# Patient Record
Sex: MALE | Race: WHITE | NOT HISPANIC OR LATINO | Employment: FULL TIME | URBAN - METROPOLITAN AREA
[De-identification: names, ages, dates, MRNs, and addresses within clinical notes are randomized per-mention and may not be internally consistent; named-entity substitution may affect disease eponyms.]

---

## 2017-01-25 RX ORDER — CANAGLIFLOZIN 300 MG/1
TABLET, FILM COATED ORAL
Qty: 30 TABLET | Refills: 0 | Status: SHIPPED | OUTPATIENT
Start: 2017-01-25 | End: 2017-02-21 | Stop reason: SDUPTHER

## 2017-02-21 RX ORDER — CANAGLIFLOZIN 300 MG/1
TABLET, FILM COATED ORAL
Qty: 30 TABLET | Refills: 0 | Status: SHIPPED | OUTPATIENT
Start: 2017-02-21 | End: 2017-03-28 | Stop reason: SDUPTHER

## 2017-02-28 ENCOUNTER — LAB (OUTPATIENT)
Dept: INTERNAL MEDICINE | Facility: CLINIC | Age: 57
End: 2017-02-28

## 2017-02-28 DIAGNOSIS — E78.5 DYSLIPIDEMIA: ICD-10-CM

## 2017-02-28 DIAGNOSIS — E55.9 VITAMIN D DEFICIENCY: Primary | ICD-10-CM

## 2017-02-28 DIAGNOSIS — IMO0002 UNCONTROLLED TYPE 2 DIABETES MELLITUS WITH DIABETIC NEPHROPATHY, WITHOUT LONG-TERM CURRENT USE OF INSULIN: ICD-10-CM

## 2017-02-28 DIAGNOSIS — E83.52 HYPERCALCEMIA: ICD-10-CM

## 2017-02-28 LAB
ANION GAP SERPL CALCULATED.3IONS-SCNC: 14 MMOL/L (ref 3–11)
ARTICHOKE IGE QN: 66 MG/DL (ref 0–130)
BUN BLD-MCNC: 19 MG/DL (ref 9–23)
BUN/CREAT SERPL: 15.8 (ref 7–25)
CA-I SERPL ISE-MCNC: 1.38 MMOL/L (ref 1.12–1.32)
CALCIUM SPEC-SCNC: 10.5 MG/DL (ref 8.7–10.4)
CHLORIDE SERPL-SCNC: 103 MMOL/L (ref 99–109)
CHOLEST SERPL-MCNC: 223 MG/DL (ref 0–200)
CO2 SERPL-SCNC: 21 MMOL/L (ref 20–31)
CREAT BLD-MCNC: 1.2 MG/DL (ref 0.6–1.3)
GFR SERPL CREATININE-BSD FRML MDRD: 63 ML/MIN/1.73
GLUCOSE BLD-MCNC: 124 MG/DL (ref 70–100)
HBA1C MFR BLD: 6.2 % (ref 4.8–5.6)
HDLC SERPL-MCNC: 29 MG/DL (ref 40–60)
POTASSIUM BLD-SCNC: 4.2 MMOL/L (ref 3.5–5.5)
SODIUM BLD-SCNC: 138 MMOL/L (ref 132–146)
TRIGL SERPL-MCNC: >1100 MG/DL (ref 0–150)

## 2017-02-28 PROCEDURE — 83970 ASSAY OF PARATHORMONE: CPT | Performed by: INTERNAL MEDICINE

## 2017-02-28 PROCEDURE — 36415 COLL VENOUS BLD VENIPUNCTURE: CPT | Performed by: INTERNAL MEDICINE

## 2017-02-28 PROCEDURE — 80048 BASIC METABOLIC PNL TOTAL CA: CPT | Performed by: INTERNAL MEDICINE

## 2017-02-28 PROCEDURE — 83036 HEMOGLOBIN GLYCOSYLATED A1C: CPT | Performed by: INTERNAL MEDICINE

## 2017-02-28 PROCEDURE — 82330 ASSAY OF CALCIUM: CPT | Performed by: INTERNAL MEDICINE

## 2017-02-28 PROCEDURE — 80061 LIPID PANEL: CPT | Performed by: INTERNAL MEDICINE

## 2017-02-28 PROCEDURE — 82306 VITAMIN D 25 HYDROXY: CPT | Performed by: INTERNAL MEDICINE

## 2017-03-01 LAB
25(OH)D3 SERPL-MCNC: 36.2 NG/ML
CALCIUM SERPL-MCNC: 10.4 MG/DL (ref 8.7–10.2)
INTACT PTH: ABNORMAL
PTH-INTACT SERPL-MCNC: 17 PG/ML (ref 15–65)

## 2017-03-03 ENCOUNTER — OFFICE VISIT (OUTPATIENT)
Dept: INTERNAL MEDICINE | Facility: CLINIC | Age: 57
End: 2017-03-03

## 2017-03-03 VITALS
DIASTOLIC BLOOD PRESSURE: 76 MMHG | WEIGHT: 199 LBS | BODY MASS INDEX: 29.47 KG/M2 | SYSTOLIC BLOOD PRESSURE: 120 MMHG | HEIGHT: 69 IN

## 2017-03-03 DIAGNOSIS — I10 ESSENTIAL HYPERTENSION: ICD-10-CM

## 2017-03-03 DIAGNOSIS — E83.52 HYPERCALCEMIA: ICD-10-CM

## 2017-03-03 DIAGNOSIS — E11.9 CONTROLLED TYPE 2 DIABETES MELLITUS WITHOUT COMPLICATION, WITHOUT LONG-TERM CURRENT USE OF INSULIN (HCC): ICD-10-CM

## 2017-03-03 DIAGNOSIS — Z00.00 PE (PHYSICAL EXAM), ROUTINE: Primary | ICD-10-CM

## 2017-03-03 DIAGNOSIS — E55.9 VITAMIN D DEFICIENCY: ICD-10-CM

## 2017-03-03 DIAGNOSIS — E78.5 DYSLIPIDEMIA: ICD-10-CM

## 2017-03-03 PROCEDURE — 93000 ELECTROCARDIOGRAM COMPLETE: CPT | Performed by: INTERNAL MEDICINE

## 2017-03-03 PROCEDURE — 99213 OFFICE O/P EST LOW 20 MIN: CPT | Performed by: INTERNAL MEDICINE

## 2017-03-03 PROCEDURE — 99396 PREV VISIT EST AGE 40-64: CPT | Performed by: INTERNAL MEDICINE

## 2017-03-03 NOTE — PROGRESS NOTES
Chief Complaint   Patient presents with   • Annual Exam       History of Present Illness  56 y.o.  ke presents for updated phys examination.  Not recently checking BGs since ran out of strips.  BG previously were fasting 120-180s.    Review of Systems  Denies headaches, visual changes, CP, palpitations, SOB, cough, abd pain, n/v/d, difficulty with urination, numbness/tingling, falls, mood changes, lightheadedness, hearing changes, rashes.    Reports compliance with medications.    ROS (+) for isolated swollen testicle for 1 day with spont resolved.  Sits a lot for his work.  Had to associated sxs, no urinary sxs, groin pain, rash.     All other ROS reviewed and negative.    [facility]  The following portions of the patient's history were reviewed and updated as appropriate: allergies, current medications, past family history, past medical history, past social history, past surgical history and problem list.      Current Outpatient Prescriptions:   •  aspirin 81 MG tablet, Take 1 tablet by mouth daily., Disp: , Rfl:   •  Cholecalciferol (VITAMIN D3) 5000 UNITS capsule capsule, Take 5,000 Units by mouth Daily., Disp: , Rfl:   •  EPINEPHrine (EPIPEN) 0.3 MG/0.3ML solution auto-injector injection, USE AS DIRECTED, Disp: 1 each, Rfl: 1  •  fenofibric acid (TRILIPIX) 135 MG capsule delayed-release delayed release capsule, Take 1 capsule by mouth Daily., Disp: 90 capsule, Rfl: 1  •  icosapent ethyl (VASCEPA) 1 G capsule capsule, Take 2 g by mouth 2 (Two) Times a Day With Meals., Disp: 360 capsule, Rfl: 3  •  INVOKANA 300 MG tablet, TAKE ONE TABLET BY MOUTH ONCE DAILY, Disp: 30 tablet, Rfl: 0  •  meclizine (ANTIVERT) 25 MG tablet, Take 1 tablet by mouth 3 (three) times a day as needed., Disp: , Rfl:   •  metFORMIN XR (GLUCOPHAGE-XR) 500 MG 24 hr tablet, Take 4 tablets by mouth Daily With Breakfast., Disp: 360 tablet, Rfl: 3  •  promethazine (PHENERGAN) 25 MG tablet, Take 1 tablet by mouth every 8 (eight) hours as  "needed. For nausea and vomiting, Disp: , Rfl:   •  triamterene-hydrochlorothiazide (DYAZIDE) 37.5-25 MG per capsule, Take 1 capsule by mouth Every Morning., Disp: 90 capsule, Rfl: 2    Visit Vitals   • /76   • Ht 69\" (175.3 cm)   • Wt 199 lb (90.3 kg)   • BMI 29.39 kg/m2       Physical Exam   Constitutional: He is oriented to person, place, and time. He appears well-developed and well-nourished.   HENT:   Head: Normocephalic.   Right Ear: Tympanic membrane normal.   Left Ear: Tympanic membrane normal.   Nose: Nose normal.   Mouth/Throat: Oropharynx is clear and moist and mucous membranes are normal. No oropharyngeal exudate.   Eyes: Conjunctivae and EOM are normal. Pupils are equal, round, and reactive to light.   Neck: Normal range of motion. Neck supple. Carotid bruit is not present. No thyromegaly present.   Cardiovascular: Normal rate, regular rhythm and normal heart sounds.    Pulmonary/Chest: Effort normal and breath sounds normal. No respiratory distress.   Abdominal: Soft. Bowel sounds are normal. He exhibits no distension and no mass. There is no hepatosplenomegaly. There is no tenderness.   Genitourinary:   Genitourinary Comments: Chaperone declined by patient; normal external genitalia, no hemorrhoids, prostate smooth, minimally enlarged, nontender, no nodules palpated       Musculoskeletal: Normal range of motion. He exhibits no edema.    Bradley had a diabetic foot exam performed today.    Vascular Status -  His exam exhibits no right foot edema. His exam exhibits no left foot edema.   Skin Integrity  -  His right foot skin is intact.     Bradley 's left foot skin is intact. .  Lymphadenopathy:     He has no cervical adenopathy.   Neurological: He is alert and oriented to person, place, and time. He has normal strength and normal reflexes. He displays normal reflexes. No cranial nerve deficit or sensory deficit.   Skin: Skin is warm and dry. No rash noted.   Psychiatric: He has a normal mood and " affect. His behavior is normal.   Nursing note and vitals reviewed.      LABS  Results for orders placed or performed in visit on 02/28/17   Lipid Panel   Result Value Ref Range    Total Cholesterol 223 (H) 0 - 200 mg/dL    Triglycerides >1100 (H) 0 - 150 mg/dL    HDL Cholesterol 29 (L) 40 - 60 mg/dL    LDL Cholesterol  66 0 - 130 mg/dL   Hemoglobin A1c   Result Value Ref Range    Hemoglobin A1C 6.20 (H) 4.80 - 5.60 %   Basic Metabolic Panel   Result Value Ref Range    Glucose 124 (H) 70 - 100 mg/dL    BUN 19 9 - 23 mg/dL    Creatinine 1.20 0.60 - 1.30 mg/dL    Sodium 138 132 - 146 mmol/L    Potassium 4.2 3.5 - 5.5 mmol/L    Chloride 103 99 - 109 mmol/L    CO2 21.0 20.0 - 31.0 mmol/L    Calcium 10.5 (H) 8.7 - 10.4 mg/dL    eGFR Non African Amer 63 >60 mL/min/1.73    BUN/Creatinine Ratio 15.8 7.0 - 25.0    Anion Gap 14.0 (H) 3.0 - 11.0 mmol/L   Calcium, Ionized   Result Value Ref Range    Ionized Calcium 1.38 (H) 1.12 - 1.32 mmol/L   PTH, Intact & Calcium   Result Value Ref Range    Calcium 10.4 (H) 8.7 - 10.2 mg/dL    PTH, Intact 17 15 - 65 pg/mL    PTH, Intact Comment    Vitamin D 25 Hydroxy   Result Value Ref Range    25 Hydroxy, Vitamin D 36.2 ng/ml     ECG 12 Lead  Date/Time: 3/3/2017 11:01 AM  Performed by: SPENCER TIDWELL  Authorized by: SPENCER TIDWELL   Comparison: compared with previous ECG from 3/1/2016  Similar to previous ECG  Rhythm: sinus rhythm  Rate: normal  BPM: 72  Conduction: conduction normal  ST Segments: ST segments normal  QRS axis: normal  Other findings: PRWP  Other findings comments: inverted T in lead III, unchanged  Clinical impression comment: stable EKG              ASSESSMENT/PLAN  Problem List Items Addressed This Visit     Dyslipidemia     TGs remain elevated with vascepa and fenofibrate; LDL at goal (66); rec trying very strict no fat/sugars diet as well as exercise to help with weight loss; f/u lipids in 3 mos         Hypertension     BP stable but with hypercalcemia, will discontinue  triam HCTZ; follow BPs and consider ACEI; f/u in 3 mos at the latest         Relevant Orders    ECG 12 Lead    Controlled type 2 diabetes mellitus      BG control significantly improved with A1C 6.2; remains on inokana 300mg QD and met XR 4 QD         Vitamin D deficiency     Stable, no current supplementation         PE (physical exam), routine - Primary     Health maintenance - flu vacc 10/16; PVX 9/16, plan for Prevnar at age 65; Tdap 7/11, Zostavax 11/13; CASSANDRA performed today with stable PSA 1.1 (12/16); nl colonosc 6/15, repeat 2020 per Dr. Brandon; eye exam 10/16 at [facility], done annually; dental exam UTD, done every 6 mos         Hypercalcemia     Bord Ca levels 10.4-10.7 over the last year; nl PTH; bord ionized Ca; discussed with Dr Linder for recs; d/c HCTZ and f/u level (BMP, ionized Ca, PTH-related peptide, SPEP, UPEP) in 1 month               FOLLOW-UP  1/ F/U labs for hypercalcemia evaluation in 1 month (BMP, ionized Ca, PTH, SPEP, UPEP, PTH-related peptide)  2. RTC 3 mos with A1C, lipids     Electronically signed by:    Violette Hylton MD  03/03/2017

## 2017-03-28 RX ORDER — CANAGLIFLOZIN 300 MG/1
TABLET, FILM COATED ORAL
Qty: 30 TABLET | Refills: 3 | Status: SHIPPED | OUTPATIENT
Start: 2017-03-28 | End: 2017-09-02 | Stop reason: SDUPTHER

## 2017-04-24 RX ORDER — FENOFIBRIC ACID 135 MG/1
CAPSULE, DELAYED RELEASE ORAL
Qty: 90 CAPSULE | Refills: 1 | Status: SHIPPED | OUTPATIENT
Start: 2017-04-24 | End: 2017-10-09 | Stop reason: SDUPTHER

## 2017-04-25 ENCOUNTER — TELEPHONE (OUTPATIENT)
Dept: INTERNAL MEDICINE | Facility: CLINIC | Age: 57
End: 2017-04-25

## 2017-04-25 NOTE — TELEPHONE ENCOUNTER
Patient notified.     He does not need a referral. He has scheduled an appt with Dr Bynum on May 2nd.

## 2017-04-25 NOTE — TELEPHONE ENCOUNTER
----- Message from Violette Hylton MD sent at 4/24/2017 10:32 AM EDT -----  Contact: PATIENT   rec urology consultation - does he want us to send referral    Also reminder that he needs to have labs done to follow-up on borderline calcium levels (nonfasting ok)  ----- Message -----     From: Yohana Lucas MA     Sent: 4/24/2017  10:20 AM       To: Violette Hylton MD        ----- Message -----     From: Kiana Knutson     Sent: 4/24/2017  10:15 AM       To: Yohana Lucas MA    RE: PAIN QUESTIONS    MR BRUNO WOULD LIKE TO KNOW IF HE NEEDS TO GO TO SPECIALIST OR COME IN TO SEE DR HYLTON FOR TESTICLE PAIN. HE SPOKE WITH WITH DR HYLTON ABOUT THIS DURING HIS LAST VISIT BUT THE ISSUE HAS NOT RESOLEVED ITSELF AND HE WOULD LIKE FURTHER EVALUATION.    CALL BACK #[phone]

## 2017-05-02 ENCOUNTER — LAB (OUTPATIENT)
Dept: INTERNAL MEDICINE | Facility: CLINIC | Age: 57
End: 2017-05-02

## 2017-05-02 DIAGNOSIS — E83.52 HYPERCALCEMIA: ICD-10-CM

## 2017-05-02 LAB — CA-I SERPL ISE-MCNC: 1.37 MMOL/L (ref 1.12–1.32)

## 2017-05-02 PROCEDURE — 82397 CHEMILUMINESCENT ASSAY: CPT | Performed by: INTERNAL MEDICINE

## 2017-05-02 PROCEDURE — 84155 ASSAY OF PROTEIN SERUM: CPT | Performed by: INTERNAL MEDICINE

## 2017-05-02 PROCEDURE — 84165 PROTEIN E-PHORESIS SERUM: CPT | Performed by: INTERNAL MEDICINE

## 2017-05-02 PROCEDURE — 82310 ASSAY OF CALCIUM: CPT | Performed by: INTERNAL MEDICINE

## 2017-05-02 PROCEDURE — 83970 ASSAY OF PARATHORMONE: CPT | Performed by: INTERNAL MEDICINE

## 2017-05-02 PROCEDURE — 82330 ASSAY OF CALCIUM: CPT | Performed by: INTERNAL MEDICINE

## 2017-05-03 LAB
ALBUMIN SERPL-MCNC: 3.7 G/DL (ref 2.9–4.4)
ALBUMIN/GLOB SERPL: 1.2 {RATIO} (ref 0.7–1.7)
ALPHA1 GLOB FLD ELPH-MCNC: 0.2 G/DL (ref 0–0.4)
ALPHA2 GLOB SERPL ELPH-MCNC: 0.8 G/DL (ref 0.4–1)
B-GLOBULIN SERPL ELPH-MCNC: 1 G/DL (ref 0.7–1.3)
CALCIUM SERPL-MCNC: 9.8 MG/DL (ref 8.7–10.2)
GAMMA GLOB SERPL ELPH-MCNC: 1.2 G/DL (ref 0.4–1.8)
GLOBULIN SER CALC-MCNC: 3.2 G/DL (ref 2.2–3.9)
INTACT PTH: NORMAL
Lab: NORMAL
M-SPIKE: NORMAL G/DL
PROT PATTERN SERPL ELPH-IMP: NORMAL
PROT SERPL-MCNC: 6.9 G/DL (ref 6–8.5)
PTH-INTACT SERPL-MCNC: 35 PG/ML (ref 15–65)

## 2017-05-06 LAB — PTH RELATED PROT SERPL-SCNC: <1.1 PMOL/L

## 2017-06-02 ENCOUNTER — TELEPHONE (OUTPATIENT)
Dept: INTERNAL MEDICINE | Facility: CLINIC | Age: 57
End: 2017-06-02

## 2017-06-02 ENCOUNTER — LAB (OUTPATIENT)
Dept: INTERNAL MEDICINE | Facility: CLINIC | Age: 57
End: 2017-06-02

## 2017-06-02 DIAGNOSIS — E11.9 CONTROLLED TYPE 2 DIABETES MELLITUS WITHOUT COMPLICATION, WITHOUT LONG-TERM CURRENT USE OF INSULIN (HCC): Primary | ICD-10-CM

## 2017-06-02 PROCEDURE — 83036 HEMOGLOBIN GLYCOSYLATED A1C: CPT | Performed by: INTERNAL MEDICINE

## 2017-06-02 NOTE — TELEPHONE ENCOUNTER
"----- Message from Violette Hylton MD sent at 6/2/2017  3:22 PM EDT -----  Contact: Patient   No - don't need to do urine test.    Please clarify however:  At last visit, was instructed to stop triam HCTZ and then to recheck calcium.  Is he still taking or not taking this?  If no longer on it, please take it off med list andd add to allergy list with \"side effect\" of hypercalcemia.  Would repeat BMP in 1 month to ensure cont'd resolution (nonfasting ok)  ----- Message -----     From: Yohana Lucas MA     Sent: 6/2/2017   9:22 AM       To: Violette Hylton MD    Does he need to do 24 hour urine test?     "

## 2017-06-08 ENCOUNTER — OFFICE VISIT (OUTPATIENT)
Dept: INTERNAL MEDICINE | Facility: CLINIC | Age: 57
End: 2017-06-08

## 2017-06-08 VITALS
BODY MASS INDEX: 29.77 KG/M2 | HEIGHT: 69 IN | DIASTOLIC BLOOD PRESSURE: 78 MMHG | WEIGHT: 201 LBS | SYSTOLIC BLOOD PRESSURE: 124 MMHG

## 2017-06-08 DIAGNOSIS — I10 ESSENTIAL HYPERTENSION: ICD-10-CM

## 2017-06-08 DIAGNOSIS — E83.52 HYPERCALCEMIA: ICD-10-CM

## 2017-06-08 DIAGNOSIS — E78.5 DYSLIPIDEMIA: ICD-10-CM

## 2017-06-08 DIAGNOSIS — E11.9 CONTROLLED TYPE 2 DIABETES MELLITUS WITHOUT COMPLICATION, WITHOUT LONG-TERM CURRENT USE OF INSULIN (HCC): Primary | ICD-10-CM

## 2017-06-08 LAB — HBA1C MFR BLD: 6.4 %

## 2017-06-08 PROCEDURE — 99214 OFFICE O/P EST MOD 30 MIN: CPT | Performed by: INTERNAL MEDICINE

## 2017-06-08 NOTE — PROGRESS NOTES
"Chief Complaint   Patient presents with   • Follow-up     Diabetes        History of Present Illness  56 y.o.  gentleman presents for DM follow-up.  Cholesterol profile was not done.  Not checking BGs.  Notes diet is average.  Reports compliance with fenofibrate and vascepa.    Review of Systems  ROS (+) for (+)FH TGs in father, who  of CA.  Denies CP, palpitations, SOB.  Notes ears have been asx despite discontinuation of HCTZ.  All other ROS reviewed and negative.    [facility]  The following portions of the patient's history were reviewed and updated as appropriate: allergies, current medications, past family history, past medical history, past social history, past surgical history and problem list.      Current Outpatient Prescriptions:   •  aspirin 81 MG tablet, Take 1 tablet by mouth daily., Disp: , Rfl:   •  Cholecalciferol (VITAMIN D3) 5000 UNITS capsule capsule, Take 5,000 Units by mouth Daily., Disp: , Rfl:   •  EPINEPHrine (EPIPEN) 0.3 MG/0.3ML solution auto-injector injection, USE AS DIRECTED, Disp: 1 each, Rfl: 1  •  fenofibric acid (TRILIPIX) 135 MG capsule delayed-release delayed release capsule, TAKE ONE CAPSULE BY MOUTH ONCE DAILY, Disp: 90 capsule, Rfl: 1  •  icosapent ethyl (VASCEPA) 1 G capsule capsule, Take 2 g by mouth 2 (Two) Times a Day With Meals., Disp: 360 capsule, Rfl: 3  •  INVOKANA 300 MG tablet, TAKE ONE TABLET BY MOUTH ONCE DAILY, Disp: 30 tablet, Rfl: 3  •  meclizine (ANTIVERT) 25 MG tablet, Take 1 tablet by mouth 3 (three) times a day as needed., Disp: , Rfl:   •  metFORMIN XR (GLUCOPHAGE-XR) 500 MG 24 hr tablet, Take 4 tablets by mouth Daily With Breakfast., Disp: 360 tablet, Rfl: 3  •  promethazine (PHENERGAN) 25 MG tablet, Take 1 tablet by mouth every 8 (eight) hours as needed. For nausea and vomiting, Disp: , Rfl:     VITALS:  /78  Ht 69\" (175.3 cm)  Wt 201 lb (91.2 kg)  BMI 29.68 kg/m2    Physical Exam   Constitutional: He is oriented to person, place, and " time. He appears well-developed and well-nourished.   Eyes: Conjunctivae and EOM are normal.   Cardiovascular: Normal rate, regular rhythm and normal heart sounds.    Pulmonary/Chest: Effort normal and breath sounds normal.   Abdominal: Soft. Bowel sounds are normal.   Neurological: He is alert and oriented to person, place, and time.   Psychiatric: He has a normal mood and affect. His behavior is normal.   Nursing note and vitals reviewed.      LABS  Results for orders placed or performed in visit on 06/02/17   POC Glycosylated Hemoglobin (Hb A1C)   Result Value Ref Range    Hemoglobin A1C 6.4 %   2/17 A1C 6.2    Results for orders placed or performed in visit on 05/02/17   PTH-related Peptide   Result Value Ref Range    PTH-Related Protein <1.1 pmol/L   Calcium, Ionized   Result Value Ref Range    Ionized Calcium 1.37 (H) 1.12 - 1.32 mmol/L   PTH, Intact & Calcium   Result Value Ref Range    Calcium 9.8 8.7 - 10.2 mg/dL    PTH, Intact 35 15 - 65 pg/mL    PTH, Intact Comment    Protein Elec + Interp, Serum   Result Value Ref Range    Total Protein 6.9 6.0 - 8.5 g/dL    Albumin 3.7 2.9 - 4.4 g/dL    Alpha-1-Globulin 0.2 0.0 - 0.4 g/dL    Alpha-2-Globulin 0.8 0.4 - 1.0 g/dL    Beta Globulin 1.0 0.7 - 1.3 g/dL    Gamma Globulin 1.2 0.4 - 1.8 g/dL    M-Rashad Not Observed Not Observed g/dL    Globulin 3.2 2.2 - 3.9 g/dL    A/G Ratio 1.2 0.7 - 1.7    Please note Comment     SPE Interpretation Comment        ASSESSMENT/PLAN  Problem List Items Addressed This Visit     Dyslipidemia     On fenofibrate and vascepa; (+)FH TGs in father; encouraged further decreasing saturated fats in the diet; he will return tomorrow for fasting lipids         Hypertension     BP remains stable; no current meds (previously on HCTZ for Meniere's)         Controlled type 2 diabetes mellitus  - Primary     BG control stable with A1C 6.4; on met XR 2000mg QD and invokana 300mg QD; question whether invokana is exacerbating TGs; repeat A1C in 3  mos         Hypercalcemia     Seemingly resolved after discontinuation of HCTZ; repeat again with next labs in 3 mos to ensure stability               FOLLOW-UP  1. Return tomorrow for updated lipids (escribed)  2. RTC 3 mos with lipids, BMP, A1C (escribed)    Electronically signed by:    Violette Hylton MD  06/08/2017

## 2017-06-08 NOTE — ASSESSMENT & PLAN NOTE
Seemingly resolved after discontinuation of HCTZ; repeat again with next labs in 3 mos to ensure stability

## 2017-06-08 NOTE — ASSESSMENT & PLAN NOTE
On fenofibrate and vascepa; (+)FH TGs in father; encouraged further decreasing saturated fats in the diet; he will return tomorrow for fasting lipids

## 2017-06-08 NOTE — ASSESSMENT & PLAN NOTE
BG control stable with A1C 6.4; on met XR 2000mg QD and invokana 300mg QD; question whether invokana is exacerbating TGs; repeat A1C in 3 mos

## 2017-07-03 ENCOUNTER — LAB (OUTPATIENT)
Dept: INTERNAL MEDICINE | Facility: CLINIC | Age: 57
End: 2017-07-03

## 2017-07-03 DIAGNOSIS — E78.5 DYSLIPIDEMIA: ICD-10-CM

## 2017-07-03 LAB
ARTICHOKE IGE QN: 115 MG/DL (ref 0–130)
CHOLEST SERPL-MCNC: 187 MG/DL (ref 0–200)
HDLC SERPL-MCNC: 33 MG/DL (ref 40–60)
TRIGL SERPL-MCNC: 326 MG/DL (ref 0–150)

## 2017-07-03 PROCEDURE — 80061 LIPID PANEL: CPT | Performed by: INTERNAL MEDICINE

## 2017-07-03 NOTE — PROGRESS NOTES
Dear  Angel,    Thank you for obtaining the laboratories that we ordered.  I have received those results and would like to review them with you.    I am so excited to share your cholesterol profile results with you, showing a total cholesterol of 187 (goal < 200).  Your triglycerides are much improved at 326 (previously >1100).  Your HDL, the good cholesterol, is unchanged/borderline low at 33.  HDL is heart protective, and the goal is > 40 in men.  The best way to increased HDL is regular aerobic exercise.  Your LDL, the bad cholesterol, is slightly increased at 115 (previously 66).  Goal for LDL is < 100.    Let me know if you have any questions or concerns regarding these results; otherwise I will see you back for follow-up as scheduled on 9/15/17 (and please remember to do fasting labs the week prior to that appointment.    Sincerely,  Violette Hylton MD

## 2017-09-02 RX ORDER — CANAGLIFLOZIN 300 MG/1
TABLET, FILM COATED ORAL
Qty: 30 TABLET | Refills: 3 | Status: SHIPPED | OUTPATIENT
Start: 2017-09-02 | End: 2018-01-08 | Stop reason: SDUPTHER

## 2017-09-08 ENCOUNTER — LAB (OUTPATIENT)
Dept: INTERNAL MEDICINE | Facility: CLINIC | Age: 57
End: 2017-09-08

## 2017-09-08 DIAGNOSIS — E83.52 HYPERCALCEMIA: ICD-10-CM

## 2017-09-08 DIAGNOSIS — E11.9 CONTROLLED TYPE 2 DIABETES MELLITUS WITHOUT COMPLICATION, WITHOUT LONG-TERM CURRENT USE OF INSULIN (HCC): ICD-10-CM

## 2017-09-08 DIAGNOSIS — E78.5 DYSLIPIDEMIA: ICD-10-CM

## 2017-09-08 LAB
ANION GAP SERPL CALCULATED.3IONS-SCNC: 10 MMOL/L (ref 3–11)
ARTICHOKE IGE QN: 116 MG/DL (ref 0–130)
BUN BLD-MCNC: 16 MG/DL (ref 9–23)
BUN/CREAT SERPL: 14.5 (ref 7–25)
CALCIUM SPEC-SCNC: 10.1 MG/DL (ref 8.7–10.4)
CHLORIDE SERPL-SCNC: 104 MMOL/L (ref 99–109)
CHOLEST SERPL-MCNC: 210 MG/DL (ref 0–200)
CO2 SERPL-SCNC: 24 MMOL/L (ref 20–31)
CREAT BLD-MCNC: 1.1 MG/DL (ref 0.6–1.3)
GFR SERPL CREATININE-BSD FRML MDRD: 69 ML/MIN/1.73
GLUCOSE BLD-MCNC: 128 MG/DL (ref 70–100)
HBA1C MFR BLD: 5.9 % (ref 4.8–5.6)
HDLC SERPL-MCNC: 33 MG/DL (ref 40–60)
POTASSIUM BLD-SCNC: 4.4 MMOL/L (ref 3.5–5.5)
SODIUM BLD-SCNC: 138 MMOL/L (ref 132–146)
TRIGL SERPL-MCNC: 306 MG/DL (ref 0–150)

## 2017-09-08 PROCEDURE — 80048 BASIC METABOLIC PNL TOTAL CA: CPT | Performed by: INTERNAL MEDICINE

## 2017-09-08 PROCEDURE — 80061 LIPID PANEL: CPT | Performed by: INTERNAL MEDICINE

## 2017-09-08 PROCEDURE — 83036 HEMOGLOBIN GLYCOSYLATED A1C: CPT | Performed by: INTERNAL MEDICINE

## 2017-09-15 ENCOUNTER — OFFICE VISIT (OUTPATIENT)
Dept: INTERNAL MEDICINE | Facility: CLINIC | Age: 57
End: 2017-09-15

## 2017-09-15 VITALS
DIASTOLIC BLOOD PRESSURE: 80 MMHG | SYSTOLIC BLOOD PRESSURE: 126 MMHG | WEIGHT: 200.62 LBS | BODY MASS INDEX: 29.63 KG/M2

## 2017-09-15 DIAGNOSIS — I10 ESSENTIAL HYPERTENSION: ICD-10-CM

## 2017-09-15 DIAGNOSIS — E11.9 CONTROLLED TYPE 2 DIABETES MELLITUS WITHOUT COMPLICATION, WITHOUT LONG-TERM CURRENT USE OF INSULIN (HCC): Primary | ICD-10-CM

## 2017-09-15 DIAGNOSIS — E78.5 DYSLIPIDEMIA: ICD-10-CM

## 2017-09-15 DIAGNOSIS — G56.03 CARPAL TUNNEL SYNDROME, BILATERAL: ICD-10-CM

## 2017-09-15 PROCEDURE — 99214 OFFICE O/P EST MOD 30 MIN: CPT | Performed by: INTERNAL MEDICINE

## 2017-09-15 NOTE — ASSESSMENT & PLAN NOTE
BG control improved with A1C 5.9 (was 6.4 in 6/17); cont invokana 300mg QD and met XR 2000mg QD; encouraged reg phys activity (rec scheduling exercise plan) to decr insulin resistance, moderation in unhealthy starches/sweets; f/u A1C in 3 mos

## 2017-09-15 NOTE — ASSESSMENT & PLAN NOTE
Upcoming consultation with Dr. Bertrand; discussed importance of looking at ergonomics at work; request ortho note

## 2017-09-15 NOTE — ASSESSMENT & PLAN NOTE
Stable lipids with bord  (goal < 100) and still elevated but stable TGs 306; HDL low at 33; remain on fenofibrate 135mg QD and vascepa 2 BID

## 2017-09-15 NOTE — PROGRESS NOTES
Chief Complaint   Patient presents with   • Follow-up     Hyperlipidemia, diabetes, hypercalcemia        History of Present Illness  56 y.o.  gentleman presents for sugar and cholesterol follow-up (1hr late for appt).  Has not been checking BGs.  Reports compliance with meds.    No current exercise plan since working 4 10-hr shifts.    Reports hand ortho consultation upcoming for CTS R>L; patient is right-handed and had nerve study.  Previously having pins and needles in finger (R. Index and third finger); reports having trouble fully spreading out fingers and even holding cup over the summer. Sxs were awakening him from sleep. Has worked on ergonomics at work and now improved; hoping to just get injection from Dr. Bertrand.    Review of Systems  ROS (+) for pins  All other ROS reviewed and negative.    [facility]  The following portions of the patient's history were reviewed and updated as appropriate: allergies, current medications, past family history, past medical history, past social history, past surgical history and problem list.      Current Outpatient Prescriptions:   •  aspirin 81 MG tablet, Take 1 tablet by mouth daily., Disp: , Rfl:   •  Cholecalciferol (VITAMIN D3) 5000 UNITS capsule capsule, Take 5,000 Units by mouth Daily., Disp: , Rfl:   •  EPINEPHrine (EPIPEN) 0.3 MG/0.3ML solution auto-injector injection, USE AS DIRECTED, Disp: 1 each, Rfl: 1  •  fenofibric acid (TRILIPIX) 135 MG capsule delayed-release delayed release capsule, TAKE ONE CAPSULE BY MOUTH ONCE DAILY, Disp: 90 capsule, Rfl: 1  •  icosapent ethyl (VASCEPA) 1 G capsule capsule, Take 2 g by mouth 2 (Two) Times a Day With Meals., Disp: 360 capsule, Rfl: 3  •  INVOKANA 300 MG tablet, TAKE ONE TABLET BY MOUTH ONCE DAILY, Disp: 30 tablet, Rfl: 3  •  meclizine (ANTIVERT) 25 MG tablet, Take 1 tablet by mouth 3 (three) times a day as needed., Disp: , Rfl:   •  metFORMIN XR (GLUCOPHAGE-XR) 500 MG 24 hr tablet, Take 4 tablets by mouth Daily  With Breakfast., Disp: 360 tablet, Rfl: 3  •  promethazine (PHENERGAN) 25 MG tablet, Take 1 tablet by mouth every 8 (eight) hours as needed. For nausea and vomiting, Disp: , Rfl:     VITALS:  /80  Wt 200 lb 9.9 oz (91 kg)  BMI 29.63 kg/m2    Physical Exam   Constitutional: He is oriented to person, place, and time. He appears well-developed and well-nourished.   Eyes: Conjunctivae and EOM are normal.   Cardiovascular: Normal rate, regular rhythm and normal heart sounds.    Pulmonary/Chest: Effort normal and breath sounds normal.   Musculoskeletal:   bilat wrists FROM; sensation intact BUE   Neurological: He is alert and oriented to person, place, and time.   Psychiatric: He has a normal mood and affect. His behavior is normal.   Nursing note and vitals reviewed.      LABS  Results for orders placed or performed in visit on 09/08/17   Lipid Panel   Result Value Ref Range    Total Cholesterol 210 (H) 0 - 200 mg/dL    Triglycerides 306 (H) 0 - 150 mg/dL    HDL Cholesterol 33 (L) 40 - 60 mg/dL    LDL Cholesterol  116 0 - 130 mg/dL   Hemoglobin A1c   Result Value Ref Range    Hemoglobin A1C 5.90 (H) 4.80 - 5.60 %   Basic Metabolic Panel   Result Value Ref Range    Glucose 128 (H) 70 - 100 mg/dL    BUN 16 9 - 23 mg/dL    Creatinine 1.10 0.60 - 1.30 mg/dL    Sodium 138 132 - 146 mmol/L    Potassium 4.4 3.5 - 5.5 mmol/L    Chloride 104 99 - 109 mmol/L    CO2 24.0 20.0 - 31.0 mmol/L    Calcium 10.1 8.7 - 10.4 mg/dL    eGFR Non African Amer 69 >60 mL/min/1.73    BUN/Creatinine Ratio 14.5 7.0 - 25.0    Anion Gap 10.0 3.0 - 11.0 mmol/L   6/17 A1C 6.4    ASSESSMENT/PLAN  Problem List Items Addressed This Visit     Dyslipidemia     Stable lipids with bord  (goal < 100) and still elevated but stable TGs 306; HDL low at 33; remain on fenofibrate 135mg QD and vascepa 2 BID         Hypertension     BP stable on no meds         Controlled type 2 diabetes mellitus  - Primary     BG control improved with A1C 5.9 (was 6.4  in 6/17); cont invokana 300mg QD and met XR 2000mg QD; encouraged reg phys activity (rec scheduling exercise plan) to decr insulin resistance, moderation in unhealthy starches/sweets; f/u A1C in 3 mos           Carpal tunnel syndrome, bilateral     Upcoming consultation with Dr. Bertrand; discussed importance of looking at ergonomics at work; request ortho note               FOLLOW-UP  1. Health maintenance - will get flu vaccine at work  2. RTC 3 mos with A1C    Electronically signed by:    Violette Hylton MD  09/15/2017

## 2017-10-09 RX ORDER — FENOFIBRIC ACID 135 MG/1
CAPSULE, DELAYED RELEASE ORAL
Qty: 90 CAPSULE | Refills: 1 | Status: SHIPPED | OUTPATIENT
Start: 2017-10-09 | End: 2018-04-17 | Stop reason: SDUPTHER

## 2017-10-18 ENCOUNTER — HOSPITAL ENCOUNTER (OUTPATIENT)
Dept: GENERAL RADIOLOGY | Facility: HOSPITAL | Age: 57
Discharge: HOME | End: 2017-10-18
Admitting: NURSE PRACTITIONER

## 2017-10-18 ENCOUNTER — OFFICE VISIT (OUTPATIENT)
Dept: INTERNAL MEDICINE | Facility: CLINIC | Age: 57
End: 2017-10-18

## 2017-10-18 VITALS
OXYGEN SATURATION: 99 % | HEART RATE: 73 BPM | WEIGHT: 201.2 LBS | DIASTOLIC BLOOD PRESSURE: 86 MMHG | SYSTOLIC BLOOD PRESSURE: 140 MMHG | HEIGHT: 69 IN | BODY MASS INDEX: 29.8 KG/M2

## 2017-10-18 DIAGNOSIS — R04.2 HEMOPTYSIS: ICD-10-CM

## 2017-10-18 DIAGNOSIS — R05.9 COUGH: Primary | ICD-10-CM

## 2017-10-18 PROCEDURE — 99213 OFFICE O/P EST LOW 20 MIN: CPT | Performed by: NURSE PRACTITIONER

## 2017-10-18 PROCEDURE — 71020 HC CHEST PA AND LATERAL: CPT

## 2017-10-18 RX ORDER — INFLUENZA A VIRUS A/MICHIGAN/45/2015 X-275 (H1N1) ANTIGEN (FORMALDEHYDE INACTIVATED), INFLUENZA A VIRUS A/SINGAPORE/INFIMH-16-0019/2016 IVR-186 (H3N2) ANTIGEN (FORMALDEHYDE INACTIVATED), INFLUENZA B VIRUS B/PHUKET/3073/2013 ANTIGEN (FORMALDEHYDE INACTIVATED), AND INFLUENZA B VIRUS B/MARYLAND/15/2016 BX-69A ANTIGEN (FORMALDEHYDE INACTIVATED) 15; 15; 15; 15 UG/.5ML; UG/.5ML; UG/.5ML; UG/.5ML
INJECTION, SUSPENSION INTRAMUSCULAR
COMMUNITY
Start: 2017-10-05 | End: 2018-04-16

## 2017-10-18 NOTE — PROGRESS NOTES
"Subjective  Cough (x 1 month)      Bradley Ortiz is a 57 y.o. male.   Allergies   Allergen Reactions   • Triamterene Other (See Comments)     Hypercalcemia     History of Present Illness      Cough intermittent x 1 month , sometimes productive , yesterday did cough up blood , wife says she can hear his chest \"gurgling\" when he sleeps, denies fever/chills no blood in urine/stool noted   Pt denies heartburn  The following portions of the patient's history were reviewed and updated as appropriate: allergies, past social history and problem list.    Review of Systems   Respiratory: Positive for cough.         Coughed up blood x 1   All other systems reviewed and are negative.      Objective   Physical Exam   Constitutional: He is oriented to person, place, and time. He appears well-developed and well-nourished.   HENT:   Head: Normocephalic and atraumatic.   Mouth/Throat: Oropharynx is clear and moist.   Eyes: Conjunctivae are normal.   Cardiovascular: Normal rate and regular rhythm.    Pulmonary/Chest: Effort normal and breath sounds normal.   Abdominal: Soft. Bowel sounds are normal. He exhibits no distension. There is no tenderness.   Neurological: He is oriented to person, place, and time.   Psychiatric: He has a normal mood and affect. His behavior is normal. Judgment and thought content normal.   Nursing note and vitals reviewed.    /86  Pulse 73  Ht 69\" (175.3 cm)  Wt 201 lb 3.2 oz (91.3 kg)  SpO2 99%  BMI 29.71 kg/m2    Assessment/Plan     Problem List Items Addressed This Visit     None      Visit Diagnoses     Cough    -  Primary    Relevant Orders    XR Chest PA & Lateral    CBC & Differential    Ambulatory Referral to Pulmonology    Hemoptysis        Relevant Orders    CBC & Differential    Ambulatory Referral to Pulmonology        Pt to go get cxr and cbc , I will also refer him to pulmonary if cxr not indicative of cause. Suggested prilosec otc po qd to see if this helps . I will send a copy of " this note to pcp Dr Hylton

## 2017-10-20 ENCOUNTER — TELEPHONE (OUTPATIENT)
Dept: INTERNAL MEDICINE | Facility: CLINIC | Age: 57
End: 2017-10-20

## 2017-10-20 NOTE — TELEPHONE ENCOUNTER
Spoke to patient.     Adina recommended he see pulm. His cough has improved, he wants to know if you think pulm consult is necessary?

## 2017-10-21 NOTE — TELEPHONE ENCOUNTER
CXR 10/18/17 was unremarkable; would keep pulm appt if still coughing up blood; ok to cancel if cough is basically resolved and no further coughing up blood and breathing back to baseline

## 2018-01-05 ENCOUNTER — OFFICE VISIT (OUTPATIENT)
Dept: INTERNAL MEDICINE | Facility: CLINIC | Age: 58
End: 2018-01-05

## 2018-01-05 VITALS
SYSTOLIC BLOOD PRESSURE: 136 MMHG | DIASTOLIC BLOOD PRESSURE: 78 MMHG | HEART RATE: 73 BPM | BODY MASS INDEX: 30.42 KG/M2 | OXYGEN SATURATION: 98 % | WEIGHT: 206 LBS

## 2018-01-05 DIAGNOSIS — I10 ESSENTIAL HYPERTENSION: ICD-10-CM

## 2018-01-05 DIAGNOSIS — M79.671 RIGHT FOOT PAIN: ICD-10-CM

## 2018-01-05 DIAGNOSIS — E11.9 CONTROLLED TYPE 2 DIABETES MELLITUS WITHOUT COMPLICATION, WITHOUT LONG-TERM CURRENT USE OF INSULIN (HCC): Primary | ICD-10-CM

## 2018-01-05 LAB — HBA1C MFR BLD: 6.3 %

## 2018-01-05 PROCEDURE — 83036 HEMOGLOBIN GLYCOSYLATED A1C: CPT | Performed by: INTERNAL MEDICINE

## 2018-01-05 PROCEDURE — 99214 OFFICE O/P EST MOD 30 MIN: CPT | Performed by: INTERNAL MEDICINE

## 2018-01-05 NOTE — ASSESSMENT & PLAN NOTE
BG bord with A1C 6.3; remains on met XR 2000mg QD and invokana 300mg QD: encouraged reg phys activity to decr insulin resistance, moderation in unhealthy starches/sweets; f/u A1C in 3 mos

## 2018-01-05 NOTE — ASSESSMENT & PLAN NOTE
Repeat BP improved; note goal < 130/85; rec low Na diet, increased aerobic exercise; home BP monitoring with goal BP < 130/80; no current meds

## 2018-01-05 NOTE — PROGRESS NOTES
Chief Complaint   Patient presents with   • Follow-up     Type 2 diabetes       History of Present Illness  57 y.o.  gentleman presents for DM follow-up.  Is not checking BGs or BPs at home.  Denies headaches, CP, palpitations, SOB.  Notes furnace went out this morning.  Reports R. CTS went well in 10/17.    Complains of right outer foot pain that started a few weeks ago.  Hurts with walking; no injuries.  Not sure what else makes it better or worse.  Denies skin changes and notes pain not in foot joint.    Review of Systems  ROS (+) for outer right foot pain.  Notes updated eye exam 12/17 at [facility].  Denies numbness/tingling, CP, palpitations, SOB, lightheadedness.  All other ROS reviewed and negative.    [facility]  The following portions of the patient's history were reviewed and updated as appropriate: allergies, current medications, past family history, past medical history, past social history, past surgical history and problem list.      Current Outpatient Prescriptions:   •  aspirin 81 MG tablet, QD  •  Cholecalciferol (VITAMIN D3) 5000 UNITS capsule QD  •  EPINEPHrine (EPIPEN) 0.3 MG/0.3ML solution auto-injector injection, AD  •  fenofibric acid (TRILIPIX) 135 MG capsule QD  •  icosapent ethyl (VASCEPA) 1 G capsule capsule, 2 BID  •  INVOKANA 300 MG tablet, QD  •  meclizine (ANTIVERT) 25 MG tablet, TID prn  •  metFORMIN XR (GLUCOPHAGE-XR) 500 MG 24 hr tablet, 4 QD    VITALS:  /78 (BP Location: Right arm, Patient Position: Sitting)  Pulse 73  Wt 93.4 kg (206 lb)  SpO2 98%  BMI 30.42 kg/m2    Physical Exam   Constitutional: He is oriented to person, place, and time. He appears well-developed and well-nourished.   Eyes: Conjunctivae and EOM are normal.   Cardiovascular: Normal rate, regular rhythm and normal heart sounds.    Pulmonary/Chest: Effort normal and breath sounds normal.   Abdominal: Soft. Bowel sounds are normal.   Musculoskeletal: Normal range of motion. He exhibits no edema,  tenderness (lateral right foot nontender, no swelling) or deformity (right foot).   Neurological: He is alert and oriented to person, place, and time.   Psychiatric: He has a normal mood and affect. His behavior is normal.   Nursing note and vitals reviewed.      LABS  Results for orders placed or performed in visit on 01/05/18   POC Glycosylated Hemoglobin (Hb A1C)   Result Value Ref Range    Hemoglobin A1C 6.3 %   was 5.9 in 9/17    ASSESSMENT/PLAN  Problem List Items Addressed This Visit     Hypertension     Repeat BP improved; note goal < 130/85; rec low Na diet, increased aerobic exercise; home BP monitoring with goal BP < 130/80; no current meds           Controlled type 2 diabetes mellitus  - Primary     BG bord with A1C 6.3; remains on met XR 2000mg QD and invokana 300mg QD: encouraged reg phys activity to decr insulin resistance, moderation in unhealthy starches/sweets; f/u A1C in 3 mos           Relevant Orders    POC Glycosylated Hemoglobin (Hb A1C) (Completed)      Other Visit Diagnoses     Right foot pain        pay attn to triggers or relieving factors; consider insole; f/u in 3/18 at PE if no better          FOLLOW-UP  1. Health maintenance - flu vacc 10/17; eye exam at [facility] 12/17 - patient will request records again  2. RTC 3/9/18 for PE as scheduled; fasting labs wk prior to appt (CBC, CMP, TSH, lipids, UA/micr, microalb, A1C, PSA, CPK, vit D, B12)    Electronically signed by:    Violette Hylton MD  01/05/2018

## 2018-01-08 DIAGNOSIS — E78.5 DYSLIPIDEMIA: ICD-10-CM

## 2018-01-08 RX ORDER — CANAGLIFLOZIN 300 MG/1
TABLET, FILM COATED ORAL
Qty: 30 TABLET | Refills: 3 | Status: SHIPPED | OUTPATIENT
Start: 2018-01-08 | End: 2018-04-16 | Stop reason: SDUPTHER

## 2018-01-08 RX ORDER — ICOSAPENT ETHYL 1000 MG/1
CAPSULE ORAL
Qty: 360 CAPSULE | Refills: 3 | Status: SHIPPED | OUTPATIENT
Start: 2018-01-08 | End: 2019-02-02 | Stop reason: SDUPTHER

## 2018-02-05 DIAGNOSIS — IMO0002 UNCONTROLLED TYPE 2 DIABETES MELLITUS WITH DIABETIC NEPHROPATHY, WITHOUT LONG-TERM CURRENT USE OF INSULIN: ICD-10-CM

## 2018-02-05 RX ORDER — METFORMIN HYDROCHLORIDE 500 MG/1
TABLET, EXTENDED RELEASE ORAL
Qty: 348 TABLET | Refills: 4 | Status: SHIPPED | OUTPATIENT
Start: 2018-02-05 | End: 2019-02-11 | Stop reason: SDUPTHER

## 2018-03-30 ENCOUNTER — LAB (OUTPATIENT)
Dept: INTERNAL MEDICINE | Facility: CLINIC | Age: 58
End: 2018-03-30

## 2018-03-30 DIAGNOSIS — Z00.00 PE (PHYSICAL EXAM), ROUTINE: Primary | ICD-10-CM

## 2018-03-30 LAB
25(OH)D3 SERPL-MCNC: 56.4 NG/ML
ALBUMIN SERPL-MCNC: 4.6 G/DL (ref 3.2–4.8)
ALBUMIN/GLOB SERPL: 1.8 G/DL (ref 1.5–2.5)
ALP SERPL-CCNC: 44 U/L (ref 25–100)
ALT SERPL W P-5'-P-CCNC: 20 U/L (ref 7–40)
ANION GAP SERPL CALCULATED.3IONS-SCNC: 7 MMOL/L (ref 3–11)
ARTICHOKE IGE QN: 88 MG/DL (ref 0–130)
AST SERPL-CCNC: 18 U/L (ref 0–33)
BASOPHILS # BLD AUTO: 0.05 10*3/MM3 (ref 0–0.2)
BASOPHILS NFR BLD AUTO: 0.9 % (ref 0–1)
BILIRUB SERPL-MCNC: 0.4 MG/DL (ref 0.3–1.2)
BILIRUB UR QL STRIP: NEGATIVE
BUN BLD-MCNC: 19 MG/DL (ref 9–23)
BUN/CREAT SERPL: 15.8 (ref 7–25)
CALCIUM SPEC-SCNC: 10 MG/DL (ref 8.7–10.4)
CHLORIDE SERPL-SCNC: 106 MMOL/L (ref 99–109)
CHOLEST SERPL-MCNC: 182 MG/DL (ref 0–200)
CK SERPL-CCNC: 100 U/L (ref 26–174)
CLARITY UR: CLEAR
CO2 SERPL-SCNC: 26 MMOL/L (ref 20–31)
COLOR UR: YELLOW
CREAT BLD-MCNC: 1.2 MG/DL (ref 0.6–1.3)
DEPRECATED RDW RBC AUTO: 42.3 FL (ref 37–54)
EOSINOPHIL # BLD AUTO: 0.12 10*3/MM3 (ref 0–0.3)
EOSINOPHIL NFR BLD AUTO: 2 % (ref 0–3)
ERYTHROCYTE [DISTWIDTH] IN BLOOD BY AUTOMATED COUNT: 13.7 % (ref 11.3–14.5)
GFR SERPL CREATININE-BSD FRML MDRD: 62 ML/MIN/1.73
GLOBULIN UR ELPH-MCNC: 2.5 GM/DL
GLUCOSE BLD-MCNC: 130 MG/DL (ref 70–100)
GLUCOSE UR STRIP-MCNC: ABNORMAL MG/DL
HBA1C MFR BLD: 7.2 % (ref 4.8–5.6)
HCT VFR BLD AUTO: 46.6 % (ref 38.9–50.9)
HDLC SERPL-MCNC: 34 MG/DL (ref 40–60)
HGB BLD-MCNC: 16 G/DL (ref 13.1–17.5)
HGB UR QL STRIP.AUTO: NEGATIVE
IMM GRANULOCYTES # BLD: 0.01 10*3/MM3 (ref 0–0.03)
IMM GRANULOCYTES NFR BLD: 0.2 % (ref 0–0.6)
KETONES UR QL STRIP: NEGATIVE
LEUKOCYTE ESTERASE UR QL STRIP.AUTO: NEGATIVE
LYMPHOCYTES # BLD AUTO: 3.3 10*3/MM3 (ref 0.6–4.8)
LYMPHOCYTES NFR BLD AUTO: 56.1 % (ref 24–44)
MCH RBC QN AUTO: 29 PG (ref 27–31)
MCHC RBC AUTO-ENTMCNC: 34.3 G/DL (ref 32–36)
MCV RBC AUTO: 84.4 FL (ref 80–99)
MONOCYTES # BLD AUTO: 0.44 10*3/MM3 (ref 0–1)
MONOCYTES NFR BLD AUTO: 7.5 % (ref 0–12)
NEUTROPHILS # BLD AUTO: 1.96 10*3/MM3 (ref 1.5–8.3)
NEUTROPHILS NFR BLD AUTO: 33.3 % (ref 41–71)
NITRITE UR QL STRIP: NEGATIVE
PH UR STRIP.AUTO: <=5 [PH] (ref 5–8)
PLATELET # BLD AUTO: 213 10*3/MM3 (ref 150–450)
PMV BLD AUTO: 11.1 FL (ref 6–12)
POTASSIUM BLD-SCNC: 4.4 MMOL/L (ref 3.5–5.5)
PROT SERPL-MCNC: 7.1 G/DL (ref 5.7–8.2)
PROT UR QL STRIP: NEGATIVE
PSA SERPL-MCNC: 0.78 NG/ML (ref 0–4)
RBC # BLD AUTO: 5.52 10*6/MM3 (ref 4.2–5.76)
SODIUM BLD-SCNC: 139 MMOL/L (ref 132–146)
SP GR UR STRIP: 1.03 (ref 1–1.03)
TRIGL SERPL-MCNC: 470 MG/DL (ref 0–150)
TSH SERPL DL<=0.05 MIU/L-ACNC: 3.46 MIU/ML (ref 0.35–5.35)
UROBILINOGEN UR QL STRIP: ABNORMAL
VIT B12 BLD-MCNC: 360 PG/ML (ref 211–911)
WBC NRBC COR # BLD: 5.88 10*3/MM3 (ref 3.5–10.8)

## 2018-03-30 PROCEDURE — 82306 VITAMIN D 25 HYDROXY: CPT | Performed by: INTERNAL MEDICINE

## 2018-03-30 PROCEDURE — 80061 LIPID PANEL: CPT | Performed by: INTERNAL MEDICINE

## 2018-03-30 PROCEDURE — 82550 ASSAY OF CK (CPK): CPT | Performed by: INTERNAL MEDICINE

## 2018-03-30 PROCEDURE — 82570 ASSAY OF URINE CREATININE: CPT | Performed by: INTERNAL MEDICINE

## 2018-03-30 PROCEDURE — 80053 COMPREHEN METABOLIC PANEL: CPT | Performed by: INTERNAL MEDICINE

## 2018-03-30 PROCEDURE — 83036 HEMOGLOBIN GLYCOSYLATED A1C: CPT | Performed by: INTERNAL MEDICINE

## 2018-03-30 PROCEDURE — 81003 URINALYSIS AUTO W/O SCOPE: CPT | Performed by: INTERNAL MEDICINE

## 2018-03-30 PROCEDURE — 82043 UR ALBUMIN QUANTITATIVE: CPT | Performed by: INTERNAL MEDICINE

## 2018-03-30 PROCEDURE — 84153 ASSAY OF PSA TOTAL: CPT | Performed by: INTERNAL MEDICINE

## 2018-03-30 PROCEDURE — 82607 VITAMIN B-12: CPT | Performed by: INTERNAL MEDICINE

## 2018-03-30 PROCEDURE — 85025 COMPLETE CBC W/AUTO DIFF WBC: CPT | Performed by: INTERNAL MEDICINE

## 2018-03-30 PROCEDURE — 84443 ASSAY THYROID STIM HORMONE: CPT | Performed by: INTERNAL MEDICINE

## 2018-03-31 LAB
CREAT 24H UR-MCNC: 70.4 MG/DL
MICROALBUMIN UR-MCNC: 4.8 UG/ML
MICROALBUMIN/CREAT UR: 6.8 MG/G CREAT (ref 0–30)

## 2018-04-16 ENCOUNTER — OFFICE VISIT (OUTPATIENT)
Dept: INTERNAL MEDICINE | Facility: CLINIC | Age: 58
End: 2018-04-16

## 2018-04-16 VITALS
HEART RATE: 96 BPM | DIASTOLIC BLOOD PRESSURE: 88 MMHG | RESPIRATION RATE: 18 BRPM | BODY MASS INDEX: 30.72 KG/M2 | WEIGHT: 207.38 LBS | HEIGHT: 69 IN | SYSTOLIC BLOOD PRESSURE: 142 MMHG

## 2018-04-16 DIAGNOSIS — I10 ESSENTIAL HYPERTENSION: ICD-10-CM

## 2018-04-16 DIAGNOSIS — E11.9 CONTROLLED TYPE 2 DIABETES MELLITUS WITHOUT COMPLICATION, WITHOUT LONG-TERM CURRENT USE OF INSULIN (HCC): ICD-10-CM

## 2018-04-16 DIAGNOSIS — Z00.00 PE (PHYSICAL EXAM), ROUTINE: Primary | ICD-10-CM

## 2018-04-16 DIAGNOSIS — E78.5 DYSLIPIDEMIA: ICD-10-CM

## 2018-04-16 DIAGNOSIS — E55.9 VITAMIN D DEFICIENCY: ICD-10-CM

## 2018-04-16 PROCEDURE — 99396 PREV VISIT EST AGE 40-64: CPT | Performed by: INTERNAL MEDICINE

## 2018-04-16 PROCEDURE — 93000 ELECTROCARDIOGRAM COMPLETE: CPT | Performed by: INTERNAL MEDICINE

## 2018-04-16 NOTE — PROGRESS NOTES
"Chief Complaint   Patient presents with   • Annual Exam       History of Present Illness  57 y.o.  gentleman presents for updated phys examination.  BGs have been 150s on average, both fasting and nonfasting.  Has had random BPs checked, not regularly, and overall stable in the 130s systolic.    Review of Systems  Denies headaches, visual changes, CP, palpitations, SOB, cough, abd pain, n/v/d, difficulty with urination, numbness/tingling, falls, mood changes, lightheadedness, hearing changes, rashes.     All other ROS reviewed and negative.    [facility]  The following portions of the patient's history were reviewed and updated as appropriate: allergies, current medications, past family history, past medical history, past social history, past surgical history and problem list.    Current Outpatient Prescriptions:   •  aspirin 81 MG tablet, QD  •  Cholecalciferol (VITAMIN D3) 5000 UNITS QD  •  EPINEPHrine (EPIPEN) 0.3 MG/0.3ML solution auto-injector AD  •  fenofibric acid (TRILIPIX) 135 MG capsule DQ  •  INVOKANA 300 MG tablet, QD  •  metFORMIN ER (GLUCOPHAGE-XR) 500 MG 4 QD  •  VASCEPA 1 g capsule capsule, 2 BID    VITALS:  /88 (BP Location: Right arm, Patient Position: Sitting)   Pulse 96   Resp 18   Ht 175.3 cm (69\")   Wt 94.1 kg (207 lb 6 oz)   BMI 30.62 kg/m²   Repeat BP L. Arm 152/94, R. Arm 156/100    Physical Exam   Constitutional: He is oriented to person, place, and time. He appears well-developed and well-nourished.   HENT:   Head: Normocephalic.   Right Ear: External ear normal.   Left Ear: External ear normal.   Nose: Nose normal.   Mouth/Throat: Oropharynx is clear and moist and mucous membranes are normal. No oropharyngeal exudate.   Eyes: Conjunctivae and EOM are normal. Pupils are equal, round, and reactive to light.   Neck: Normal range of motion. Neck supple. Carotid bruit is not present (bilaterally). No thyromegaly present.   Cardiovascular: Normal rate, regular rhythm, normal " heart sounds and intact distal pulses.    Pulmonary/Chest: Effort normal and breath sounds normal. No respiratory distress. He has no wheezes. He has no rales.   Abdominal: Soft. Bowel sounds are normal. He exhibits no distension and no mass. There is no hepatosplenomegaly. There is no tenderness.   Genitourinary:   Genitourinary Comments: /CASSANDRA deferred per pt   Musculoskeletal: Normal range of motion. He exhibits no edema.    Bradley had a diabetic foot exam performed today.  Vascular Status -  His right foot exhibits normal foot vasculature  and no edema. His left foot exhibits normal foot vasculature  and no edema.  Skin Integrity  -  His right foot skin is intact.His left foot skin is intact..  Lymphadenopathy:     He has no cervical adenopathy.   Neurological: He is alert and oriented to person, place, and time. He has normal reflexes. He displays normal reflexes. No cranial nerve deficit.   Skin: Skin is warm and dry. No rash noted.   Psychiatric: He has a normal mood and affect. His behavior is normal.   Nursing note and vitals reviewed.      LABS  Results for orders placed or performed in visit on 03/30/18   Comprehensive metabolic panel   Result Value Ref Range    Glucose 130 (H) 70 - 100 mg/dL    BUN 19 9 - 23 mg/dL    Creatinine 1.20 0.60 - 1.30 mg/dL    Sodium 139 132 - 146 mmol/L    Potassium 4.4 3.5 - 5.5 mmol/L    Chloride 106 99 - 109 mmol/L    CO2 26.0 20.0 - 31.0 mmol/L    Calcium 10.0 8.7 - 10.4 mg/dL    Total Protein 7.1 5.7 - 8.2 g/dL    Albumin 4.60 3.20 - 4.80 g/dL    ALT (SGPT) 20 7 - 40 U/L    AST (SGOT) 18 0 - 33 U/L    Alkaline Phosphatase 44 25 - 100 U/L    Total Bilirubin 0.4 0.3 - 1.2 mg/dL    eGFR Non African Amer 62 >60 mL/min/1.73    Globulin 2.5 gm/dL    A/G Ratio 1.8 1.5 - 2.5 g/dL    BUN/Creatinine Ratio 15.8 7.0 - 25.0    Anion Gap 7.0 3.0 - 11.0 mmol/L   TSH   Result Value Ref Range    TSH 3.463 0.350 - 5.350 mIU/mL   Lipid panel   Result Value Ref Range    Total Cholesterol  182 0 - 200 mg/dL    Triglycerides 470 (H) 0 - 150 mg/dL    HDL Cholesterol 34 (L) 40 - 60 mg/dL    LDL Cholesterol  88 0 - 130 mg/dL   Hemoglobin A1c   Result Value Ref Range    Hemoglobin A1C 7.20 (H) 4.80 - 5.60 %   PSA DIAGNOSTIC ONLY   Result Value Ref Range    PSA 0.780 0.000 - 4.000 ng/mL   CK   Result Value Ref Range    Creatine Kinase 100 26 - 174 U/L   Vitamin D 25 hydroxy   Result Value Ref Range    25 Hydroxy, Vitamin D 56.4 ng/ml   Vitamin B12   Result Value Ref Range    Vitamin B-12 360 211 - 911 pg/mL   CBC Auto Differential   Result Value Ref Range    WBC 5.88 3.50 - 10.80 10*3/mm3    RBC 5.52 4.20 - 5.76 10*6/mm3    Hemoglobin 16.0 13.1 - 17.5 g/dL    Hematocrit 46.6 38.9 - 50.9 %    MCV 84.4 80.0 - 99.0 fL    MCH 29.0 27.0 - 31.0 pg    MCHC 34.3 32.0 - 36.0 g/dL    RDW 13.7 11.3 - 14.5 %    RDW-SD 42.3 37.0 - 54.0 fl    MPV 11.1 6.0 - 12.0 fL    Platelets 213 150 - 450 10*3/mm3    Neutrophil % 33.3 (L) 41.0 - 71.0 %    Lymphocyte % 56.1 (H) 24.0 - 44.0 %    Monocyte % 7.5 0.0 - 12.0 %    Eosinophil % 2.0 0.0 - 3.0 %    Basophil % 0.9 0.0 - 1.0 %    Immature Grans % 0.2 0.0 - 0.6 %    Neutrophils, Absolute 1.96 1.50 - 8.30 10*3/mm3    Lymphocytes, Absolute 3.30 0.60 - 4.80 10*3/mm3    Monocytes, Absolute 0.44 0.00 - 1.00 10*3/mm3    Eosinophils, Absolute 0.12 0.00 - 0.30 10*3/mm3    Basophils, Absolute 0.05 0.00 - 0.20 10*3/mm3    Immature Grans, Absolute 0.01 0.00 - 0.03 10*3/mm3   Microalbumin / Creatinine Urine Ratio - Urine, Clean Catch   Result Value Ref Range    Creatinine, Urine 70.4 Not Estab. mg/dL    Microalbumin, Urine 4.8 Not Estab. ug/mL    Microalbumin/Creatinine Ratio 6.8 0.0 - 30.0 mg/g creat   Urinalysis With / Microscopic If Indicated - Urine, Clean Catch   Result Value Ref Range    Color, UA Yellow Yellow, Straw    Appearance, UA Clear Clear    pH, UA <=5.0 5.0 - 8.0    Specific Gravity, UA 1.034 (H) 1.001 - 1.030    Glucose, UA >=1000 mg/dL (3+) (A) Negative    Ketones, UA  Negative Negative    Bilirubin, UA Negative Negative    Blood, UA Negative Negative    Protein, UA Negative Negative    Leuk Esterase, UA Negative Negative    Nitrite, UA Negative Negative    Urobilinogen, UA 0.2 E.U./dL 0.2 - 1.0 E.U./dL     1/18 A1C 6.3  9/17 A1C 5.9, , , HDL 33,     ECG 12 Lead  Date/Time: 4/16/2018 4:37 PM  Performed by: SPENCER TIDWELL  Authorized by: SPENCER TIDWELL   Comparison: compared with previous ECG from 3/3/2017  Rhythm: sinus rhythm  Rate: normal  BPM: 92  Conduction: conduction normal  ST Segments: ST segments normal  QRS axis: normal  Other findings: PRWP  Other findings comments: inverted T wave III, unchanged  Clinical impression comment: stable EKG            ASSESSMENT/PLAN  Problem List Items Addressed This Visit     Dyslipidemia     TGs remain elevated, LDL at goal (88); on vascepa 2 BID - renew as needed         Relevant Orders    Lipid Panel    Hypertension     BP elevated and repeat BPs even higher, goal < 130/85; remains off of triam HCTZ (stopped for hypercalcemia); start checking BPs at home with low Na diet and healthy exercise; f/u at the latest in 3 mos but earlier if remains consistently > 140/90         Controlled type 2 diabetes mellitus      BG control worsened with A1C 7.2; remain on met 2000 units QD and invokana 300mg QD; encouraged reg phys activity to decr insulin resistance, moderation in unhealthy starches/sweets; f/u A1C in 3 mos           Relevant Medications    Canagliflozin (INVOKANA) 300 MG tablet    Other Relevant Orders    Hemoglobin A1c    Vitamin D deficiency     Stable level 56.4 on 5000 units QD supplementation         PE (physical exam), routine - Primary     Health maintenance - flu vacc 10/17; PVX 9/16, plan for Prevnar at age 65; Tdap 7/11, Zostavax 11/13; CASSANDRA deferred by patient today with stable PSA 0.78; nl colonosc 6/15, repeat 2020 per Dr. Brandon; eye exam 1/18 with Dr. Keith (no DM retinopathy); dental exam UTD every 6 mos  (pending 5/10/18); (+) seat belt use           Other Visit Diagnoses    None.         FOLLOW-UP  RTC 3 mos with A1C, lipids (escribed) and for BP check    Electronically signed by:    Violette Hylton MD  04/16/2018

## 2018-04-16 NOTE — ASSESSMENT & PLAN NOTE
BP elevated and repeat BPs even higher, goal < 130/85; remains off of triam HCTZ (stopped for hypercalcemia); start checking BPs at home with low Na diet and healthy exercise; f/u at the latest in 3 mos but earlier if remains consistently > 140/90

## 2018-04-16 NOTE — ASSESSMENT & PLAN NOTE
BG control worsened with A1C 7.2; remain on met 2000 units QD and invokana 300mg QD; encouraged reg phys activity to decr insulin resistance, moderation in unhealthy starches/sweets; f/u A1C in 3 mos

## 2018-04-16 NOTE — ASSESSMENT & PLAN NOTE
Health maintenance - flu vacc 10/17; PVX 9/16, plan for Prevnar at age 65; Tdap 7/11, Zostavax 11/13; CASSANDRA deferred by patient today with stable PSA 0.78; nl colonosc 6/15, repeat 2020 per Dr. Brandon; eye exam 1/18 with Dr. Keith (no DM retinopathy); dental exam UTD every 6 mos (pending 5/10/18); (+) seat belt use

## 2018-04-17 RX ORDER — FENOFIBRIC ACID 135 MG/1
CAPSULE, DELAYED RELEASE ORAL
Qty: 90 CAPSULE | Refills: 1 | Status: SHIPPED | OUTPATIENT
Start: 2018-04-17 | End: 2018-11-12 | Stop reason: SDUPTHER

## 2018-07-20 ENCOUNTER — LAB (OUTPATIENT)
Dept: INTERNAL MEDICINE | Facility: CLINIC | Age: 58
End: 2018-07-20

## 2018-07-20 DIAGNOSIS — E78.5 DYSLIPIDEMIA: ICD-10-CM

## 2018-07-20 DIAGNOSIS — E11.9 CONTROLLED TYPE 2 DIABETES MELLITUS WITHOUT COMPLICATION, WITHOUT LONG-TERM CURRENT USE OF INSULIN (HCC): ICD-10-CM

## 2018-07-20 LAB
ARTICHOKE IGE QN: 109 MG/DL (ref 0–130)
CHOLEST SERPL-MCNC: 183 MG/DL (ref 0–200)
HBA1C MFR BLD: 6.4 % (ref 4.8–5.6)
HDLC SERPL-MCNC: 34 MG/DL (ref 40–60)
TRIGL SERPL-MCNC: 338 MG/DL (ref 0–150)

## 2018-07-20 PROCEDURE — 83036 HEMOGLOBIN GLYCOSYLATED A1C: CPT | Performed by: INTERNAL MEDICINE

## 2018-07-20 PROCEDURE — 80061 LIPID PANEL: CPT | Performed by: INTERNAL MEDICINE

## 2018-07-27 ENCOUNTER — OFFICE VISIT (OUTPATIENT)
Dept: INTERNAL MEDICINE | Facility: CLINIC | Age: 58
End: 2018-07-27

## 2018-07-27 VITALS
DIASTOLIC BLOOD PRESSURE: 82 MMHG | SYSTOLIC BLOOD PRESSURE: 130 MMHG | RESPIRATION RATE: 18 BRPM | BODY MASS INDEX: 28.94 KG/M2 | HEART RATE: 78 BPM | WEIGHT: 196 LBS

## 2018-07-27 DIAGNOSIS — E11.9 CONTROLLED TYPE 2 DIABETES MELLITUS WITHOUT COMPLICATION, WITHOUT LONG-TERM CURRENT USE OF INSULIN (HCC): Primary | ICD-10-CM

## 2018-07-27 DIAGNOSIS — I10 ESSENTIAL HYPERTENSION: ICD-10-CM

## 2018-07-27 DIAGNOSIS — E78.5 DYSLIPIDEMIA: ICD-10-CM

## 2018-07-27 PROCEDURE — 99214 OFFICE O/P EST MOD 30 MIN: CPT | Performed by: INTERNAL MEDICINE

## 2018-07-27 RX ORDER — EPINEPHRINE 0.3 MG/.3ML
INJECTION SUBCUTANEOUS
Qty: 1 EACH | Refills: 1 | Status: SHIPPED | OUTPATIENT
Start: 2018-07-27 | End: 2019-05-17 | Stop reason: SDUPTHER

## 2018-07-27 RX ORDER — EPINEPHRINE 0.3 MG/.3ML
INJECTION SUBCUTANEOUS
Qty: 1 EACH | Refills: 1 | Status: SHIPPED | OUTPATIENT
Start: 2018-07-27 | End: 2018-07-27 | Stop reason: SDUPTHER

## 2018-07-27 NOTE — ASSESSMENT & PLAN NOTE
Lipids bord/stable with   (was 88 in 3/18) with goal LDL < 100 and improved/persistently elevated TGs; remains on vascepa 1gm 2 BID and trilipix 135mg QD; strongly encouraged strict low fat, decreased saturated fats diet

## 2018-07-27 NOTE — ASSESSMENT & PLAN NOTE
Improved A1C from 7.2 (3/18) down to 6.4; remains on met XR 500mg 4 QD and invokana 300mg QD; encouraged reg phys activity to decr insulin resistance, moderation in unhealthy starches/sweets; f/u A1C in 3 mos

## 2018-07-27 NOTE — PROGRESS NOTES
Chief Complaint   Patient presents with   • impaired fasting glucose     3mo fu   • Hyperlipidemia     3mo fu       History of Present Illness  57 y.o.  gentleman presents for DM, chol, and BP follow-up.  Has questions re: dementia and metformin. Requesting epi pen renewal; has not had to use it and it has .    Review of Systems  Denies visual changes, headaches, CP, palpitations, SOB.   All other ROS reviewed and negative.    [facility]  The following portions of the patient's history were reviewed and updated as appropriate: allergies, current medications, past family history, past medical history, past social history, past surgical history and problem list.      Current Outpatient Prescriptions:   •  aspirin 81 MG tablet, QD  •  Canagliflozin (INVOKANA) 300 MG tablet, QD  •  Cholecalciferol (VITAMIN D3) 5000 UNITS capsule QD  •  EPINEPHrine (EPIPEN) 0.3 MG/0.3ML solution auto-injector injection, AD  •  fenofibric acid (TRILIPIX) 135 MG capsule QD  •  metFORMIN ER (GLUCOPHAGE-XR) 500 MG 4 QD  •  VASCEPA 1 g capsule capsule, 2 BID    VITALS:  /82 (BP Location: Right arm, Patient Position: Sitting)   Pulse 78   Resp 18   Wt 88.9 kg (196 lb)   BMI 28.94 kg/m²     Physical Exam   Constitutional: He is oriented to person, place, and time. He appears well-developed and well-nourished.   Eyes: Conjunctivae and EOM are normal.   Cardiovascular: Normal rate, regular rhythm and normal heart sounds.    Pulmonary/Chest: Effort normal and breath sounds normal.   Abdominal: Soft. Bowel sounds are normal.   Musculoskeletal:   Normal gait   Neurological: He is alert and oriented to person, place, and time.   Psychiatric: He has a normal mood and affect. His behavior is normal.   Nursing note and vitals reviewed.      LABS  Results for orders placed or performed in visit on 18   Lipid Panel   Result Value Ref Range    Total Cholesterol 183 0 - 200 mg/dL    Triglycerides 338 (H) 0 - 150 mg/dL    HDL  Cholesterol 34 (L) 40 - 60 mg/dL    LDL Cholesterol  109 0 - 130 mg/dL   Hemoglobin A1c   Result Value Ref Range    Hemoglobin A1C 6.40 (H) 4.80 - 5.60 %     3/18 A1C 7.2, , , HDL 34, LDL 88    ASSESSMENT/PLAN  Problem List Items Addressed This Visit     Dyslipidemia     Lipids bord/stable with   (was 88 in 3/18) with goal LDL < 100 and improved/persistently elevated TGs; remains on vascepa 1gm 2 BID and trilipix 135mg QD; strongly encouraged strict low fat, decreased saturated fats diet         Hypertension     BP improved with goal < 130/80; rec low Na diet, increased aerobic exercise; home BP monitoring with goal BP < 130/80           Relevant Medications    EPINEPHrine (EPIPEN) 0.3 MG/0.3ML solution auto-injector injection    Controlled type 2 diabetes mellitus  - Primary     Improved A1C from 7.2 (3/18) down to 6.4; remains on met XR 500mg 4 QD and invokana 300mg QD; encouraged reg phys activity to decr insulin resistance, moderation in unhealthy starches/sweets; f/u A1C in 3 mos                 FOLLOW-UP  1. Health maintenance - discussed Shingrix again  2. RTC 3 mos with A1C  3. Next PE due after 4/6/19    Electronically signed by:    Violette Hylton MD  07/27/2018

## 2018-07-27 NOTE — ASSESSMENT & PLAN NOTE
BP improved with goal < 130/80; rec low Na diet, increased aerobic exercise; home BP monitoring with goal BP < 130/80

## 2018-11-02 ENCOUNTER — OFFICE VISIT (OUTPATIENT)
Dept: INTERNAL MEDICINE | Facility: CLINIC | Age: 58
End: 2018-11-02

## 2018-11-02 VITALS
OXYGEN SATURATION: 97 % | HEART RATE: 77 BPM | BODY MASS INDEX: 29.77 KG/M2 | WEIGHT: 201 LBS | DIASTOLIC BLOOD PRESSURE: 88 MMHG | SYSTOLIC BLOOD PRESSURE: 127 MMHG | HEIGHT: 69 IN | RESPIRATION RATE: 16 BRPM

## 2018-11-02 DIAGNOSIS — E11.9 CONTROLLED TYPE 2 DIABETES MELLITUS WITHOUT COMPLICATION, WITHOUT LONG-TERM CURRENT USE OF INSULIN (HCC): Primary | ICD-10-CM

## 2018-11-02 DIAGNOSIS — I10 ESSENTIAL HYPERTENSION: ICD-10-CM

## 2018-11-02 LAB — HBA1C MFR BLD: 6.4 %

## 2018-11-02 PROCEDURE — 83036 HEMOGLOBIN GLYCOSYLATED A1C: CPT | Performed by: INTERNAL MEDICINE

## 2018-11-02 PROCEDURE — 99213 OFFICE O/P EST LOW 20 MIN: CPT | Performed by: INTERNAL MEDICINE

## 2018-11-02 NOTE — ASSESSMENT & PLAN NOTE
BG control stable with A1C 6.4; remains on invokana 300mf QD and met XR 500mg 4 QD; encouraged reg phys activity to decr insulin resistance, moderation in unhealthy starches/sweets; f/u A1C in 3 mos

## 2018-11-12 RX ORDER — FENOFIBRIC ACID 135 MG/1
CAPSULE, DELAYED RELEASE ORAL
Qty: 90 CAPSULE | Refills: 1 | Status: SHIPPED | OUTPATIENT
Start: 2018-11-12 | End: 2019-05-06 | Stop reason: SDUPTHER

## 2019-02-01 ENCOUNTER — OFFICE VISIT (OUTPATIENT)
Dept: INTERNAL MEDICINE | Facility: CLINIC | Age: 59
End: 2019-02-01

## 2019-02-01 VITALS
WEIGHT: 202 LBS | BODY MASS INDEX: 29.92 KG/M2 | OXYGEN SATURATION: 99 % | HEIGHT: 69 IN | HEART RATE: 67 BPM | SYSTOLIC BLOOD PRESSURE: 130 MMHG | DIASTOLIC BLOOD PRESSURE: 80 MMHG

## 2019-02-01 DIAGNOSIS — I10 ESSENTIAL HYPERTENSION: ICD-10-CM

## 2019-02-01 DIAGNOSIS — H81.02 MENIERE'S DISEASE OF LEFT EAR: ICD-10-CM

## 2019-02-01 DIAGNOSIS — E11.9 CONTROLLED TYPE 2 DIABETES MELLITUS WITHOUT COMPLICATION, WITHOUT LONG-TERM CURRENT USE OF INSULIN (HCC): Primary | ICD-10-CM

## 2019-02-01 LAB — HBA1C MFR BLD: 6.3 %

## 2019-02-01 PROCEDURE — 83036 HEMOGLOBIN GLYCOSYLATED A1C: CPT | Performed by: INTERNAL MEDICINE

## 2019-02-01 PROCEDURE — 99214 OFFICE O/P EST MOD 30 MIN: CPT | Performed by: INTERNAL MEDICINE

## 2019-02-01 RX ORDER — PROMETHAZINE HYDROCHLORIDE 25 MG/1
25 TABLET ORAL EVERY 8 HOURS PRN
Qty: 5 TABLET | Refills: 0 | Status: SHIPPED | OUTPATIENT
Start: 2019-02-01 | End: 2022-06-07

## 2019-02-01 NOTE — ASSESSMENT & PLAN NOTE
BG control stable with A1C 6.3; remains on met XR 500mg 4 QD and invokana 300mg QD; encouraged reg phys activity to decr insulin resistance, moderation in unhealthy starches/sweets; f/u A1C in 3 mos with next PE

## 2019-02-01 NOTE — PROGRESS NOTES
"Chief Complaint   Patient presents with   • Diabetes       History of Present Illness  58 y.o.  gentleman presents for DM follow-up.  Feels well overall without complaints.  Not regularly checking BGs.    Review of Systems  ROS (+) for chronic tinnitus.  Notes  phenergan, which he carries with him for poss nausea from menier's in the left ear.  Last attack awas about 5 yrs ago. All other ROS reviewed and negative.    [facility]  The following portions of the patient's history were reviewed and updated as appropriate: allergies, current medications, past family history, past medical history, past social history, past surgical history and problem list.    Current Outpatient Medications:   •  aspirin 81 MG tablet, QD  •  Canagliflozin (INVOKANA) 300 MG QD  •  Cholecalciferol (VITAMIN D3) 5000 UNITS capsuleQD  •  EPINEPHrine (EPIPEN) 0.3 MG/0.3ML solution prn  •  fenofibric acid (TRILIPIX) 135 MG capsule QD  •  metFORMIN ER (GLUCOPHAGE-XR) 500 MG 4 QD  •  VASCEPA 1 g capsule 2 BIDS    VITALS:  /80   Pulse 67   Ht 175.3 cm (69\")   Wt 91.6 kg (202 lb)   SpO2 99%   BMI 29.83 kg/m²     Physical Exam   Constitutional: He is oriented to person, place, and time. He appears well-developed and well-nourished.   Eyes: Conjunctivae and EOM are normal.   Cardiovascular: Normal rate, regular rhythm and normal heart sounds.   Pulmonary/Chest: Effort normal and breath sounds normal.   Abdominal: Soft. Bowel sounds are normal.   Neurological: He is alert and oriented to person, place, and time.   Psychiatric: He has a normal mood and affect. His behavior is normal.   Nursing note and vitals reviewed.      LABS  Results for orders placed or performed in visit on 19   POC Glycosylated Hemoglobin (Hb A1C)   Result Value Ref Range    Hemoglobin A1C 6.3 %       ASSESSMENT/PLAN  Problem List Items Addressed This Visit     Hypertension     BP remains stable; no meds          Meniere disease     asx > 5 yrs; renew " phenergan 25mg prn #5, 0RF         Relevant Medications    promethazine (PHENERGAN) 25 MG tablet    Controlled type 2 diabetes mellitus  - Primary     BG control stable with A1C 6.3; remains on met XR 500mg 4 QD and invokana 300mg QD; encouraged reg phys activity to decr insulin resistance, moderation in unhealthy starches/sweets; f/u A1C in 3 mos with next PE         Relevant Orders    POC Glycosylated Hemoglobin (Hb A1C) (Completed)          FOLLOW-UP  1. Health maintenance - need updated eye exam before next week (need note)  2. RTC 5/17/19 for PE as scheduled; fasting labs wk prior to appt (CBC, CMP, TSH, lipids, UA/micr, microalb, A1C, PSA, CPK, vit D, B12)    Electronically signed by:    Violette Hylton MD  02/01/2019

## 2019-02-02 DIAGNOSIS — E78.5 DYSLIPIDEMIA: ICD-10-CM

## 2019-02-02 RX ORDER — ICOSAPENT ETHYL 1000 MG/1
CAPSULE ORAL
Qty: 360 CAPSULE | Refills: 3 | Status: SHIPPED | OUTPATIENT
Start: 2019-02-02 | End: 2020-04-13

## 2019-02-11 DIAGNOSIS — IMO0002 UNCONTROLLED TYPE 2 DIABETES MELLITUS WITH DIABETIC NEPHROPATHY, WITHOUT LONG-TERM CURRENT USE OF INSULIN: ICD-10-CM

## 2019-02-11 RX ORDER — METFORMIN HYDROCHLORIDE 500 MG/1
TABLET, EXTENDED RELEASE ORAL
Qty: 348 TABLET | Refills: 4 | Status: SHIPPED | OUTPATIENT
Start: 2019-02-11 | End: 2019-05-17 | Stop reason: SDUPTHER

## 2019-04-24 ENCOUNTER — TELEPHONE (OUTPATIENT)
Dept: INTERNAL MEDICINE | Facility: CLINIC | Age: 59
End: 2019-04-24

## 2019-04-24 DIAGNOSIS — E11.9 CONTROLLED TYPE 2 DIABETES MELLITUS WITHOUT COMPLICATION, WITHOUT LONG-TERM CURRENT USE OF INSULIN (HCC): ICD-10-CM

## 2019-04-24 DIAGNOSIS — I10 ESSENTIAL HYPERTENSION: ICD-10-CM

## 2019-04-24 DIAGNOSIS — Z00.00 PE (PHYSICAL EXAM), ROUTINE: Primary | ICD-10-CM

## 2019-04-24 DIAGNOSIS — E78.5 DYSLIPIDEMIA: ICD-10-CM

## 2019-04-24 DIAGNOSIS — E55.9 VITAMIN D DEFICIENCY: ICD-10-CM

## 2019-05-06 DIAGNOSIS — E11.9 CONTROLLED TYPE 2 DIABETES MELLITUS WITHOUT COMPLICATION, WITHOUT LONG-TERM CURRENT USE OF INSULIN (HCC): ICD-10-CM

## 2019-05-06 RX ORDER — FENOFIBRIC ACID 135 MG/1
CAPSULE, DELAYED RELEASE ORAL
Qty: 90 CAPSULE | Refills: 1 | Status: SHIPPED | OUTPATIENT
Start: 2019-05-06 | End: 2019-05-17 | Stop reason: SDUPTHER

## 2019-05-06 RX ORDER — CANAGLIFLOZIN 300 MG/1
TABLET, FILM COATED ORAL
Qty: 90 TABLET | Refills: 3 | Status: SHIPPED | OUTPATIENT
Start: 2019-05-06 | End: 2020-04-13

## 2019-05-10 ENCOUNTER — LAB (OUTPATIENT)
Dept: INTERNAL MEDICINE | Facility: CLINIC | Age: 59
End: 2019-05-10

## 2019-05-10 DIAGNOSIS — Z00.00 PE (PHYSICAL EXAM), ROUTINE: ICD-10-CM

## 2019-05-10 DIAGNOSIS — E78.5 DYSLIPIDEMIA: ICD-10-CM

## 2019-05-10 DIAGNOSIS — E11.9 CONTROLLED TYPE 2 DIABETES MELLITUS WITHOUT COMPLICATION, WITHOUT LONG-TERM CURRENT USE OF INSULIN (HCC): ICD-10-CM

## 2019-05-10 DIAGNOSIS — I10 ESSENTIAL HYPERTENSION: ICD-10-CM

## 2019-05-10 DIAGNOSIS — E55.9 VITAMIN D DEFICIENCY: ICD-10-CM

## 2019-05-10 LAB
25(OH)D3 SERPL-MCNC: 56.4 NG/ML (ref 30–100)
ALBUMIN SERPL-MCNC: 4.6 G/DL (ref 3.5–5.2)
ALBUMIN UR-MCNC: <1.2 MG/L
ALBUMIN/GLOB SERPL: 1.9 G/DL
ALP SERPL-CCNC: 33 U/L (ref 39–117)
ALT SERPL W P-5'-P-CCNC: 25 U/L (ref 1–41)
ANION GAP SERPL CALCULATED.3IONS-SCNC: 11.9 MMOL/L
ARTICHOKE IGE QN: 86 MG/DL (ref 0–100)
AST SERPL-CCNC: 24 U/L (ref 1–40)
BACTERIA UR QL AUTO: NORMAL /HPF
BASOPHILS # BLD AUTO: 0.07 10*3/MM3 (ref 0–0.2)
BASOPHILS NFR BLD AUTO: 1.3 % (ref 0–1.5)
BILIRUB SERPL-MCNC: 0.5 MG/DL (ref 0.2–1.2)
BILIRUB UR QL STRIP: NEGATIVE
BUN BLD-MCNC: 13 MG/DL (ref 6–20)
BUN/CREAT SERPL: 12.4 (ref 7–25)
CALCIUM SPEC-SCNC: 10.2 MG/DL (ref 8.6–10.5)
CHLORIDE SERPL-SCNC: 104 MMOL/L (ref 98–107)
CHOLEST SERPL-MCNC: 186 MG/DL (ref 0–200)
CK SERPL-CCNC: 125 U/L (ref 20–200)
CLARITY UR: CLEAR
CO2 SERPL-SCNC: 23.1 MMOL/L (ref 22–29)
COLOR UR: YELLOW
CREAT BLD-MCNC: 1.05 MG/DL (ref 0.76–1.27)
CREAT UR-MCNC: 73.7 MG/DL
DEPRECATED RDW RBC AUTO: 44.4 FL (ref 37–54)
EOSINOPHIL # BLD AUTO: 0.11 10*3/MM3 (ref 0–0.4)
EOSINOPHIL NFR BLD AUTO: 2.1 % (ref 0.3–6.2)
ERYTHROCYTE [DISTWIDTH] IN BLOOD BY AUTOMATED COUNT: 13.8 % (ref 12.3–15.4)
GFR SERPL CREATININE-BSD FRML MDRD: 73 ML/MIN/1.73
GLOBULIN UR ELPH-MCNC: 2.4 GM/DL
GLUCOSE BLD-MCNC: 125 MG/DL (ref 65–99)
GLUCOSE UR STRIP-MCNC: ABNORMAL MG/DL
HBA1C MFR BLD: 6.56 % (ref 4.8–5.6)
HCT VFR BLD AUTO: 49.2 % (ref 37.5–51)
HDLC SERPL-MCNC: 29 MG/DL (ref 40–60)
HGB BLD-MCNC: 15.9 G/DL (ref 13–17.7)
HGB UR QL STRIP.AUTO: NEGATIVE
HYALINE CASTS UR QL AUTO: NORMAL /LPF
IMM GRANULOCYTES # BLD AUTO: 0.02 10*3/MM3 (ref 0–0.05)
IMM GRANULOCYTES NFR BLD AUTO: 0.4 % (ref 0–0.5)
KETONES UR QL STRIP: NEGATIVE
LDLC SERPL CALC-MCNC: ABNORMAL MG/DL (ref 0–100)
LDLC/HDLC SERPL: ABNORMAL {RATIO}
LEUKOCYTE ESTERASE UR QL STRIP.AUTO: NEGATIVE
LYMPHOCYTES # BLD AUTO: 2.69 10*3/MM3 (ref 0.7–3.1)
LYMPHOCYTES NFR BLD AUTO: 50.4 % (ref 19.6–45.3)
MCH RBC QN AUTO: 28.5 PG (ref 26.6–33)
MCHC RBC AUTO-ENTMCNC: 32.3 G/DL (ref 31.5–35.7)
MCV RBC AUTO: 88.3 FL (ref 79–97)
MICROALBUMIN/CREAT UR: NORMAL MG/G
MONOCYTES # BLD AUTO: 0.43 10*3/MM3 (ref 0.1–0.9)
MONOCYTES NFR BLD AUTO: 8.1 % (ref 5–12)
NEUTROPHILS # BLD AUTO: 2.02 10*3/MM3 (ref 1.7–7)
NEUTROPHILS NFR BLD AUTO: 37.7 % (ref 42.7–76)
NITRITE UR QL STRIP: NEGATIVE
NRBC BLD AUTO-RTO: 0.2 /100 WBC (ref 0–0.2)
PH UR STRIP.AUTO: <=5 [PH] (ref 5–8)
PLATELET # BLD AUTO: 210 10*3/MM3 (ref 140–450)
PMV BLD AUTO: 10.8 FL (ref 6–12)
POTASSIUM BLD-SCNC: 4.5 MMOL/L (ref 3.5–5.2)
PROT SERPL-MCNC: 7 G/DL (ref 6–8.5)
PROT UR QL STRIP: NEGATIVE
PSA SERPL-MCNC: 1.43 NG/ML (ref 0–4)
RBC # BLD AUTO: 5.57 10*6/MM3 (ref 4.14–5.8)
RBC # UR: NORMAL /HPF
REF LAB TEST METHOD: NORMAL
SODIUM BLD-SCNC: 139 MMOL/L (ref 136–145)
SP GR UR STRIP: >1.03 (ref 1–1.03)
SQUAMOUS #/AREA URNS HPF: NORMAL /HPF
TRIGL SERPL-MCNC: 488 MG/DL (ref 0–150)
TSH SERPL DL<=0.05 MIU/L-ACNC: 3.52 MIU/ML (ref 0.27–4.2)
UROBILINOGEN UR QL STRIP: ABNORMAL
VIT B12 BLD-MCNC: 259 PG/ML (ref 211–946)
VLDLC SERPL-MCNC: ABNORMAL MG/DL (ref 5–40)
WBC NRBC COR # BLD: 5.34 10*3/MM3 (ref 3.4–10.8)
WBC UR QL AUTO: NORMAL /HPF

## 2019-05-10 PROCEDURE — 82043 UR ALBUMIN QUANTITATIVE: CPT | Performed by: INTERNAL MEDICINE

## 2019-05-10 PROCEDURE — 80061 LIPID PANEL: CPT | Performed by: INTERNAL MEDICINE

## 2019-05-10 PROCEDURE — 81001 URINALYSIS AUTO W/SCOPE: CPT | Performed by: INTERNAL MEDICINE

## 2019-05-10 PROCEDURE — G0103 PSA SCREENING: HCPCS | Performed by: INTERNAL MEDICINE

## 2019-05-10 PROCEDURE — 85025 COMPLETE CBC W/AUTO DIFF WBC: CPT | Performed by: INTERNAL MEDICINE

## 2019-05-10 PROCEDURE — 83721 ASSAY OF BLOOD LIPOPROTEIN: CPT

## 2019-05-10 PROCEDURE — 80053 COMPREHEN METABOLIC PANEL: CPT | Performed by: INTERNAL MEDICINE

## 2019-05-10 PROCEDURE — 82306 VITAMIN D 25 HYDROXY: CPT | Performed by: INTERNAL MEDICINE

## 2019-05-10 PROCEDURE — 83036 HEMOGLOBIN GLYCOSYLATED A1C: CPT | Performed by: INTERNAL MEDICINE

## 2019-05-10 PROCEDURE — 82607 VITAMIN B-12: CPT | Performed by: INTERNAL MEDICINE

## 2019-05-10 PROCEDURE — 82550 ASSAY OF CK (CPK): CPT | Performed by: INTERNAL MEDICINE

## 2019-05-10 PROCEDURE — 82570 ASSAY OF URINE CREATININE: CPT | Performed by: INTERNAL MEDICINE

## 2019-05-10 PROCEDURE — 84443 ASSAY THYROID STIM HORMONE: CPT | Performed by: INTERNAL MEDICINE

## 2019-05-11 PROBLEM — R97.20 INCREASED PROSTATE SPECIFIC ANTIGEN (PSA) VELOCITY: Status: ACTIVE | Noted: 2019-05-11

## 2019-05-17 ENCOUNTER — OFFICE VISIT (OUTPATIENT)
Dept: INTERNAL MEDICINE | Facility: CLINIC | Age: 59
End: 2019-05-17

## 2019-05-17 VITALS
HEART RATE: 68 BPM | HEIGHT: 69 IN | BODY MASS INDEX: 30.07 KG/M2 | SYSTOLIC BLOOD PRESSURE: 120 MMHG | WEIGHT: 203 LBS | OXYGEN SATURATION: 100 % | DIASTOLIC BLOOD PRESSURE: 78 MMHG

## 2019-05-17 DIAGNOSIS — E11.65 CONTROLLED TYPE 2 DIABETES MELLITUS WITH HYPERGLYCEMIA, WITHOUT LONG-TERM CURRENT USE OF INSULIN (HCC): ICD-10-CM

## 2019-05-17 DIAGNOSIS — E55.9 VITAMIN D DEFICIENCY: ICD-10-CM

## 2019-05-17 DIAGNOSIS — E78.5 DYSLIPIDEMIA: ICD-10-CM

## 2019-05-17 DIAGNOSIS — R97.20 INCREASED PROSTATE SPECIFIC ANTIGEN (PSA) VELOCITY: ICD-10-CM

## 2019-05-17 DIAGNOSIS — Z00.00 PE (PHYSICAL EXAM), ROUTINE: Primary | ICD-10-CM

## 2019-05-17 DIAGNOSIS — I10 ESSENTIAL HYPERTENSION: ICD-10-CM

## 2019-05-17 DIAGNOSIS — L57.0 ACTINIC KERATOSES: ICD-10-CM

## 2019-05-17 PROCEDURE — 93000 ELECTROCARDIOGRAM COMPLETE: CPT | Performed by: INTERNAL MEDICINE

## 2019-05-17 PROCEDURE — 99396 PREV VISIT EST AGE 40-64: CPT | Performed by: INTERNAL MEDICINE

## 2019-05-17 RX ORDER — SULFAMETHOXAZOLE AND TRIMETHOPRIM 800; 160 MG/1; MG/1
1 TABLET ORAL 2 TIMES DAILY
Qty: 28 TABLET | Refills: 0 | Status: SHIPPED | OUTPATIENT
Start: 2019-05-17 | End: 2019-05-31

## 2019-05-17 RX ORDER — METFORMIN HYDROCHLORIDE 500 MG/1
2000 TABLET, EXTENDED RELEASE ORAL
Qty: 360 TABLET | Refills: 3 | Status: SHIPPED | OUTPATIENT
Start: 2019-05-17 | End: 2020-05-27 | Stop reason: SDUPTHER

## 2019-05-17 RX ORDER — FENOFIBRIC ACID 135 MG/1
135 CAPSULE, DELAYED RELEASE ORAL DAILY
Qty: 90 CAPSULE | Refills: 3 | Status: SHIPPED | OUTPATIENT
Start: 2019-05-17 | End: 2020-05-27 | Stop reason: SDUPTHER

## 2019-05-17 RX ORDER — EPINEPHRINE 0.3 MG/.3ML
INJECTION SUBCUTANEOUS
Qty: 1 EACH | Refills: 1 | Status: SHIPPED | OUTPATIENT
Start: 2019-05-17 | End: 2022-06-07 | Stop reason: SDUPTHER

## 2019-05-17 NOTE — PROGRESS NOTES
"Chief Complaint   Patient presents with   • Annual Exam       History of Present Illness  58 y.o.  gentleman presents for updated phys examination.  Is not exercising but otherwise feels well.  Has a few spots on his face that he would like examined.    Review of Systems  Denies headaches, visual changes, CP, palpitations, SOB, cough, abd pain, n/v/d, difficulty with urination (denies hesitancy, dribbling, urgency), numbness/tingling, falls, mood changes, lightheadedness, hearing changes, rashes.     All other ROS reviewed and negative.    [facility]  The following portions of the patient's history were reviewed and updated as appropriate: allergies, current medications, past family history, past medical history, past social history, past surgical history and problem list.  FH: father - bladder CA; no known prostate CA hx    Current Outpatient Medications:   •  aspirin 81 MG tablet, QD  •  Cholecalciferol (VITAMIN D3) 5000 UNITS capsule 2 QD  •  EPINEPHrine (EPIPEN) 0.3 MG/0.3ML solution auto-injector injection prn  •  fenofibric acid (TRILIPIX) 135 MG capsule QD  •  INVOKANA 300 MG tablet, QD  •  metFORMIN ER (GLUCOPHAGE-XR) 500 MG 4 QD  •  VASCEPA 1 g capsule capsule, 2 BID  •  promethazine (PHENERGAN) 25 MG tablet, q8 prn    VITALS:  /78   Pulse 68   Ht 175.3 cm (69\")   Wt 92.1 kg (203 lb)   SpO2 100%   BMI 29.98 kg/m²     Physical Exam   Constitutional: He is oriented to person, place, and time. He appears well-developed and well-nourished.   HENT:   Head: Normocephalic.   Right Ear: External ear normal.   Left Ear: External ear normal.   Nose: Nose normal.   Mouth/Throat: Oropharynx is clear and moist and mucous membranes are normal. No oropharyngeal exudate.   Eyes: Conjunctivae and EOM are normal. Pupils are equal, round, and reactive to light.   Neck: Normal range of motion. Neck supple. Carotid bruit is not present (bilaterally). No thyromegaly present.   Cardiovascular: Normal rate, regular " rhythm and normal heart sounds.   Pulmonary/Chest: Effort normal and breath sounds normal. No respiratory distress. He has no wheezes. He has no rales.   Abdominal: Soft. Bowel sounds are normal. He exhibits no distension and no mass. There is no hepatosplenomegaly. There is no tenderness.   Genitourinary:   Genitourinary Comments: Chaperone declined by patient; normal external genitalia, no hemorrhoids, prostate smooth, nonenlarged, nontender, no nodules palpated; hard stool in rectal vault     Musculoskeletal: Normal range of motion. He exhibits no edema.   Lymphadenopathy:     He has no cervical adenopathy.   Neurological: He is alert and oriented to person, place, and time. He displays normal reflexes. No cranial nerve deficit.   Skin: Skin is warm and dry. No rash noted.   4mm tan macule right cheek; 3mm hyperkeratosis left temple   Psychiatric: He has a normal mood and affect. His behavior is normal.   Nursing note and vitals reviewed.      LABS  Results for orders placed or performed in visit on 05/10/19   Comprehensive Metabolic Panel   Result Value Ref Range    Glucose 125 (H) 65 - 99 mg/dL    BUN 13 6 - 20 mg/dL    Creatinine 1.05 0.76 - 1.27 mg/dL    Sodium 139 136 - 145 mmol/L    Potassium 4.5 3.5 - 5.2 mmol/L    Chloride 104 98 - 107 mmol/L    CO2 23.1 22.0 - 29.0 mmol/L    Calcium 10.2 8.6 - 10.5 mg/dL    Total Protein 7.0 6.0 - 8.5 g/dL    Albumin 4.60 3.50 - 5.20 g/dL    ALT (SGPT) 25 1 - 41 U/L    AST (SGOT) 24 1 - 40 U/L    Alkaline Phosphatase 33 (L) 39 - 117 U/L    Total Bilirubin 0.5 0.2 - 1.2 mg/dL    eGFR Non African Amer 73 >60 mL/min/1.73    Globulin 2.4 gm/dL    A/G Ratio 1.9 g/dL    BUN/Creatinine Ratio 12.4 7.0 - 25.0    Anion Gap 11.9 mmol/L   Lipid Panel   Result Value Ref Range    Total Cholesterol 186 0 - 200 mg/dL    Triglycerides 488 (H) 0 - 150 mg/dL    HDL Cholesterol 29 (L) 40 - 60 mg/dL    LDL Cholesterol   0 - 100 mg/dL    VLDL Cholesterol  5 - 40 mg/dL    LDL/HDL Ratio     TSH    Result Value Ref Range    TSH 3.520 0.270 - 4.200 mIU/mL   Microalbumin / Creatinine Urine Ratio - Urine, Clean Catch   Result Value Ref Range    Microalbumin/Creatinine Ratio  mg/g    Creatinine, Urine 73.7 mg/dL    Microalbumin, Urine <1.2 mg/L   Hemoglobin A1c   Result Value Ref Range    Hemoglobin A1C 6.56 (H) 4.80 - 5.60 %   PSA Screen   Result Value Ref Range    PSA 1.430 0.000 - 4.000 ng/mL   CK   Result Value Ref Range    Creatine Kinase 125 20 - 200 U/L   Vitamin D 25 Hydroxy   Result Value Ref Range    25 Hydroxy, Vitamin D 56.4 30.0 - 100.0 ng/ml   Vitamin B12   Result Value Ref Range    Vitamin B-12 259 211 - 946 pg/mL   CBC Auto Differential   Result Value Ref Range    WBC 5.34 3.40 - 10.80 10*3/mm3    RBC 5.57 4.14 - 5.80 10*6/mm3    Hemoglobin 15.9 13.0 - 17.7 g/dL    Hematocrit 49.2 37.5 - 51.0 %    MCV 88.3 79.0 - 97.0 fL    MCH 28.5 26.6 - 33.0 pg    MCHC 32.3 31.5 - 35.7 g/dL    RDW 13.8 12.3 - 15.4 %    RDW-SD 44.4 37.0 - 54.0 fl    MPV 10.8 6.0 - 12.0 fL    Platelets 210 140 - 450 10*3/mm3    Neutrophil % 37.7 (L) 42.7 - 76.0 %    Lymphocyte % 50.4 (H) 19.6 - 45.3 %    Monocyte % 8.1 5.0 - 12.0 %    Eosinophil % 2.1 0.3 - 6.2 %    Basophil % 1.3 0.0 - 1.5 %    Immature Grans % 0.4 0.0 - 0.5 %    Neutrophils, Absolute 2.02 1.70 - 7.00 10*3/mm3    Lymphocytes, Absolute 2.69 0.70 - 3.10 10*3/mm3    Monocytes, Absolute 0.43 0.10 - 0.90 10*3/mm3    Eosinophils, Absolute 0.11 0.00 - 0.40 10*3/mm3    Basophils, Absolute 0.07 0.00 - 0.20 10*3/mm3    Immature Grans, Absolute 0.02 0.00 - 0.05 10*3/mm3    nRBC 0.2 0.0 - 0.2 /100 WBC   Urinalysis without microscopic (no culture) - Urine, Clean Catch   Result Value Ref Range    Color, UA Yellow Yellow, Straw    Appearance, UA Clear Clear    pH, UA <=5.0 5.0 - 8.0    Specific Gravity, UA >1.030 (H) 1.005 - 1.030    Glucose, UA >=1000 mg/dL (3+) (A) Negative    Ketones, UA Negative Negative    Bilirubin, UA Negative Negative    Blood, UA Negative Negative     Protein, UA Negative Negative    Leuk Esterase, UA Negative Negative    Nitrite, UA Negative Negative    Urobilinogen, UA 0.2 E.U./dL 0.2 - 1.0 E.U./dL   Urinalysis, Microscopic Only - Urine, Clean Catch   Result Value Ref Range    RBC, UA 0-2 None Seen, 0-2 /HPF    WBC, UA 0-2 None Seen, 0-2 /HPF    Bacteria, UA None Seen None Seen /HPF    Squamous Epithelial Cells, UA 0-2 None Seen, 0-2 /HPF    Hyaline Casts, UA None Seen None Seen /LPF    Methodology Automated Microscopy    LDL Cholesterol, Direct   Result Value Ref Range    LDL Cholesterol  86 0 - 100 mg/dL     2/19 A1C 6.3    3/18 PSA 0.78      ECG 12 Lead  Date/Time: 5/17/2019 9:14 AM  Performed by: Violette Hylton MD  Authorized by: Violette Hylton MD   Comparison: compared with previous ECG from 4/16/2018  Similar to previous ECG  Rhythm: sinus rhythm  Rate: normal  BPM: 65  Conduction: conduction normal  ST Segments: ST segments normal  T Waves: T waves normal  T inversion: III  QRS axis: normal  Clinical impression comment: stable EKG            ASSESSMENT/PLAN  Problem List Items Addressed This Visit     Dyslipidemia     Bord lipids with persistently elevated  but LDL at goal (86); remains on trilipix 135mg QD#90, 3RF and vascepa 1gm 2 BID (RF'd 1 yr in 2/19); encouraged increased aerobic exercise to improve HDL         Relevant Medications    fenofibric acid (TRILIPIX) 135 MG capsule delayed-release delayed release capsule    Hypertension     BP and electrolytes remains stable; no meds         Relevant Medications    EPINEPHrine (EPIPEN) 0.3 MG/0.3ML solution auto-injector injection    Other Relevant Orders    ECG 12 Lead    Controlled type 2 diabetes mellitus with hyperglycemia, without long-term current use of insulin (CMS/[facility])     BG control bord with A1C 6.56; on met ER 500mg 4 QD #360, 3RF and invokana 300mg QD  (RF'd 1 yr in 5/19); encouraged reg phys activity to decr insulin resistance, moderation in unhealthy starches/sweets; f/u A1C in 6  mos           Relevant Medications    metFORMIN ER (GLUCOPHAGE-XR) 500 MG 24 hr tablet    Vitamin D deficiency     Resolved with level 56 on 10,000 units QD; ok to decr supplementation to 5000 units QD in the summer months         Actinic keratoses     Left temple lesion could be AK; follow clinically and if does not clear up, f/u with derm         PE (physical exam), routine - Primary     Health maintenance - flu vacc 10/18 ( rec annually); PVX 9/16; Tdap 7/11 (Td due 2021), Zostavax 11/13; Shingrix and HAV done; CASSANDRA performed today (nl) with increased PSA velocity; nl colonosc 6/15, repeat 2020 per Dr. Brandon; eye exam 4/19 with Dr. Keith (no DM retinopathy); dental exam pending next wk; (+) seat belt use         Increased prostate specific antigen (PSA) velocity     NEW with PSA 1.43, previously 0.43; reviewed implications, risks, and options; patient will proceed with bactrim DS BID x 14d and repeat PSA (free/total) in 1 month; if persists, then uro consultation         Relevant Medications    sulfamethoxazole-trimethoprim (BACTRIM DS,SEPTRA DS) 800-160 MG per tablet    Other Relevant Orders    PSA, Total & Free          FOLLOW-UP  1. Return in 1 month for f/u PSA (free/total) - escribed  2. RTC 6 mos with A1C    Electronically signed by:    Violette Hylton MD  05/17/2019

## 2019-05-17 NOTE — ASSESSMENT & PLAN NOTE
Bord lipids with persistently elevated  but LDL at goal (86); remains on trilipix 135mg QD#90, 3RF and vascepa 1gm 2 BID (RF'd 1 yr in 2/19); encouraged increased aerobic exercise to improve HDL

## 2019-05-17 NOTE — ASSESSMENT & PLAN NOTE
Health maintenance - flu vacc 10/18 ( rec annually); PVX 9/16; Tdap 7/11 (Td due 2021), Zostavax 11/13; Shingrix and HAV done; CASSANDRA performed today (nl) with increased PSA velocity; nl colonosc 6/15, repeat 2020 per Dr. Brandon; eye exam 4/19 with Dr. Keith (no DM retinopathy); dental exam pending next wk; (+) seat belt use

## 2019-05-17 NOTE — ASSESSMENT & PLAN NOTE
Resolved with level 56 on 10,000 units QD; ok to decr supplementation to 5000 units QD in the summer months

## 2019-05-17 NOTE — ASSESSMENT & PLAN NOTE
NEW with PSA 1.43, previously 0.43; reviewed implications, risks, and options; patient will proceed with bactrim DS BID x 14d and repeat PSA (free/total) in 1 month; if persists, then uro consultation

## 2019-05-17 NOTE — ASSESSMENT & PLAN NOTE
BG control bord with A1C 6.56; on met ER 500mg 4 QD #360, 3RF and invokana 300mg QD  (RF'd 1 yr in 5/19); encouraged reg phys activity to decr insulin resistance, moderation in unhealthy starches/sweets; f/u A1C in 6 mos

## 2019-06-29 ENCOUNTER — LAB (OUTPATIENT)
Dept: INTERNAL MEDICINE | Facility: CLINIC | Age: 59
End: 2019-06-29

## 2019-06-29 DIAGNOSIS — R97.20 INCREASED PROSTATE SPECIFIC ANTIGEN (PSA) VELOCITY: ICD-10-CM

## 2019-06-29 PROCEDURE — 84153 ASSAY OF PSA TOTAL: CPT | Performed by: INTERNAL MEDICINE

## 2019-06-29 PROCEDURE — 84154 ASSAY OF PSA FREE: CPT | Performed by: INTERNAL MEDICINE

## 2019-07-01 LAB
PSA FREE MFR SERPL: 42.6 %
PSA FREE SERPL-MCNC: 0.98 NG/ML
PSA SERPL-MCNC: 2.3 NG/ML (ref 0–4)

## 2019-07-03 ENCOUNTER — TELEPHONE (OUTPATIENT)
Dept: INTERNAL MEDICINE | Facility: CLINIC | Age: 59
End: 2019-07-03

## 2019-07-03 NOTE — TELEPHONE ENCOUNTER
Pt notified of PSA results. He does want to have a referral put in for urology but he wants to let his wife know first and make sure there isn't a specific urologist he needs to go to. He will call back to let us know if he has a preference.

## 2019-07-03 NOTE — TELEPHONE ENCOUNTER
----- Message from Violette Hylton MD sent at 7/2/2019 11:40 PM EDT -----  PSA continues to rise - the percentage of free PSA is low risk for prostate CA but still PSA increasing.  Recommend urology consultation.  OV if further questions

## 2019-07-03 NOTE — PROGRESS NOTES
PSA continues to rise - the percentage of free PSA is low risk for prostate CA but still PSA increasing.  Recommend urology consultation.  OV if further questions

## 2019-07-08 ENCOUNTER — OFFICE VISIT (OUTPATIENT)
Dept: INTERNAL MEDICINE | Facility: CLINIC | Age: 59
End: 2019-07-08

## 2019-07-08 VITALS
DIASTOLIC BLOOD PRESSURE: 68 MMHG | HEIGHT: 69 IN | WEIGHT: 199 LBS | SYSTOLIC BLOOD PRESSURE: 132 MMHG | OXYGEN SATURATION: 99 % | BODY MASS INDEX: 29.47 KG/M2 | TEMPERATURE: 98.5 F | HEART RATE: 87 BPM

## 2019-07-08 DIAGNOSIS — R05.9 COUGH: ICD-10-CM

## 2019-07-08 DIAGNOSIS — E11.65 TYPE 2 DIABETES MELLITUS WITH HYPERGLYCEMIA, WITHOUT LONG-TERM CURRENT USE OF INSULIN (HCC): ICD-10-CM

## 2019-07-08 DIAGNOSIS — R50.9 FEVER, UNSPECIFIED FEVER CAUSE: Primary | ICD-10-CM

## 2019-07-08 LAB
BILIRUB BLD-MCNC: NEGATIVE MG/DL
CLARITY, POC: CLEAR
COLOR UR: YELLOW
GLUCOSE BLDC GLUCOMTR-MCNC: 139 MG/DL (ref 70–130)
GLUCOSE UR STRIP-MCNC: ABNORMAL MG/DL
KETONES UR QL: NEGATIVE
LEUKOCYTE EST, POC: NEGATIVE
NITRITE UR-MCNC: NEGATIVE MG/ML
PH UR: 6 [PH] (ref 5–8)
PROT UR STRIP-MCNC: NEGATIVE MG/DL
RBC # UR STRIP: NEGATIVE /UL
SP GR UR: 1.01 (ref 1–1.03)
UROBILINOGEN UR QL: NORMAL

## 2019-07-08 PROCEDURE — 99213 OFFICE O/P EST LOW 20 MIN: CPT | Performed by: NURSE PRACTITIONER

## 2019-07-08 PROCEDURE — 81003 URINALYSIS AUTO W/O SCOPE: CPT | Performed by: NURSE PRACTITIONER

## 2019-07-08 PROCEDURE — 82962 GLUCOSE BLOOD TEST: CPT | Performed by: NURSE PRACTITIONER

## 2019-07-08 RX ORDER — AZITHROMYCIN 250 MG/1
TABLET, FILM COATED ORAL
Qty: 6 TABLET | Refills: 0 | OUTPATIENT
Start: 2019-07-08 | End: 2019-08-04

## 2019-07-08 RX ORDER — GUAIFENESIN 600 MG/1
1200 TABLET, EXTENDED RELEASE ORAL 2 TIMES DAILY PRN
Qty: 30 TABLET | Refills: 0 | Status: SHIPPED | OUTPATIENT
Start: 2019-07-08 | End: 2019-07-15

## 2019-07-08 NOTE — PROGRESS NOTES
Chief Complaint   Patient presents with   • URI   • Fever       History of Present Illness  58 y.o.male presents for URI fever.  Since thurs 5 days fever 101; better with advil but keeps coming back. Myalgias neck pain posterior and cough recurrent nonproductive. Neck muscles tense no headaches, vision changes or dizziness. No sinus pressure or sore throat.  positive some small amt rhinorrhea. No short of breath just feels like breathes faster with temperature. No abd pain.  Positive decreased appetite mild nausea no vomiting or diarrhea. BM ok. No rashes. No sick contacts. No travel or exposures no tick or insect bites no exposure to creek water or standing water. Cleaned old building Saturday with blower neida may have made worse.  Positive urinary frequency no dysuria or hematuria. Has appt with urology for elevated psa.  Blood sugar higher with illness 180-190. Hx diabetes type 2 taking meds as directed; needs new rx for blood glucose strips.       Review of Systems   Constitutional: Positive for appetite change, chills, fatigue and fever. Negative for diaphoresis, unexpected weight gain and unexpected weight loss.   HENT: Negative for congestion, dental problem, ear pain, mouth sores, postnasal drip, rhinorrhea, sinus pressure, sneezing, sore throat and voice change.    Eyes: Negative for blurred vision, double vision and visual disturbance.   Respiratory: Positive for cough. Negative for shortness of breath and wheezing.    Cardiovascular: Negative for chest pain, palpitations and leg swelling.   Gastrointestinal: Positive for nausea. Negative for abdominal pain, constipation, diarrhea and vomiting.   Genitourinary: Positive for frequency. Negative for difficulty urinating, dysuria, flank pain, hematuria and urgency.   Musculoskeletal: Positive for myalgias.   Skin: Negative for rash.   Neurological: Negative for dizziness, weakness, numbness and headache.   Hematological: Negative for adenopathy. Does not  bruise/bleed easily.         [facility]  The following portions of the patient's history were reviewed and updated as appropriate: allergies, current medications, past family history, past medical history, past social history, past surgical history and problem list.       Past Medical History:   Diagnosis Date   • Acute bronchitis with bronchospasm    • Bee sting 6/3/2016    Impression: 08/30/2014 - precautions discussed; has updated Epi Pen  Impression: 06/30/2014 - borderline allergic reaction but could be as extensive as it is due to the numerous stings he sustained; DXM 1cc IM x1 in the office; RXs for prednisone taper #36, 0RF and epi pen #1, 0RF; also rec OTC antihsitamine QD and zantac/pepcid BID, aveeno oatmeal baths, and avoidance of heat and hot showers;    • Diabetes mellitus (CMS/[facility])    • Hx of colonoscopy 06/15/2015    nl colonosc (6/15/15), repeat 5 yrs; [facility] - Dr. Brandon   • Hypertension    • Onychomycosis of toenail 6/3/2016    Impression: 04/21/2015 - discussed keeping toenail trimmed as he is doing; OK to cont current topical OTC therapy; OK to try teatree oil; no indication with mild nature to pursue oral antifungal;       Past Surgical History:   Procedure Laterality Date   • CARPAL TUNNEL RELEASE Right 10/2017    ortho - Dr. Bertrand   • TONSILLECTOMY  1970    age 10      Allergies   Allergen Reactions   • Triamterene Other (See Comments)     Hypercalcemia      Family History   Problem Relation Age of Onset   • Cancer Father         Bladder cancer   • Diverticulitis Father    • Hypertension Father    • Colon polyps Father    • Coronary artery disease Father         CABG   • Hyperlipidemia Father         hyperTG   • Diabetes Maternal Uncle    • Diabetes Maternal Grandmother             Current Outpatient Medications:   •  aspirin 81 MG tablet, Take 1 tablet by mouth daily., Disp: , Rfl:   •  Cholecalciferol (VITAMIN D3) 5000 UNITS capsule capsule, Take 5,000 Units by mouth Daily., Disp: , Rfl:   •   "EPINEPHrine (EPIPEN) 0.3 MG/0.3ML solution auto-injector injection, USE AS DIRECTED, Disp: 1 each, Rfl: 1  •  fenofibric acid (TRILIPIX) 135 MG capsule delayed-release delayed release capsule, Take 1 capsule by mouth Daily., Disp: 90 capsule, Rfl: 3  •  INVOKANA 300 MG tablet, TAKE 1 TABLET BY MOUTH ONCE DAILY, Disp: 90 tablet, Rfl: 3  •  metFORMIN ER (GLUCOPHAGE-XR) 500 MG 24 hr tablet, Take 4 tablets by mouth Daily With Breakfast., Disp: 360 tablet, Rfl: 3  •  Multiple Vitamins-Minerals (MULTIVITAMIN ADULT PO), Take  by mouth Daily., Disp: , Rfl:   •  promethazine (PHENERGAN) 25 MG tablet, Take 1 tablet by mouth Every 8 (Eight) Hours As Needed for Nausea or Vomiting., Disp: 5 tablet, Rfl: 0  •  VASCEPA 1 g capsule capsule, TAKE TWO CAPSULES BY MOUTH TWICE DAILY WITH MEALS, Disp: 360 capsule, Rfl: 3    VITALS:  /68   Pulse 87   Temp 98.5 °F (36.9 °C)   Ht 175.3 cm (69\")   Wt 90.3 kg (199 lb)   SpO2 99%   BMI 29.39 kg/m²     Physical Exam   Constitutional: He is oriented to person, place, and time. He appears well-developed and well-nourished. No distress.   HENT:   Head: Normocephalic.   Right Ear: Tympanic membrane, external ear and ear canal normal.   Left Ear: Tympanic membrane, external ear and ear canal normal.   Nose: Mucosal edema and rhinorrhea present. No sinus tenderness or congestion. Right sinus exhibits no maxillary sinus tenderness and no frontal sinus tenderness. Left sinus exhibits no maxillary sinus tenderness and no frontal sinus tenderness.   Mouth/Throat: Oropharynx is clear and moist and mucous membranes are normal. No posterior oropharyngeal edema or posterior oropharyngeal erythema.   White nasal secretions lower turbinate left nares   Eyes: Conjunctivae are normal. Pupils are equal, round, and reactive to light. Right eye exhibits no discharge. Left eye exhibits no discharge.   Neck: Trachea normal and normal range of motion. Neck supple. Muscular tenderness present. No spinous " process tenderness present. No neck rigidity. Normal range of motion present. No thyromegaly present.   Cardiovascular: Normal rate, regular rhythm and normal heart sounds.   Pulmonary/Chest: Effort normal and breath sounds normal. No respiratory distress.   Cough with few scattered rhonchi upper airway; clears with cough   Abdominal: Soft. Bowel sounds are normal. He exhibits no distension. There is no hepatosplenomegaly. There is no tenderness. There is no rigidity, no guarding, no CVA tenderness and negative Dunaway's sign.   Musculoskeletal: Normal range of motion.   Normal ROM all major joints   Lymphadenopathy:     He has no cervical adenopathy.   Neurological: He is alert and oriented to person, place, and time.   Skin: Skin is warm and dry. Capillary refill takes less than 2 seconds. No rash noted. He is not diaphoretic.   Psychiatric: He has a normal mood and affect. His behavior is normal.   Vitals reviewed.      LABS  No new labs    Chest xray pending.    ASSESSMENT/PLAN  Bradley was seen today for uri and fever.    Diagnoses and all orders for this visit:    Fever, unspecified fever cause  Comments:  advil prn.  Orders:  -     POC Urinalysis Dipstick, Automated  -     azithromycin (ZITHROMAX) 250 MG tablet; Take 2 tablets the first day, then 1 tablet daily for 4 days.  -     XR Chest 2 View; Future    Cough  -     azithromycin (ZITHROMAX) 250 MG tablet; Take 2 tablets the first day, then 1 tablet daily for 4 days.  -     guaiFENesin (MUCINEX) 600 MG 12 hr tablet; Take 2 tablets by mouth 2 (Two) Times a Day As Needed for Cough.  -     XR Chest 2 View; Future    Type 2 diabetes mellitus with hyperglycemia, without long-term current use of insulin (CMS/[facility])  Comments:  hx diabetes type 2 elevated more than usual with fever; last A1c controlled  Orders:  -     POC Glucose  -     glucose blood test strip; One touch ultra 2; one time per day as needed.    Will FU on xray.  Encouraged cough deep breathing; drink  plenty fluids.  If worsening of symptoms or no improvement in symptoms or fever > 101.5 patient should contact our office for further evaluation treatment or seek emergency care.    I discussed the patients findings and my recommendations with patient.  Patient was encouraged to keep me informed of any acute changes, lack of improvement, or any new concerning symptoms.    Patient voiced understanding of all instructions and denied further questions.      FOLLOW-UP  Return if symptoms worsen or fail to improve.    Electronically signed by:    AQUILES Pena  07/08/2019

## 2019-07-10 ENCOUNTER — HOSPITAL ENCOUNTER (OUTPATIENT)
Dept: GENERAL RADIOLOGY | Facility: HOSPITAL | Age: 59
Discharge: HOME | End: 2019-07-10
Admitting: NURSE PRACTITIONER

## 2019-07-10 DIAGNOSIS — R50.9 FEVER, UNSPECIFIED FEVER CAUSE: ICD-10-CM

## 2019-07-10 DIAGNOSIS — R05.9 COUGH: ICD-10-CM

## 2019-07-10 PROCEDURE — 71046 X-RAY EXAM CHEST 2 VIEWS: CPT

## 2019-07-13 ENCOUNTER — TELEPHONE (OUTPATIENT)
Dept: INTERNAL MEDICINE | Facility: CLINIC | Age: 59
End: 2019-07-13

## 2019-07-13 NOTE — TELEPHONE ENCOUNTER
Called pt and he voiced understanding, no fever finishes medicine and states he feels better will come in to see dr boucher

## 2019-07-13 NOTE — TELEPHONE ENCOUNTER
----- Message from AQUILES Hutchinson sent at 7/12/2019  2:39 PM EDT -----  Let pt know his xray did show some possible early pneumonia on Left upper lobe posterior. Making sure he took his abx ok. Is he feeling better?L any fever?  Need to schedule a FU appt with pcp next week to make sure cleared ok.

## 2019-08-04 ENCOUNTER — HOSPITAL ENCOUNTER (EMERGENCY)
Facility: HOSPITAL | Age: 59
Discharge: HOME | End: 2019-08-04
Attending: EMERGENCY MEDICINE | Admitting: EMERGENCY MEDICINE

## 2019-08-04 VITALS
WEIGHT: 200 LBS | OXYGEN SATURATION: 96 % | HEIGHT: 69 IN | BODY MASS INDEX: 29.62 KG/M2 | HEART RATE: 96 BPM | TEMPERATURE: 98.3 F | DIASTOLIC BLOOD PRESSURE: 77 MMHG | RESPIRATION RATE: 18 BRPM | SYSTOLIC BLOOD PRESSURE: 120 MMHG

## 2019-08-04 DIAGNOSIS — T63.481A ANAPHYLAXIS DUE TO HYMENOPTERA VENOM, ACCIDENTAL OR UNINTENTIONAL, INITIAL ENCOUNTER: Primary | ICD-10-CM

## 2019-08-04 DIAGNOSIS — T78.2XXA ANAPHYLAXIS DUE TO HYMENOPTERA VENOM, ACCIDENTAL OR UNINTENTIONAL, INITIAL ENCOUNTER: Primary | ICD-10-CM

## 2019-08-04 PROCEDURE — 25010000002 METHYLPREDNISOLONE PER 40 MG: Performed by: EMERGENCY MEDICINE

## 2019-08-04 PROCEDURE — 99284 EMERGENCY DEPT VISIT MOD MDM: CPT

## 2019-08-04 PROCEDURE — 96374 THER/PROPH/DIAG INJ IV PUSH: CPT

## 2019-08-04 PROCEDURE — 96375 TX/PRO/DX INJ NEW DRUG ADDON: CPT

## 2019-08-04 PROCEDURE — 25010000002 DIPHENHYDRAMINE PER 50 MG: Performed by: EMERGENCY MEDICINE

## 2019-08-04 RX ORDER — DIPHENHYDRAMINE HYDROCHLORIDE 50 MG/ML
25 INJECTION INTRAMUSCULAR; INTRAVENOUS ONCE
Status: COMPLETED | OUTPATIENT
Start: 2019-08-04 | End: 2019-08-04

## 2019-08-04 RX ORDER — PREDNISONE 20 MG/1
20 TABLET ORAL 2 TIMES DAILY
Qty: 10 TABLET | Refills: 0 | Status: SHIPPED | OUTPATIENT
Start: 2019-08-04 | End: 2019-11-22

## 2019-08-04 RX ORDER — FAMOTIDINE 10 MG/ML
20 INJECTION, SOLUTION INTRAVENOUS ONCE
Status: COMPLETED | OUTPATIENT
Start: 2019-08-04 | End: 2019-08-04

## 2019-08-04 RX ORDER — METHYLPREDNISOLONE SODIUM SUCCINATE 40 MG/ML
80 INJECTION, POWDER, LYOPHILIZED, FOR SOLUTION INTRAMUSCULAR; INTRAVENOUS ONCE
Status: COMPLETED | OUTPATIENT
Start: 2019-08-04 | End: 2019-08-04

## 2019-08-04 RX ADMIN — METHYLPREDNISOLONE SODIUM SUCCINATE 80 MG: 40 INJECTION, POWDER, FOR SOLUTION INTRAMUSCULAR; INTRAVENOUS at 18:44

## 2019-08-04 RX ADMIN — FAMOTIDINE 20 MG: 10 INJECTION, SOLUTION INTRAVENOUS at 18:43

## 2019-08-04 RX ADMIN — DIPHENHYDRAMINE HYDROCHLORIDE 25 MG: 50 INJECTION INTRAMUSCULAR; INTRAVENOUS at 18:42

## 2019-08-04 NOTE — ED PROVIDER NOTES
Subjective   Mr. Ortiz is a 59 y/o male, , who got stung on his right hand through some new gloves a little more than one hour before arrival here.  Soon afterwards he started having some sensation of tingling in his body that went into his throat.  He went home and took and EpiPen shot.  About five minutes later he passed out.  Witnesses say he was pale and sweaty, but was out only about one minute.  After coming around he vomited.  Here he is no longer having throat tingling but does feel a bit dizzy.  No chest tightness.  No diarrhea.  Mild upper abdomen pain.  No rash.  No prior adverse reaction to bee stings but this time he had several stings and previously only a single sting.  PMH hypertension, DM.        History provided by:  Patient  Allergic Reaction   Presenting symptoms: no difficulty breathing, no difficulty swallowing, no rash and no wheezing    Relieved by:  Epinephrine      Review of Systems   Constitutional: Negative.    HENT: Negative.  Negative for ear pain, facial swelling and trouble swallowing.    Respiratory: Negative.  Negative for chest tightness, shortness of breath and wheezing.    Cardiovascular: Negative.  Negative for chest pain.   Gastrointestinal: Positive for abdominal pain, nausea and vomiting. Negative for diarrhea.   Skin: Negative for rash.   Neurological: Negative.    Psychiatric/Behavioral: Negative.    All other systems reviewed and are negative.      Past Medical History:   Diagnosis Date   • Acute bronchitis with bronchospasm    • Bee sting 6/3/2016    Impression: 08/30/2014 - precautions discussed; has updated Epi Pen  Impression: 06/30/2014 - borderline allergic reaction but could be as extensive as it is due to the numerous stings he sustained; DXM 1cc IM x1 in the office; RXs for prednisone taper #36, 0RF and epi pen #1, 0RF; also rec OTC antihsitamine QD and zantac/pepcid BID, aveeno oatmeal baths, and avoidance of heat and hot showers;    • Diabetes mellitus  (CMS/[facility])    • Hx of colonoscopy 06/15/2015    nl colonosc (6/15/15), repeat 5 yrs; [facility] - Dr. Brandon   • Hypertension    • Onychomycosis of toenail 6/3/2016    Impression: 04/21/2015 - discussed keeping toenail trimmed as he is doing; OK to cont current topical OTC therapy; OK to try teatree oil; no indication with mild nature to pursue oral antifungal;        Allergies   Allergen Reactions   • Triamterene Other (See Comments)     Hypercalcemia       Past Surgical History:   Procedure Laterality Date   • CARPAL TUNNEL RELEASE Right 10/2017    ortho - Dr. Bertrand   • TONSILLECTOMY  1970    age 10       Family History   Problem Relation Age of Onset   • Cancer Father         Bladder cancer   • Diverticulitis Father    • Hypertension Father    • Colon polyps Father    • Coronary artery disease Father         CABG   • Hyperlipidemia Father         hyperTG   • Diabetes Maternal Uncle    • Diabetes Maternal Grandmother        Social History     Socioeconomic History   • Marital status:      Spouse name: Not on file   • Number of children: 2   • Years of education: Not on file   • Highest education level: Not on file   Occupational History   • Occupation:    Tobacco Use   • Smoking status: Never Smoker   • Smokeless tobacco: Never Used   Substance and Sexual Activity   • Alcohol use: No     Frequency: Never   • Drug use: No   • Sexual activity: Yes     Partners: Female   Social History Narrative    Younger brother           Objective   Physical Exam   Constitutional: He is oriented to person, place, and time. He appears well-developed and well-nourished. No distress.   Intelligent, somewhat shaken male, looks to be hyperventilating a bit.   HENT:   Head: Atraumatic.   Mouth/Throat: Oropharynx is clear and moist.   Airway patent  Normal phonation   Eyes: Conjunctivae are normal.   Neck: Phonation normal. Neck supple.   Cardiovascular: Normal rate, regular rhythm and normal heart sounds.    Pulmonary/Chest: Effort normal and breath sounds normal. No respiratory distress.   Abdominal: Soft. There is tenderness (epigastrium).   Musculoskeletal: He exhibits no edema.   Neurological: He is alert and oriented to person, place, and time.   Skin: Skin is warm and dry. No rash noted.   A little swollen and reddened dorsum of right hand.   Psychiatric: He has a normal mood and affect. His behavior is normal.   Nursing note and vitals reviewed.      Procedures           ED Course  ED Course as of Aug 04 2319   Sun Aug 04, 2019   2241 He is feeling fine, any allergy type symptoms are now gone.  He still has mild swelling and some soreness in his stung right hand.  [LI]   2244 He still has an EpiPen at home.  [LI]      ED Course User Index  [LI] Samson Hunter MD                  [facility]      Final diagnoses:   Anaphylaxis due to hymenoptera venom, accidental or unintentional, initial encounter            Samson Hunter MD  08/04/19 2550

## 2019-08-05 NOTE — DISCHARGE INSTRUCTIONS
If you are having any allergic symptoms tomorrow, fill the prednisone and take it for five days.  You can use Benadryl, 50 mg every four hours and Pepcid, 20 mg twice daily along with the prednisone.

## 2019-09-08 PROBLEM — N40.0 BENIGN PROSTATIC HYPERPLASIA WITHOUT LOWER URINARY TRACT SYMPTOMS: Status: ACTIVE | Noted: 2019-09-08

## 2019-11-22 ENCOUNTER — OFFICE VISIT (OUTPATIENT)
Dept: INTERNAL MEDICINE | Facility: CLINIC | Age: 59
End: 2019-11-22

## 2019-11-22 VITALS
HEIGHT: 69 IN | WEIGHT: 202 LBS | SYSTOLIC BLOOD PRESSURE: 140 MMHG | HEART RATE: 73 BPM | OXYGEN SATURATION: 98 % | BODY MASS INDEX: 29.92 KG/M2 | DIASTOLIC BLOOD PRESSURE: 80 MMHG

## 2019-11-22 DIAGNOSIS — E11.65 TYPE 2 DIABETES MELLITUS WITH HYPERGLYCEMIA, WITHOUT LONG-TERM CURRENT USE OF INSULIN (HCC): Primary | ICD-10-CM

## 2019-11-22 DIAGNOSIS — E66.3 OVERWEIGHT (BMI 25.0-29.9): ICD-10-CM

## 2019-11-22 DIAGNOSIS — I10 ESSENTIAL HYPERTENSION: ICD-10-CM

## 2019-11-22 DIAGNOSIS — J06.9 VIRAL UPPER RESPIRATORY TRACT INFECTION: ICD-10-CM

## 2019-11-22 LAB — HBA1C MFR BLD: 6.8 %

## 2019-11-22 PROCEDURE — 83036 HEMOGLOBIN GLYCOSYLATED A1C: CPT | Performed by: INTERNAL MEDICINE

## 2019-11-22 PROCEDURE — 99214 OFFICE O/P EST MOD 30 MIN: CPT | Performed by: INTERNAL MEDICINE

## 2019-11-22 NOTE — PROGRESS NOTES
"Chief Complaint   Patient presents with   • Diabetes   • sinus congestion       History of Present Illness  59 y.o.  gentleman presents for diabetes follow-up.  Has not been checking blood sugars.  Also has not been taking any blood pressures.  Reports of head congestion starting Sunday, 5 days ago.  Went to the work clinic on Wednesday because of fever on Tuesday.  Also had some sick contacts with family member staying in the basement who had flu.  His flu test was negative.  Has been utilizing Xyzal, generic Flonase, and generic mucus relief.  Still has some nasal congestion and runny nose.  Soreness in the neck lymph nodes has improved.  Does not have any fevers.    Has not been exercising.  Planning on retiring sometime next year, time unknown at this time.    Review of Systems  ROS (+) for sinus congestion with runny nose, decreased sore throat, no fevers or chills.  Denies headaches, ear symptoms, chest pain, palpitations, shortness of breath.  Has decreased his exercise.  All other ROS reviewed and negative.    [facility]  The following portions of the patient's history were reviewed and updated as appropriate: allergies, current medications, past family history, past medical history, past social history, past surgical history and problem list.    Current Outpatient Medications:   •  aspirin 81 MG QD  •  Cholecalciferol (VITAMIN D3) 5000 UNITS capsule QD  •  EPINEPHrine (EPIPEN) 0.3 MG/0.3ML solution auto-injector injection, USE AS DIRECTED  •  fenofibric acid (TRILIPIX) 135 MG capsule QD  •  guaiFENesin (MUCINEX) 600 MG 12 hr prn  •  INVOKANA 300 MG tablet, QD  •  metFORMIN ER (GLUCOPHAGE-XR) 500 MG 4 QD  •  Multiple Vitamins-Minerals (MULTIVITAMIN ADULT PO), QD  •  promethazine (PHENERGAN) 25 MG tablet, prn  •  VASCEPA 1 g capsule 2 BID      VITALS:  /80   Pulse 73   Ht 175.3 cm (69\")   Wt 91.6 kg (202 lb)   SpO2 98%   BMI 29.83 kg/m²   Repeat BP left arm 152/80    Physical Exam "   Constitutional: He is oriented to person, place, and time. He appears well-developed and well-nourished.   Stable weight   HENT:   Nasal mucosa mod swollen bilaterally without erythema     Eyes: Conjunctivae and EOM are normal.   Cardiovascular: Normal rate, regular rhythm and normal heart sounds.   Pulmonary/Chest: Effort normal and breath sounds normal. No respiratory distress. He has no wheezes. He has no rales.   Abdominal: Soft. Bowel sounds are normal.   Musculoskeletal:   Normal gait    Bradley had a diabetic foot exam performed today.  Vascular Status -  His right foot exhibits no edema. His left foot exhibits no edema.  Skin Integrity  -  His right foot skin is intact.His left foot skin is intact..  Neurological: He is alert and oriented to person, place, and time.   Psychiatric: He has a normal mood and affect. His behavior is normal.   Nursing note and vitals reviewed.      LABS  Results for orders placed or performed in visit on 11/22/19   POC Glycosylated Hemoglobin (Hb A1C)   Result Value Ref Range    Hemoglobin A1C 6.8 %     5/19 A1c 6.56    ASSESSMENT/PLAN  Problem List Items Addressed This Visit     Type 2 diabetes mellitus with hyperglycemia, without long-term current use of insulin (CMS/[facility]) - Primary     BG control worsened with A1C 6.8; on met ER 500mg 4 QD and invokana 300mg QD; strongly encouraged resuming reg phys activity to decr insulin resistance, cont moderation in unhealthy starches/sweets; f/u A1C in 3 mos - plan to add DM med at that time if not improved           Relevant Orders    POC Glycosylated Hemoglobin (Hb A1C) (Completed)    Overweight (BMI 25.0-29.9)     Strongly encouraged aerobic exercise program; short-term goal to maintain < 200 lbs         Hypertension     Repeat BP worsened; on no meds currently; rec low Na diet, increased aerobic exercise; home BP monitoring with goal BP < 130/80; f/u in 3 mos at the latest - plan to start antihypertensive at that time if consistently  > 140/90             Other Visit Diagnoses     Viral upper respiratory tract infection        reviewed how to use flonase correctly; rec add netipot; cont xyzal, can use afrin up to 3d for nasal congestion; f/u prn          FOLLOW-UP  1. Health maintenance - flu vacc 9/19  2. RTC 3 mos with A1C and BP check    Electronically signed by:    Violette Hylton MD, FACP  11/22/2019    EMR Dragon/Transcription disclaimer:  Parts of this encounter note is an electronic transcription/translation of spoken language to printed text. Electronic translation of spoken language may permit erroneous, or at times, nonsensical words or phrases, to be inadvertently transcribed. Although I have reviewed the note for such errors, some may still exist.

## 2019-11-22 NOTE — ASSESSMENT & PLAN NOTE
BG control worsened with A1C 6.8; on met ER 500mg 4 QD and invokana 300mg QD; strongly encouraged resuming reg phys activity to decr insulin resistance, cont moderation in unhealthy starches/sweets; f/u A1C in 3 mos - plan to add DM med at that time if not improved

## 2019-11-22 NOTE — ASSESSMENT & PLAN NOTE
Repeat BP worsened; on no meds currently; rec low Na diet, increased aerobic exercise; home BP monitoring with goal BP < 130/80; f/u in 3 mos at the latest - plan to start antihypertensive at that time if consistently > 140/90

## 2020-02-19 ENCOUNTER — TELEPHONE (OUTPATIENT)
Dept: INTERNAL MEDICINE | Facility: CLINIC | Age: 60
End: 2020-02-19

## 2020-02-21 ENCOUNTER — OFFICE VISIT (OUTPATIENT)
Dept: INTERNAL MEDICINE | Facility: CLINIC | Age: 60
End: 2020-02-21

## 2020-02-21 VITALS
HEART RATE: 67 BPM | OXYGEN SATURATION: 98 % | HEIGHT: 69 IN | WEIGHT: 195 LBS | SYSTOLIC BLOOD PRESSURE: 124 MMHG | BODY MASS INDEX: 28.88 KG/M2 | DIASTOLIC BLOOD PRESSURE: 76 MMHG

## 2020-02-21 DIAGNOSIS — R10.13 EPIGASTRIC ABDOMINAL PAIN: ICD-10-CM

## 2020-02-21 DIAGNOSIS — E11.65 TYPE 2 DIABETES MELLITUS WITH HYPERGLYCEMIA, WITHOUT LONG-TERM CURRENT USE OF INSULIN (HCC): Primary | ICD-10-CM

## 2020-02-21 DIAGNOSIS — I10 ESSENTIAL HYPERTENSION: ICD-10-CM

## 2020-02-21 LAB — HBA1C MFR BLD: 6.6 %

## 2020-02-21 PROCEDURE — 99214 OFFICE O/P EST MOD 30 MIN: CPT | Performed by: INTERNAL MEDICINE

## 2020-02-21 PROCEDURE — 90471 IMMUNIZATION ADMIN: CPT | Performed by: INTERNAL MEDICINE

## 2020-02-21 PROCEDURE — 83036 HEMOGLOBIN GLYCOSYLATED A1C: CPT | Performed by: INTERNAL MEDICINE

## 2020-02-21 PROCEDURE — 90714 TD VACC NO PRESV 7 YRS+ IM: CPT | Performed by: INTERNAL MEDICINE

## 2020-02-21 NOTE — ASSESSMENT & PLAN NOTE
BG control improved with A1C 6.6 (was 6.8 in 11/19); on invokana 300mg QD and met ER 500mg 4 QD; encouraged reg phys activity to decr insulin resistance, moderation in unhealthy starches/sweets; f/u A1C in 3 mos

## 2020-02-21 NOTE — PROGRESS NOTES
"Chief Complaint   Patient presents with   • Diabetes       History of Present Illness  59 y.o.  gentleman presents for DM follow-up. Has not been checking BGs.  Thinks he has lost weight but just watching what he eats; not consistently exercising.     Reports upper abd pain, maybe hernia a few months ago.  Unsure but thinks it was worsened with lying down, better with getting up. Can't really remember details since it did go away and has not been recurrent.  No assoc'd n/v, just tend to touch.     Review of Systems  Denies CP, palpitations, SOB, vision changes, falls.  ROS (+) for self-limited episode of epigastric abd pain, maybe with protuberance with lying down, now resolved and has not recurred. All other ROS reviewed and negative.    [facility]  The following portions of the patient's history were reviewed and updated as appropriate: allergies, current medications, past family history, past medical history, past social history, past surgical history and problem list.      Current Outpatient Medications:   •  aspirin 81 MG QD  •  Cholecalciferol (VITAMIN D3) 5000 UNITS capsule QD  •  EPINEPHrine (EPIPEN) 0.3 MG/0.3ML solution auto-injector injection prn  •  fenofibric acid (TRILIPIX) 135 MG QD  •  INVOKANA 300 MG QD  •  metFORMIN ER (GLUCOPHAGE-XR) 500 MG 4 QD  •  Multiple Vitamins-Minerals (MULTIVITAMIN ADULT PO), QD  •  promethazine (PHENERGAN) 25 MG q8 prn  •  VASCEPA 1 g capsule 2 bid prn  •  guaiFENesin (MUCINEX) 600 MG 2 bid prn      VITALS:  /76   Pulse 67   Ht 175.3 cm (69\")   Wt 88.5 kg (195 lb)   SpO2 98%   BMI 28.80 kg/m²     Physical Exam   Constitutional: He is oriented to person, place, and time. He appears well-developed and well-nourished. No distress.   Down 7 lbs over last 3 mos   Eyes: Conjunctivae and EOM are normal.   Cardiovascular: Normal rate, regular rhythm and normal heart sounds.   Pulmonary/Chest: Effort normal and breath sounds normal. No respiratory distress. "   Abdominal: Soft. Bowel sounds are normal. He exhibits no distension. There is no tenderness. No hernia.   Neurological: He is alert and oriented to person, place, and time.   Skin: Skin is warm and dry.   Psychiatric: He has a normal mood and affect. His behavior is normal.   Nursing note and vitals reviewed.      LABS  Results for orders placed or performed in visit on 02/21/20   POC Glycosylated Hemoglobin (Hb A1C)   Result Value Ref Range    Hemoglobin A1C 6.6 %     11/19 A1C 6.8    ASSESSMENT/PLAN  Problem List Items Addressed This Visit     Type 2 diabetes mellitus with hyperglycemia, without long-term current use of insulin (CMS/[facility]) - Primary     BG control improved with A1C 6.6 (was 6.8 in 11/19); on invokana 300mg QD and met ER 500mg 4 QD; encouraged reg phys activity to decr insulin resistance, moderation in unhealthy starches/sweets; f/u A1C in 3 mos           Relevant Orders    POC Glycosylated Hemoglobin (Hb A1C) (Completed)    Hypertension     BP bord/stable; no meds; goal < 130/80           Other Visit Diagnoses     Epigastric abdominal pain        resolved; discussed physiology of hernia, usually decr'd with lying down, worsened with straining; f/u prn if recurs          FOLLOW-UP  1. Health maintenance - flu vacc 10/19; Td given today (Tdap 7/11)  2. RTC for PE 5/29/20; fasting labs prior to appt (CBC, CMP, TSH, lipids, UA/micr, microalb, A1C, PSA, CPK, vit D, B12)    Electronically signed by:    Violette Hylton MD, FACP  02/21/2020

## 2020-04-11 DIAGNOSIS — E11.9 CONTROLLED TYPE 2 DIABETES MELLITUS WITHOUT COMPLICATION, WITHOUT LONG-TERM CURRENT USE OF INSULIN (HCC): ICD-10-CM

## 2020-04-11 DIAGNOSIS — E78.5 DYSLIPIDEMIA: ICD-10-CM

## 2020-04-13 RX ORDER — CANAGLIFLOZIN 300 MG/1
TABLET, FILM COATED ORAL
Qty: 90 TABLET | Refills: 0 | Status: SHIPPED | OUTPATIENT
Start: 2020-04-13 | End: 2020-05-27 | Stop reason: SDUPTHER

## 2020-04-13 RX ORDER — ICOSAPENT ETHYL 1000 MG/1
CAPSULE ORAL
Qty: 360 CAPSULE | Refills: 0 | Status: SHIPPED | OUTPATIENT
Start: 2020-04-13 | End: 2020-05-27 | Stop reason: SDUPTHER

## 2020-05-21 ENCOUNTER — TELEPHONE (OUTPATIENT)
Dept: INTERNAL MEDICINE | Facility: CLINIC | Age: 60
End: 2020-05-21

## 2020-05-21 DIAGNOSIS — E78.5 DYSLIPIDEMIA: ICD-10-CM

## 2020-05-21 DIAGNOSIS — Z00.00 PE (PHYSICAL EXAM), ROUTINE: ICD-10-CM

## 2020-05-21 DIAGNOSIS — E11.65 TYPE 2 DIABETES MELLITUS WITH HYPERGLYCEMIA, WITHOUT LONG-TERM CURRENT USE OF INSULIN (HCC): Primary | ICD-10-CM

## 2020-05-21 DIAGNOSIS — R97.20 INCREASED PROSTATE SPECIFIC ANTIGEN (PSA) VELOCITY: ICD-10-CM

## 2020-05-21 DIAGNOSIS — E55.9 VITAMIN D DEFICIENCY: ICD-10-CM

## 2020-05-26 ENCOUNTER — LAB (OUTPATIENT)
Dept: LAB | Facility: HOSPITAL | Age: 60
End: 2020-05-26

## 2020-05-26 DIAGNOSIS — E78.5 DYSLIPIDEMIA: ICD-10-CM

## 2020-05-26 DIAGNOSIS — Z00.00 PE (PHYSICAL EXAM), ROUTINE: ICD-10-CM

## 2020-05-26 DIAGNOSIS — E11.65 TYPE 2 DIABETES MELLITUS WITH HYPERGLYCEMIA, WITHOUT LONG-TERM CURRENT USE OF INSULIN (HCC): ICD-10-CM

## 2020-05-26 DIAGNOSIS — R97.20 INCREASED PROSTATE SPECIFIC ANTIGEN (PSA) VELOCITY: ICD-10-CM

## 2020-05-26 DIAGNOSIS — E55.9 VITAMIN D DEFICIENCY: ICD-10-CM

## 2020-05-26 LAB
25(OH)D3 SERPL-MCNC: 51.4 NG/ML (ref 30–100)
ALBUMIN SERPL-MCNC: 4.7 G/DL (ref 3.5–5.2)
ALBUMIN UR-MCNC: <1.2 MG/DL
ALBUMIN/GLOB SERPL: 1.8 G/DL
ALP SERPL-CCNC: 35 U/L (ref 39–117)
ALT SERPL W P-5'-P-CCNC: 21 U/L (ref 1–41)
ANION GAP SERPL CALCULATED.3IONS-SCNC: 12.3 MMOL/L (ref 5–15)
AST SERPL-CCNC: 24 U/L (ref 1–40)
BACTERIA UR QL AUTO: NORMAL /HPF
BASOPHILS # BLD AUTO: 0.07 10*3/MM3 (ref 0–0.2)
BASOPHILS NFR BLD AUTO: 0.9 % (ref 0–1.5)
BILIRUB SERPL-MCNC: 0.3 MG/DL (ref 0.2–1.2)
BILIRUB UR QL STRIP: NEGATIVE
BUN BLD-MCNC: 21 MG/DL (ref 6–20)
BUN/CREAT SERPL: 17.5 (ref 7–25)
CALCIUM SPEC-SCNC: 10.5 MG/DL (ref 8.6–10.5)
CHLORIDE SERPL-SCNC: 104 MMOL/L (ref 98–107)
CHOLEST SERPL-MCNC: 163 MG/DL (ref 0–200)
CK SERPL-CCNC: 209 U/L (ref 20–200)
CLARITY UR: CLEAR
CO2 SERPL-SCNC: 21.7 MMOL/L (ref 22–29)
COLOR UR: YELLOW
CREAT BLD-MCNC: 1.2 MG/DL (ref 0.76–1.27)
CREAT UR-MCNC: 72 MG/DL
DEPRECATED RDW RBC AUTO: 43.3 FL (ref 37–54)
EOSINOPHIL # BLD AUTO: 0.12 10*3/MM3 (ref 0–0.4)
EOSINOPHIL NFR BLD AUTO: 1.5 % (ref 0.3–6.2)
ERYTHROCYTE [DISTWIDTH] IN BLOOD BY AUTOMATED COUNT: 14.3 % (ref 12.3–15.4)
GFR SERPL CREATININE-BSD FRML MDRD: 62 ML/MIN/1.73
GLOBULIN UR ELPH-MCNC: 2.6 GM/DL
GLUCOSE BLD-MCNC: 162 MG/DL (ref 65–99)
GLUCOSE UR STRIP-MCNC: ABNORMAL MG/DL
HBA1C MFR BLD: 6.93 % (ref 4.8–5.6)
HCT VFR BLD AUTO: 47.3 % (ref 37.5–51)
HDLC SERPL-MCNC: 29 MG/DL (ref 40–60)
HGB BLD-MCNC: 16.2 G/DL (ref 13–17.7)
HGB UR QL STRIP.AUTO: NEGATIVE
HYALINE CASTS UR QL AUTO: NORMAL /LPF
IMM GRANULOCYTES # BLD AUTO: 0.02 10*3/MM3 (ref 0–0.05)
IMM GRANULOCYTES NFR BLD AUTO: 0.3 % (ref 0–0.5)
KETONES UR QL STRIP: NEGATIVE
LDLC SERPL CALC-MCNC: 71 MG/DL (ref 0–100)
LDLC/HDLC SERPL: 2.44 {RATIO}
LEUKOCYTE ESTERASE UR QL STRIP.AUTO: NEGATIVE
LYMPHOCYTES # BLD AUTO: 4.03 10*3/MM3 (ref 0.7–3.1)
LYMPHOCYTES NFR BLD AUTO: 51.9 % (ref 19.6–45.3)
MCH RBC QN AUTO: 28.7 PG (ref 26.6–33)
MCHC RBC AUTO-ENTMCNC: 34.2 G/DL (ref 31.5–35.7)
MCV RBC AUTO: 83.7 FL (ref 79–97)
MICROALBUMIN/CREAT UR: NORMAL MG/G{CREAT}
MONOCYTES # BLD AUTO: 0.5 10*3/MM3 (ref 0.1–0.9)
MONOCYTES NFR BLD AUTO: 6.4 % (ref 5–12)
NEUTROPHILS # BLD AUTO: 3.02 10*3/MM3 (ref 1.7–7)
NEUTROPHILS NFR BLD AUTO: 39 % (ref 42.7–76)
NITRITE UR QL STRIP: NEGATIVE
NRBC BLD AUTO-RTO: 0 /100 WBC (ref 0–0.2)
PH UR STRIP.AUTO: 5.5 [PH] (ref 5–8)
PLATELET # BLD AUTO: 230 10*3/MM3 (ref 140–450)
PMV BLD AUTO: 10.8 FL (ref 6–12)
POTASSIUM BLD-SCNC: 4.3 MMOL/L (ref 3.5–5.2)
PROT SERPL-MCNC: 7.3 G/DL (ref 6–8.5)
PROT UR QL STRIP: NEGATIVE
RBC # BLD AUTO: 5.65 10*6/MM3 (ref 4.14–5.8)
RBC # UR: NORMAL /HPF
REF LAB TEST METHOD: NORMAL
SODIUM BLD-SCNC: 138 MMOL/L (ref 136–145)
SP GR UR STRIP: >=1.03 (ref 1–1.03)
SQUAMOUS #/AREA URNS HPF: NORMAL /HPF
TRIGL SERPL-MCNC: 316 MG/DL (ref 0–150)
TSH SERPL DL<=0.05 MIU/L-ACNC: 5.04 UIU/ML (ref 0.27–4.2)
UROBILINOGEN UR QL STRIP: ABNORMAL
VIT B12 BLD-MCNC: 432 PG/ML (ref 211–946)
VLDLC SERPL-MCNC: 63.2 MG/DL (ref 5–40)
WBC NRBC COR # BLD: 7.76 10*3/MM3 (ref 3.4–10.8)
WBC UR QL AUTO: NORMAL /HPF

## 2020-05-26 PROCEDURE — 83036 HEMOGLOBIN GLYCOSYLATED A1C: CPT | Performed by: INTERNAL MEDICINE

## 2020-05-26 PROCEDURE — 82043 UR ALBUMIN QUANTITATIVE: CPT | Performed by: INTERNAL MEDICINE

## 2020-05-26 PROCEDURE — 84153 ASSAY OF PSA TOTAL: CPT | Performed by: INTERNAL MEDICINE

## 2020-05-26 PROCEDURE — 85025 COMPLETE CBC W/AUTO DIFF WBC: CPT | Performed by: INTERNAL MEDICINE

## 2020-05-26 PROCEDURE — 82607 VITAMIN B-12: CPT | Performed by: INTERNAL MEDICINE

## 2020-05-26 PROCEDURE — 80053 COMPREHEN METABOLIC PANEL: CPT | Performed by: INTERNAL MEDICINE

## 2020-05-26 PROCEDURE — 84443 ASSAY THYROID STIM HORMONE: CPT | Performed by: INTERNAL MEDICINE

## 2020-05-26 PROCEDURE — 82570 ASSAY OF URINE CREATININE: CPT | Performed by: INTERNAL MEDICINE

## 2020-05-26 PROCEDURE — 80061 LIPID PANEL: CPT | Performed by: INTERNAL MEDICINE

## 2020-05-26 PROCEDURE — 81001 URINALYSIS AUTO W/SCOPE: CPT | Performed by: INTERNAL MEDICINE

## 2020-05-26 PROCEDURE — 84154 ASSAY OF PSA FREE: CPT | Performed by: INTERNAL MEDICINE

## 2020-05-26 PROCEDURE — 82306 VITAMIN D 25 HYDROXY: CPT | Performed by: INTERNAL MEDICINE

## 2020-05-26 PROCEDURE — 82550 ASSAY OF CK (CPK): CPT | Performed by: INTERNAL MEDICINE

## 2020-05-27 LAB
PSA FREE MFR SERPL: 39.2 %
PSA FREE SERPL-MCNC: 0.51 NG/ML
PSA SERPL-MCNC: 1.3 NG/ML (ref 0–4)

## 2020-05-28 NOTE — ASSESSMENT & PLAN NOTE
Stable bord lipids with LDL 71 at goal < 70; TGs better, elevated 316 with goal < 150 but this is good level for patient in consideration of past TGs; HDL remains low at 29 with goal > 40; cont fenofibrate 135mg QD #90, 3RF and vascepa 1gm 2 BID #360, 3RF; decrease saturated fats and cholesterol in the diet

## 2020-05-28 NOTE — ASSESSMENT & PLAN NOTE
Health maintenance - flu vacc 10/19 (rec annually in the fall); PVX 9/16 (repeat at age 65); Td 2/20 (Tdap 7/11, next Td due 2030), Zostavax 11/13; Shingrix and HAV done; CASSANDRA 7/19 w/ Dr. Bynum (PSA back down 1.3) - no f/u scheduled at this time; due for colonosc (last 6/15 with Dr. Brandon) - he has received notice and will call to schedule; eye exam w/ Dr. Inna BAI - delayed due to COVID19 (needs annually); dental exam q6 mos (pending in 2 wks); (+) seat belt use

## 2020-05-28 NOTE — ASSESSMENT & PLAN NOTE
BP elevated, which is an anomaly for him with review of flowsheet; rec low Na diet, increased aerobic exercise; start home BP monitoring with goal BP < 130/80; f/u in 3 month at the latest, earlier if persistently > 140/90

## 2020-05-28 NOTE — ASSESSMENT & PLAN NOTE
BG control worsened with a1C 6.93; on met ER 4 qd #360, 3RF and invokana 300mg QD #90, 3RF; encouraged reg phys activity to decr insulin resistance, moderation in unhealthy starches/sweets; f/u A1C in 3 mos - plan to add DM med at that time if no better

## 2020-05-29 ENCOUNTER — OFFICE VISIT (OUTPATIENT)
Dept: INTERNAL MEDICINE | Facility: CLINIC | Age: 60
End: 2020-05-29

## 2020-05-29 VITALS
BODY MASS INDEX: 29.38 KG/M2 | HEART RATE: 76 BPM | WEIGHT: 198.4 LBS | TEMPERATURE: 97.7 F | SYSTOLIC BLOOD PRESSURE: 162 MMHG | DIASTOLIC BLOOD PRESSURE: 90 MMHG | OXYGEN SATURATION: 97 % | HEIGHT: 69 IN

## 2020-05-29 DIAGNOSIS — R97.20 INCREASED PROSTATE SPECIFIC ANTIGEN (PSA) VELOCITY: ICD-10-CM

## 2020-05-29 DIAGNOSIS — I10 ESSENTIAL HYPERTENSION: ICD-10-CM

## 2020-05-29 DIAGNOSIS — E55.9 VITAMIN D DEFICIENCY: ICD-10-CM

## 2020-05-29 DIAGNOSIS — E11.65 TYPE 2 DIABETES MELLITUS WITH HYPERGLYCEMIA, WITHOUT LONG-TERM CURRENT USE OF INSULIN (HCC): ICD-10-CM

## 2020-05-29 DIAGNOSIS — Z00.00 PE (PHYSICAL EXAM), ROUTINE: Primary | ICD-10-CM

## 2020-05-29 DIAGNOSIS — E78.5 DYSLIPIDEMIA: ICD-10-CM

## 2020-05-29 PROCEDURE — 99396 PREV VISIT EST AGE 40-64: CPT | Performed by: INTERNAL MEDICINE

## 2020-05-29 PROCEDURE — 93000 ELECTROCARDIOGRAM COMPLETE: CPT | Performed by: INTERNAL MEDICINE

## 2020-05-29 RX ORDER — METFORMIN HYDROCHLORIDE 500 MG/1
2000 TABLET, EXTENDED RELEASE ORAL
Qty: 360 TABLET | Refills: 3 | Status: SHIPPED | OUTPATIENT
Start: 2020-05-29 | End: 2021-06-02 | Stop reason: SDUPTHER

## 2020-05-29 RX ORDER — FLUTICASONE PROPIONATE 50 MCG
SPRAY, SUSPENSION (ML) NASAL
COMMUNITY
Start: 2020-03-09 | End: 2021-04-09

## 2020-05-29 RX ORDER — LEVOCETIRIZINE DIHYDROCHLORIDE 5 MG/1
5 TABLET, FILM COATED ORAL EVERY EVENING
COMMUNITY
Start: 2020-03-09 | End: 2021-04-09

## 2020-05-29 RX ORDER — ICOSAPENT ETHYL 500 MG/1
CAPSULE, LIQUID FILLED ORAL
COMMUNITY
End: 2020-09-04

## 2020-05-29 RX ORDER — FENOFIBRIC ACID 135 MG/1
135 CAPSULE, DELAYED RELEASE ORAL DAILY
Qty: 90 CAPSULE | Refills: 3 | Status: SHIPPED | OUTPATIENT
Start: 2020-05-29 | End: 2021-06-02 | Stop reason: SDUPTHER

## 2020-05-29 RX ORDER — ICOSAPENT ETHYL 1000 MG/1
2 CAPSULE ORAL 2 TIMES DAILY WITH MEALS
Qty: 360 CAPSULE | Refills: 0 | Status: SHIPPED | OUTPATIENT
Start: 2020-05-29 | End: 2020-10-15 | Stop reason: SDUPTHER

## 2020-05-29 NOTE — PROGRESS NOTES
Chief Complaint   Patient presents with   • Annual Exam   • Diabetes   • Hyperlipidemia       History of Present Illness  59 y.o.  gentleman presents for updated phys examination.  Has not been checking any BPs or blood sugars.  Notes a lot of stress with COVID-19 as well as daughter getting  this weekend.  It will not happen on his porch.    Complains of indigestion for the last several weeks to months.  Basically at this time takes Tums on a daily basis.  Tums does relieve the sensation.  Denies any bloating and cramping.  Denies any vomiting.  No current known food triggers.    No regular exercise currently.    Reports recent dental visit for left upper tooth pain.  States that maybe he is grinding his teeth.  No decay noted.  Has regular cleaning in 2 weeks.    Review of Systems  Denies headaches, visual changes, CP, palpitations, SOB, cough,  n/v/d, difficulty with urination, numbness/tingling, falls, mood changes, lightheadedness, hearing changes, rashes.    ROS (+) for indigestion as above, relieved with Tums, no associated symptoms.  No bowel changes.     All other ROS reviewed and negative.    [facility]  The following portions of the patient's history were reviewed and updated as appropriate: allergies, current medications, past family history, past medical history, past social history, past surgical history and problem list.  SH: daughter getting  this weekend    Current Outpatient Medications:   •  aspirin 81 MG QD  •  Cholecalciferol (VITAMIN D3) 5000 UNITS capsule QD  •  EPINEPHrine (EPIPEN) 0.3 MG/0.3ML solution auto-injector injection prn  •  fenofibric acid (TRILIPIX) 135 MG QD  •  fluticasone (FLONASE) 50 MCG/ACT nasal spray AD  •  Icosapent Ethyl 0.5 g capsule QD  •  INVOKANA 300 MG QD  •  levocetirizine (XYZAL) 5 MG QD  •  metFORMIN ER (GLUCOPHAGE-XR) 500 MG 4 QD  •  Multiple Vitamins-Minerals (MULTIVITAMIN ADULT PO) QD  •  promethazine (PHENERGAN) 25 MG prn  •  VASCEPA 1 g  "capsule 2 BID    VITALS:  /90   Pulse 76   Temp 97.7 °F (36.5 °C)   Ht 175.3 cm (69\")   Wt 90 kg (198 lb 6.4 oz)   SpO2 97%   BMI 29.30 kg/m²   Repeat BP left arm 172/62, right arm 166/70  Physical Exam   Constitutional: He is oriented to person, place, and time. He appears well-developed and well-nourished.   HENT:   Head: Normocephalic.   Right Ear: External ear normal.   Left Ear: External ear normal.   Nose: Nose normal.   Mouth/Throat: Oropharynx is clear and moist and mucous membranes are normal. No oropharyngeal exudate.   Eyes: Pupils are equal, round, and reactive to light. Conjunctivae and EOM are normal.   Neck: Normal range of motion. Neck supple. Carotid bruit is not present (bilaterally). No thyromegaly present.   Cardiovascular: Normal rate, regular rhythm and normal heart sounds.   Pulmonary/Chest: Effort normal and breath sounds normal. No respiratory distress. He has no wheezes. He has no rales.   Abdominal: Soft. Bowel sounds are normal. He exhibits no distension and no mass. There is no hepatosplenomegaly. There is no tenderness.   Genitourinary:   Genitourinary Comments: /CASSANDRA deferred to urology   Musculoskeletal: He exhibits no edema.   Lymphadenopathy:     He has no cervical adenopathy.   Neurological: He is alert and oriented to person, place, and time. He displays normal reflexes. No cranial nerve deficit.   Skin: Skin is warm and dry. No rash noted.   Psychiatric: He has a normal mood and affect. His behavior is normal.   Nursing note and vitals reviewed.        LABS  Results for orders placed or performed in visit on 05/26/20   Comprehensive Metabolic Panel   Result Value Ref Range    Glucose 162 (H) 65 - 99 mg/dL    BUN 21 (H) 6 - 20 mg/dL    Creatinine 1.20 0.76 - 1.27 mg/dL    Sodium 138 136 - 145 mmol/L    Potassium 4.3 3.5 - 5.2 mmol/L    Chloride 104 98 - 107 mmol/L    CO2 21.7 (L) 22.0 - 29.0 mmol/L    Calcium 10.5 8.6 - 10.5 mg/dL    Total Protein 7.3 6.0 - 8.5 g/dL "    Albumin 4.70 3.50 - 5.20 g/dL    ALT (SGPT) 21 1 - 41 U/L    AST (SGOT) 24 1 - 40 U/L    Alkaline Phosphatase 35 (L) 39 - 117 U/L    Total Bilirubin 0.3 0.2 - 1.2 mg/dL    eGFR Non African Amer 62 >60 mL/min/1.73    Globulin 2.6 gm/dL    A/G Ratio 1.8 g/dL    BUN/Creatinine Ratio 17.5 7.0 - 25.0    Anion Gap 12.3 5.0 - 15.0 mmol/L   TSH   Result Value Ref Range    TSH 5.040 (H) 0.270 - 4.200 uIU/mL   Lipid Panel   Result Value Ref Range    Total Cholesterol 163 0 - 200 mg/dL    Triglycerides 316 (H) 0 - 150 mg/dL    HDL Cholesterol 29 (L) 40 - 60 mg/dL    LDL Cholesterol  71 0 - 100 mg/dL    VLDL Cholesterol 63.2 (H) 5 - 40 mg/dL    LDL/HDL Ratio 2.44    Microalbumin / Creatinine Urine Ratio - Urine, Clean Catch   Result Value Ref Range    Microalbumin/Creatinine Ratio      Creatinine, Urine 72.0 mg/dL    Microalbumin, Urine <1.2 mg/dL   Hemoglobin A1c   Result Value Ref Range    Hemoglobin A1C 6.93 (H) 4.80 - 5.60 %   CK   Result Value Ref Range    Creatine Kinase 209 (H) 20 - 200 U/L   Vitamin D 25 hydroxy   Result Value Ref Range    25 Hydroxy, Vitamin D 51.4 30.0 - 100.0 ng/ml   Vitamin B12   Result Value Ref Range    Vitamin B-12 432 211 - 946 pg/mL   PSA, Total & Free   Result Value Ref Range    PSA 1.3 0.0 - 4.0 ng/mL    PSA, Free 0.51 N/A ng/mL    PSA, Free % 39.2 %   CBC Auto Differential   Result Value Ref Range    WBC 7.76 3.40 - 10.80 10*3/mm3    RBC 5.65 4.14 - 5.80 10*6/mm3    Hemoglobin 16.2 13.0 - 17.7 g/dL    Hematocrit 47.3 37.5 - 51.0 %    MCV 83.7 79.0 - 97.0 fL    MCH 28.7 26.6 - 33.0 pg    MCHC 34.2 31.5 - 35.7 g/dL    RDW 14.3 12.3 - 15.4 %    RDW-SD 43.3 37.0 - 54.0 fl    MPV 10.8 6.0 - 12.0 fL    Platelets 230 140 - 450 10*3/mm3    Neutrophil % 39.0 (L) 42.7 - 76.0 %    Lymphocyte % 51.9 (H) 19.6 - 45.3 %    Monocyte % 6.4 5.0 - 12.0 %    Eosinophil % 1.5 0.3 - 6.2 %    Basophil % 0.9 0.0 - 1.5 %    Immature Grans % 0.3 0.0 - 0.5 %    Neutrophils, Absolute 3.02 1.70 - 7.00 10*3/mm3     Lymphocytes, Absolute 4.03 (H) 0.70 - 3.10 10*3/mm3    Monocytes, Absolute 0.50 0.10 - 0.90 10*3/mm3    Eosinophils, Absolute 0.12 0.00 - 0.40 10*3/mm3    Basophils, Absolute 0.07 0.00 - 0.20 10*3/mm3    Immature Grans, Absolute 0.02 0.00 - 0.05 10*3/mm3    nRBC 0.0 0.0 - 0.2 /100 WBC   Urinalysis without microscopic (no culture) - Urine, Clean Catch   Result Value Ref Range    Color, UA Yellow Yellow, Straw    Appearance, UA Clear Clear    pH, UA 5.5 5.0 - 8.0    Specific Gravity, UA >=1.030 1.005 - 1.030    Glucose, UA >=1000 mg/dL (3+) (A) Negative    Ketones, UA Negative Negative    Bilirubin, UA Negative Negative    Blood, UA Negative Negative    Protein, UA Negative Negative    Leuk Esterase, UA Negative Negative    Nitrite, UA Negative Negative    Urobilinogen, UA 0.2 E.U./dL 0.2 - 1.0 E.U./dL   Urinalysis, Microscopic Only - Urine, Clean Catch   Result Value Ref Range    RBC, UA 0-2 None Seen, 0-2 /HPF    WBC, UA 0-2 None Seen, 0-2 /HPF    Bacteria, UA None Seen None Seen /HPF    Squamous Epithelial Cells, UA 0-2 None Seen, 0-2 /HPF    Hyaline Casts, UA None Seen None Seen /LPF    Methodology Automated Microscopy      2/20 A1c 6.6  12/19 PSA 2.3      ECG 12 Lead  Date/Time: 5/29/2020 9:10 AM  Performed by: Violette Hylton MD  Authorized by: Violette Hylton MD   Comparison: compared with previous ECG from 5/17/2019  Similar to previous ECG  Rhythm: sinus rhythm  Rate: normal  BPM: 72  Conduction: conduction normal  ST Segments: ST segments normal  T Waves: T waves normal  T inversion: III  QRS axis: normal  Other findings: poor R wave progression  Clinical impression comment: stable EKG            ASSESSMENT/PLAN  Problem List Items Addressed This Visit        Cardiovascular and Mediastinum    Hypertension     BP elevated, which is an anomaly for him with review of flowsheet; rec low Na diet, increased aerobic exercise; start home BP monitoring with goal BP < 130/80; f/u in 3 month at the latest, earlier if  persistently > 140/90           Relevant Orders    ECG 12 Lead       Digestive    Vitamin D deficiency     Stable level 51.4 on 5000 units QD - he will cut back in the summer months            Endocrine    Type 2 diabetes mellitus with hyperglycemia, without long-term current use of insulin (CMS/[facility])     BG control worsened with a1C 6.93; on met ER 4 qd #360, 3RF and invokana 300mg QD #90, 3RF; encouraged reg phys activity to decr insulin resistance, moderation in unhealthy starches/sweets; f/u A1C in 3 mos - plan to add DM med at that time if no better           Relevant Medications    Canagliflozin (Invokana) 300 MG tablet    metFORMIN ER (GLUCOPHAGE-XR) 500 MG 24 hr tablet       Other    PE (physical exam), routine - Primary     Health maintenance - flu vacc 10/19 (rec annually in the fall); PVX 9/16 (repeat at age 65); Td 2/20 (Tdap 7/11, next Td due 2030), Zostavax 11/13; Shingrix and HAV done; CASSANDRA 7/19 w/ Dr. Bynum (PSA back down 1.3) - no f/u scheduled at this time; due for colonosc (last 6/15 with Dr. Brandon) - he has received notice and will call to schedule; eye exam w/ Dr. Inna BAI - delayed due to COVID19 (needs annually); dental exam q6 mos (pending in 2 wks); (+) seat belt use         Increased prostate specific antigen (PSA) velocity     PSA back down to 1.3 (was 2.3 in 12/19), 39.2% free PSA (5% risk); forward results to Dr. Bynum         Dyslipidemia     Stable bord lipids with LDL 71 at goal < 70; TGs better, elevated 316 with goal < 150 but this is good level for patient in consideration of past TGs; HDL remains low at 29 with goal > 40; cont fenofibrate 135mg QD #90, 3RF and vascepa 1gm 2 BID #360, 3RF; decrease saturated fats and cholesterol in the diet         Relevant Medications    icosapent ethyl (Vascepa) 1 g capsule capsule    fenofibric acid (TRILIPIX) 135 MG capsule delayed-release delayed release capsule          FOLLOW-UP  RTC 3 mos with A1C and for BP check    Electronically signed by:     Violette Hylton MD, FACP  05/29/2020

## 2020-06-11 PROBLEM — E13.3299 NONPROLIFERATIVE RETINOPATHY DUE TO SECONDARY DIABETES (HCC): Status: ACTIVE | Noted: 2020-06-11

## 2020-09-03 NOTE — ASSESSMENT & PLAN NOTE
BP bord/stable 132/78; rec low Na diet, increased aerobic exercise; home BP monitoring with goal BP < 130/80; no meds but consider ACEI/ARB if needed

## 2020-09-03 NOTE — ASSESSMENT & PLAN NOTE
BG control worsened with A1C 7.2; on invokana 300mg QD, met ER 500mg 4 QD; encouraged reg phys activity to decr insulin resistance, moderation in unhealthy starches/sweets; f/u A1C in 3 mos

## 2020-09-04 ENCOUNTER — OFFICE VISIT (OUTPATIENT)
Dept: INTERNAL MEDICINE | Facility: CLINIC | Age: 60
End: 2020-09-04

## 2020-09-04 VITALS
BODY MASS INDEX: 30.36 KG/M2 | WEIGHT: 205 LBS | HEIGHT: 69 IN | SYSTOLIC BLOOD PRESSURE: 132 MMHG | DIASTOLIC BLOOD PRESSURE: 78 MMHG | HEART RATE: 66 BPM | OXYGEN SATURATION: 99 %

## 2020-09-04 DIAGNOSIS — E11.3293 TYPE 2 DIABETES MELLITUS WITH BOTH EYES AFFECTED BY MILD NONPROLIFERATIVE RETINOPATHY WITHOUT MACULAR EDEMA, WITHOUT LONG-TERM CURRENT USE OF INSULIN (HCC): Primary | ICD-10-CM

## 2020-09-04 DIAGNOSIS — E66.09 CLASS 1 OBESITY DUE TO EXCESS CALORIES WITH SERIOUS COMORBIDITY AND BODY MASS INDEX (BMI) OF 30.0 TO 30.9 IN ADULT: ICD-10-CM

## 2020-09-04 DIAGNOSIS — I10 ESSENTIAL HYPERTENSION: ICD-10-CM

## 2020-09-04 LAB — HBA1C MFR BLD: 7.2 %

## 2020-09-04 PROCEDURE — 99214 OFFICE O/P EST MOD 30 MIN: CPT | Performed by: INTERNAL MEDICINE

## 2020-09-04 PROCEDURE — 83036 HEMOGLOBIN GLYCOSYLATED A1C: CPT | Performed by: INTERNAL MEDICINE

## 2020-09-04 NOTE — ASSESSMENT & PLAN NOTE
Caution regarding mild weight gain; note he is also wearing boots today; goal < 200lbs; challenged patient to have adequate intensity of exercise

## 2020-09-04 NOTE — PROGRESS NOTES
"Chief Complaint   Patient presents with   • Diabetes       History of Present Illness  59 y.o.   gentleman presents for DM follow-up.  Has not been checking blood sugars.  Thinks home blood pressures are stable but cannot give me any values.  Has been working from home; therefore, more sedentary.  Does take walks and includes walking during his meetings.  Notes some medication noncompliance, about once per week.    Review of Systems  Denies chest pain, palpitations, shortness of breath, headaches, visual changes, falls, fevers, chills, cold symptoms.  Reports missing his medications about once per week.  All other ROS reviewed and negative.    [facility]  The following portions of the patient's history were reviewed and updated as appropriate: allergies, current medications, past family history, past medical history, past social history, past surgical history and problem list.    Current Outpatient Medications:   •  aspirin 81 MG QD  •  Canagliflozin (Invokana) 300 MG QD  •  Cholecalciferol (VITAMIN D3) 5000 UNITS capsule QD  •  EPINEPHrine (EPIPEN) 0.3 MG/0.3ML prn  •  fenofibric acid (TRILIPIX) 135 MG capsule QD  •  fluticasone (FLONASE) 50 MCG/ACT nasal spray AD  •  icosapent ethyl (Vascepa) 1 g capsule 2 BID  •  levocetirizine (XYZAL) 5 MG QD  •  metFORMIN ER (GLUCOPHAGE-XR) 500 MG 4 QD  •  (MULTIVITAMIN ADULT PO QD  •  promethazine (PHENERGAN) 25 MG q8 prn      VITALS:  /78   Pulse 66   Ht 175.3 cm (69\")   Wt 93 kg (205 lb)   SpO2 99%   BMI 30.27 kg/m²     Physical Exam   Constitutional: He is oriented to person, place, and time. He appears well-developed and well-nourished.   Up 7 lbs over last 3 mos   Eyes: Conjunctivae and EOM are normal.   Cardiovascular: Normal rate, regular rhythm and normal heart sounds.   Pulmonary/Chest: Effort normal and breath sounds normal. No respiratory distress.   Neurological: He is alert and oriented to person, place, and time.   Psychiatric: He has a normal mood " and affect. His behavior is normal.   Nursing note and vitals reviewed.      LABS  Results for orders placed or performed in visit on 09/04/20   POC Glycosylated Hemoglobin (Hb A1C)   Result Value Ref Range    Hemoglobin A1C 7.2 %     5/20 A1C 6.93    ASSESSMENT/PLAN  Problem List Items Addressed This Visit        Cardiovascular and Mediastinum    Hypertension     BP bord/stable 132/78; rec low Na diet, increased aerobic exercise; home BP monitoring with goal BP < 130/80; no meds but consider ACEI/ARB if needed              Digestive    Obesity     Caution regarding mild weight gain; note he is also wearing boots today; goal < 200lbs; challenged patient to have adequate intensity of exercise            Endocrine    Type 2 diabetes mellitus with both eyes affected by mild nonproliferative retinopathy without macular edema, without long-term current use of insulin (CMS/[facility]) - Primary     BG control worsened with A1C 7.2; on invokana 300mg QD, met ER 500mg 4 QD; encouraged reg phys activity to decr insulin resistance, moderation in unhealthy starches/sweets; f/u A1C in 3 mos           Relevant Orders    POC Glycosylated Hemoglobin (Hb A1C) (Completed)          FOLLOW-UP  1. Health maintenance  - flu vacc recommended in the upcoming weeks (sept/Oct)  2. RTC 3 mos with A1C    Electronically signed by:    Violette Hylton MD, FACP  09/04/2020    EMR Dragon/Transcription disclaimer:  Parts of this encounter note is an electronic transcription/translation of spoken language to printed text. Electronic translation of spoken language may permit erroneous, or at times, nonsensical words or phrases, to be inadvertently transcribed. Although I have reviewed the note for such errors, some may still exist.

## 2020-09-24 PROBLEM — K57.30 DIVERTICULOSIS OF COLON: Status: ACTIVE | Noted: 2020-09-24

## 2020-10-15 ENCOUNTER — TELEPHONE (OUTPATIENT)
Dept: INTERNAL MEDICINE | Facility: CLINIC | Age: 60
End: 2020-10-15

## 2020-10-15 DIAGNOSIS — E78.5 DYSLIPIDEMIA: ICD-10-CM

## 2020-10-15 RX ORDER — ICOSAPENT ETHYL 1000 MG/1
2 CAPSULE ORAL 2 TIMES DAILY WITH MEALS
Qty: 360 CAPSULE | Refills: 2 | Status: SHIPPED | OUTPATIENT
Start: 2020-10-15 | End: 2021-06-02 | Stop reason: SDUPTHER

## 2020-10-15 NOTE — TELEPHONE ENCOUNTER
Patient stated he is using a new pharmacy and needs a new prescription sent bc the old one was .     Requesting:  icosapent ethyl (Vascepa) 1 g capsule    Send to:  [address] [place] [address] [phone] [facility] [phone]

## 2021-01-08 ENCOUNTER — OFFICE VISIT (OUTPATIENT)
Dept: INTERNAL MEDICINE | Facility: CLINIC | Age: 61
End: 2021-01-08

## 2021-01-08 VITALS
HEIGHT: 69 IN | WEIGHT: 205 LBS | BODY MASS INDEX: 30.36 KG/M2 | DIASTOLIC BLOOD PRESSURE: 82 MMHG | HEART RATE: 75 BPM | SYSTOLIC BLOOD PRESSURE: 124 MMHG | OXYGEN SATURATION: 98 %

## 2021-01-08 DIAGNOSIS — E11.3293 TYPE 2 DIABETES MELLITUS WITH BOTH EYES AFFECTED BY MILD NONPROLIFERATIVE RETINOPATHY WITHOUT MACULAR EDEMA, WITHOUT LONG-TERM CURRENT USE OF INSULIN (HCC): Primary | ICD-10-CM

## 2021-01-08 DIAGNOSIS — I10 ESSENTIAL HYPERTENSION: ICD-10-CM

## 2021-01-08 DIAGNOSIS — E13.3299 NONPROLIFERATIVE RETINOPATHY DUE TO SECONDARY DIABETES (HCC): ICD-10-CM

## 2021-01-08 LAB — HBA1C MFR BLD: 8.5 %

## 2021-01-08 PROCEDURE — 99214 OFFICE O/P EST MOD 30 MIN: CPT | Performed by: INTERNAL MEDICINE

## 2021-01-08 PROCEDURE — 83036 HEMOGLOBIN GLYCOSYLATED A1C: CPT | Performed by: INTERNAL MEDICINE

## 2021-01-08 RX ORDER — EMPAGLIFLOZIN, METFORMIN HYDROCHLORIDE 12.5; 1 MG/1; MG/1
2 TABLET, EXTENDED RELEASE ORAL DAILY
Qty: 30 TABLET | Refills: 3 | Status: CANCELLED | OUTPATIENT
Start: 2021-01-08

## 2021-01-08 NOTE — PROGRESS NOTES
"Chief Complaint   Patient presents with   • Diabetes   • Hypertension       History of Present Illness  60 y.o.  gentleman presents for DM and BP follow-up. Has not been checking BGs. Has not been exercising. Reports compliance with DM meds.    Review of Systems  Denies CP, palpitations, SOB, visual changes, headaches.  All other ROS reviewed and negative.    Current Outpatient Medications:   •  aspirin 81 MG QD  •  Canagliflozin (Invokana) 300 MG QD  •  Cholecalciferol (VITAMIN D3) 5000 UNITS capsule capsule, Take 5,000 Units by mouth Daily., Disp: , Rfl:   •  EPINEPHrine (EPIPEN) 0.3 MG/0.3ML AD  •  fenofibric acid (TRILIPIX) 135 MG QD  •  fluticasone (FLONASE) 50 MCG/ACT nasal spray AD  •  icosapent ethyl (Vascepa) 1 g 2 BID  •  levocetirizine (XYZAL) 5 MG QD  •  metFORMIN ER (GLUCOPHAGE-XR) 500 MG 4 QD  •  Multiple Vitamins-Minerals QD:   •  promethazine (PHENERGAN) 25 MG prn      VITALS:  /82   Pulse 75   Ht 175.3 cm (69\")   Wt 93 kg (205 lb)   SpO2 98%   BMI 30.27 kg/m²     Physical Exam  Vitals signs and nursing note reviewed.   Constitutional:       General: He is not in acute distress.     Appearance: Normal appearance.      Comments: Stable weight   Eyes:      Extraocular Movements: Extraocular movements intact.      Conjunctiva/sclera: Conjunctivae normal.   Cardiovascular:      Rate and Rhythm: Normal rate and regular rhythm.      Heart sounds: Normal heart sounds.   Pulmonary:      Effort: Pulmonary effort is normal. No respiratory distress.      Breath sounds: Normal breath sounds. No wheezing or rales.   Neurological:      Mental Status: He is alert and oriented to person, place, and time. Mental status is at baseline.      Gait: Gait normal.   Psychiatric:         Mood and Affect: Mood normal.         Behavior: Behavior normal.         LABS  Results for orders placed or performed in visit on 01/08/21   POC Glycosylated Hemoglobin (Hb A1C)    Specimen: Blood   Result Value Ref " Range    Hemoglobin A1C 8.5 %     Results for orders placed or performed in visit on 09/04/20   POC Glycosylated Hemoglobin (Hb A1C)    Specimen: Blood   Result Value Ref Range    Hemoglobin A1C 7.2 %       ASSESSMENT/PLAN    Diagnoses and all orders for this visit:    1. Type 2 diabetes mellitus with both eyes affected by mild nonproliferative retinopathy without macular edema, without long-term current use of insulin (CMS/[facility]) (Primary)  Assessment & Plan:  BG control worsened with A1C 8.5; on invokana 300mg QD and met ER 500mg 4 QD; reviewed med options, goals, side effects, and risks; patient will proceed with adding trulicity 0.75mg weekly - instructions given, patient took 1st dose in office today; discussed common s/e and risks, incl GII upset; also rec checking insurance coverage for Trulicity (vs victoza or ozempic); encouraged reg phys activity to decr insulin resistance, moderation in unhealthy starches/sweets; rec check insurance re: glucometer coverage and we will call in test strips/lancets; referral tro DM education/nutrition counseling for review; f/u in 1 month for BG review with addition og Trulicity and f/u A1C in 3 mos      Orders:  -     POC Glycosylated Hemoglobin (Hb A1C)  -     Ambulatory Referral to Diabetes Counseling  -     Dulaglutide (Trulicity) 0.75 MG/0.5ML solution pen-injector; Inject 0.75 mg under the skin into the appropriate area as directed 1 (One) Time Per Week.  Dispense: 12 pen    2. Hypertension  Assessment & Plan:  BP stable 124.82 with goal < 130/80; currently no meds      3. Nonproliferative retinopathy due to secondary diabetes (CMS/[facility])  Assessment & Plan:  A1C worsened 8.5; discussed importance of improving BG control to decrease likelihood of worsening retinopathy      Other orders  -     Cancel: Empagliflozin-metFORMIN HCl ER (Synjardy XR) 12.5-1000 MG tablet sustained-release 24 hour; Take 2 tablets by mouth Daily.  Dispense: 30 tablet; Refill: 3      FOLLOW-UP  1.  Health maintenance - flu vacc 9/20; rec COVID19 vacc when available  2. RTC 1 month for BG review after addition of trulicity (and f/u A1C in 3 mos)    Electronically signed by:    Violette Hylton MD, FACP  01/08/2021

## 2021-01-09 RX ORDER — DULAGLUTIDE 0.75 MG/.5ML
0.75 INJECTION, SOLUTION SUBCUTANEOUS WEEKLY
Qty: 12 PEN
Start: 2021-01-09 | End: 2021-02-19 | Stop reason: DRUGHIGH

## 2021-01-09 NOTE — ASSESSMENT & PLAN NOTE
A1C worsened 8.5; discussed importance of improving BG control to decrease likelihood of worsening retinopathy

## 2021-01-22 ENCOUNTER — APPOINTMENT (OUTPATIENT)
Dept: DIABETES SERVICES | Facility: HOSPITAL | Age: 61
End: 2021-01-22

## 2021-02-05 ENCOUNTER — APPOINTMENT (OUTPATIENT)
Dept: DIABETES SERVICES | Facility: HOSPITAL | Age: 61
End: 2021-02-05

## 2021-02-05 ENCOUNTER — TELEPHONE (OUTPATIENT)
Dept: INTERNAL MEDICINE | Facility: CLINIC | Age: 61
End: 2021-02-05

## 2021-02-05 NOTE — TELEPHONE ENCOUNTER
PATIENT IS REQUESTING A CALL BACK TO DISCUSS IF HE CAN  ANOTHER SAMPLE OF Dulaglutide (Trulicity) 0.75 MG/0.5ML solution pen-injector TO GET HIM THROUGH UNTIL HIS NEXT SCHEDULED APPOINTMENT THAT IS SCHEDULED FOR 2/19/21.    PLEASE ADVISE    CALL BACK NUMBER [phone]

## 2021-02-05 NOTE — TELEPHONE ENCOUNTER
Pt notified ok to  sample, in the fridge on the first floor and his wife will pick that up for him.

## 2021-02-18 NOTE — PROGRESS NOTES
"Chief Complaint   Patient presents with   • Diabetes       History of Present Illness  60 y.o.  gentleman presents for DM follow-up after addition of Trulicity. Has not had side effects. Reports FBGs in the 120s; not checking BGs consistently.    Insurance give him One Touch Verio Flex glucometer.    Review of Systems  Denies abd pain, n/v, CP, palpitations, SOB.  All other ROS reviewed and negative.    Current Outpatient Medications:   •  aspirin 81 MG QD  •  Canagliflozin (Invokana) 300 MG QD  •  Cholecalciferol (VITAMIN D3) 5000 UNITS QD  •  Dulaglutide (Trulicity) 0.75 MG/0.5ML weekly  •  EPINEPHrine (EPIPEN) 0.3 MG/0.3ML prn  •  fenofibric acid (TRILIPIX) 135 MG QD  •  fluticasone (FLONASE) 50 MCG/ACT nasal spray AD  •  icosapent ethyl (Vascepa) 1 g 2 BID  •  levocetirizine (XYZAL) 5 MG tablet, Take 5 mg by mouth Every Evening., Disp: , Rfl:   •  metFORMIN ER (GLUCOPHAGE-XR) 500 MG 4 QD  •  Multiple Vitamins-Minerals QD  •  promethazine (PHENERGAN) 25 MG prn    VITALS:  /76   Pulse 76   Ht 175.3 cm (69\")   Wt 89.4 kg (197 lb)   SpO2 100%   BMI 29.09 kg/m²     Physical Exam  Vitals signs and nursing note reviewed.   Constitutional:       General: He is not in acute distress.     Appearance: Normal appearance.      Comments: Down 8 lbs over last 5 weeks   Eyes:      Extraocular Movements: Extraocular movements intact.      Conjunctiva/sclera: Conjunctivae normal.   Cardiovascular:      Rate and Rhythm: Normal rate and regular rhythm.      Heart sounds: Normal heart sounds.   Pulmonary:      Effort: Pulmonary effort is normal. No respiratory distress.      Breath sounds: Normal breath sounds.   Neurological:      Mental Status: He is alert and oriented to person, place, and time. Mental status is at baseline.      Gait: Gait normal.   Psychiatric:         Mood and Affect: Mood normal.         Behavior: Behavior normal.         LABS  Results for orders placed or performed in visit on 01/08/21 "   POC Glycosylated Hemoglobin (Hb A1C)    Specimen: Blood   Result Value Ref Range    Hemoglobin A1C 8.5 %       ASSESSMENT/PLAN    Diagnoses and all orders for this visit:    1. Type 2 diabetes mellitus with both eyes affected by mild nonproliferative retinopathy without macular edema, without long-term current use of insulin (CMS/[facility]) (Primary)  Assessment & Plan:  BGs trending down; cont invokana 300mg QD, met ER 500mg 4 QD, and incr trulicity 1.5mg weekly; encouraged reg phys activity to decr insulin resistance, moderation in unhealthy starches/sweets; f/u A1C in 2 mos as scheduled      Orders:  -     Dulaglutide (Trulicity) 1.5 MG/0.5ML solution pen-injector; Inject 1.5 mg under the skin into the appropriate area as directed 1 (One) Time Per Week.  Dispense: 4 pen; Refill: 3    2. Hypertension  Assessment & Plan:  BP stable with goal < 130/80; no meds        FOLLOW-UP  1. Health maintenance - rec COVID19 vacc, counseling given  2. RTC 4/9/21 as scheduled with A1C    Electronically signed by:    Violette Hylton MD, FACP  02/19/2021

## 2021-02-18 NOTE — ASSESSMENT & PLAN NOTE
BGs trending down; cont invokana 300mg QD, met ER 500mg 4 QD, and incr trulicity 1.5mg weekly; encouraged reg phys activity to decr insulin resistance, moderation in unhealthy starches/sweets; f/u A1C in 2 mos as scheduled

## 2021-02-19 ENCOUNTER — OFFICE VISIT (OUTPATIENT)
Dept: INTERNAL MEDICINE | Facility: CLINIC | Age: 61
End: 2021-02-19

## 2021-02-19 VITALS
DIASTOLIC BLOOD PRESSURE: 76 MMHG | SYSTOLIC BLOOD PRESSURE: 132 MMHG | BODY MASS INDEX: 29.18 KG/M2 | WEIGHT: 197 LBS | OXYGEN SATURATION: 100 % | HEIGHT: 69 IN | HEART RATE: 76 BPM

## 2021-02-19 DIAGNOSIS — I10 ESSENTIAL HYPERTENSION: ICD-10-CM

## 2021-02-19 DIAGNOSIS — E11.3293 TYPE 2 DIABETES MELLITUS WITH BOTH EYES AFFECTED BY MILD NONPROLIFERATIVE RETINOPATHY WITHOUT MACULAR EDEMA, WITHOUT LONG-TERM CURRENT USE OF INSULIN (HCC): Primary | ICD-10-CM

## 2021-02-19 PROCEDURE — 99214 OFFICE O/P EST MOD 30 MIN: CPT | Performed by: INTERNAL MEDICINE

## 2021-02-19 RX ORDER — DULAGLUTIDE 1.5 MG/.5ML
1.5 INJECTION, SOLUTION SUBCUTANEOUS WEEKLY
Qty: 4 PEN | Refills: 3 | Status: SHIPPED | OUTPATIENT
Start: 2021-02-19 | End: 2021-06-02 | Stop reason: SDUPTHER

## 2021-02-22 RX ORDER — BLOOD SUGAR DIAGNOSTIC
STRIP MISCELLANEOUS
Qty: 100 EACH | Refills: 3 | Status: SHIPPED | OUTPATIENT
Start: 2021-02-22

## 2021-02-22 RX ORDER — LANCETS 33 GAUGE
1 EACH MISCELLANEOUS 3 TIMES DAILY
Qty: 100 EACH | Refills: 3 | Status: SHIPPED | OUTPATIENT
Start: 2021-02-22

## 2021-02-24 ENCOUNTER — HOSPITAL ENCOUNTER (OUTPATIENT)
Dept: DIABETES SERVICES | Facility: HOSPITAL | Age: 61
Setting detail: RECURRING SERIES
Discharge: HOME | End: 2021-02-24

## 2021-02-24 NOTE — CONSULTS
DIABETES EDUCATION CONSULT, 60 minutes  This medical referred consult was provided as a telephone call, tele-health or e-visit, as patient is unable to attend an in-office appointment due to the COVID-19 crisis. Consent for treatment was given verbally.Please see media tab for assessment and notes if you use EPIC. If you are not an EPIC user a copy of patient's assessment and notes will be sent per routine. Thank you.

## 2021-03-24 ENCOUNTER — HOSPITAL ENCOUNTER (OUTPATIENT)
Dept: DIABETES SERVICES | Facility: HOSPITAL | Age: 61
Setting detail: RECURRING SERIES
Discharge: HOME | End: 2021-03-24

## 2021-03-24 NOTE — CONSULTS
Diabetes Education    Patient Name:  Bradley Ortiz  YOB: 1960  MRN: [id number]  Admit Date:  3/24/2021        DIABETES EDUCATION CONSULT-Pt attended follow up visit via zoom telehealth video visit; 30 minute visit with RN and RD. This medical referred consult was provided as a telephone call, tele-health or e-visit, as patient is unable to attend an in-office appointment due to the COVID-19 crisis. Consent for treatment was given verbally.Please see media tab for assessment and notes if you use EPIC. If you are not an EPIC user a copy of patient's assessment and notes will be sent per routine. Thank you.       Electronically signed by:  Ruthann Payne RN, [facility]  03/24/21 13:46 EDT

## 2021-04-09 ENCOUNTER — OFFICE VISIT (OUTPATIENT)
Dept: INTERNAL MEDICINE | Facility: CLINIC | Age: 61
End: 2021-04-09

## 2021-04-09 VITALS
SYSTOLIC BLOOD PRESSURE: 120 MMHG | OXYGEN SATURATION: 98 % | HEART RATE: 77 BPM | WEIGHT: 191 LBS | HEIGHT: 69 IN | BODY MASS INDEX: 28.29 KG/M2 | DIASTOLIC BLOOD PRESSURE: 76 MMHG

## 2021-04-09 DIAGNOSIS — E11.3293 TYPE 2 DIABETES MELLITUS WITH BOTH EYES AFFECTED BY MILD NONPROLIFERATIVE RETINOPATHY WITHOUT MACULAR EDEMA, WITHOUT LONG-TERM CURRENT USE OF INSULIN (HCC): Primary | ICD-10-CM

## 2021-04-09 DIAGNOSIS — I10 ESSENTIAL HYPERTENSION: ICD-10-CM

## 2021-04-09 LAB — HBA1C MFR BLD: 6.2 %

## 2021-04-09 PROCEDURE — 99214 OFFICE O/P EST MOD 30 MIN: CPT | Performed by: INTERNAL MEDICINE

## 2021-04-09 PROCEDURE — 83036 HEMOGLOBIN GLYCOSYLATED A1C: CPT | Performed by: INTERNAL MEDICINE

## 2021-04-09 NOTE — PROGRESS NOTES
"Chief Complaint   Patient presents with   • Diabetes       History of Present Illness  60 y.o.  gentleman presents for DM and BP follow-up. (+) home BGs, fasting and nonfasting 120-160s. No low BGs. Has some nausea from trulicity, not just with volume of foods but also certain foods.     Review of Systems  Denies CP, palpitations, SOB, visual changes. All other ROS reviewed and negative.    Current Outpatient Medications:   •  aspirin 81 MG QD  •  Canagliflozin (Invokana) 300 MG QD  •  Cholecalciferol (VITAMIN D3) 5000 UNITS QD  •  Dulaglutide (Trulicity) 1.5 MG/0.5ML weekly  •  EPINEPHrine (EPIPEN) 0.3 MG/0.3ML solution auto-injector injection prn  •  fenofibric acid (TRILIPIX) 135 MG QD  •  fluticasone (FLONASE) 50 MCG/ACT nasal spray AD  •  icosapent ethyl (Vascepa) 1 g 2 BID  •  levocetirizine (XYZAL) 5 MG qD  •  metFORMIN  MG 4 QD  •  Multiple Vitamins-Minerals QD  •  promethazine (PHENERGAN) 25 MG prn      VITALS:  /76   Pulse 77   Ht 175.3 cm (69\")   Wt 86.6 kg (191 lb)   SpO2 98%   BMI 28.21 kg/m²     Physical Exam  Vitals and nursing note reviewed.   Constitutional:       General: He is not in acute distress.     Appearance: Normal appearance.      Comments: Down 14lbs over last 3 mos   Eyes:      Extraocular Movements: Extraocular movements intact.      Conjunctiva/sclera: Conjunctivae normal.   Cardiovascular:      Rate and Rhythm: Normal rate and regular rhythm.      Heart sounds: Normal heart sounds.   Pulmonary:      Effort: Pulmonary effort is normal. No respiratory distress.      Breath sounds: Normal breath sounds.   Neurological:      Mental Status: He is alert and oriented to person, place, and time. Mental status is at baseline.      Gait: Gait normal.   Psychiatric:         Mood and Affect: Mood normal.         Behavior: Behavior normal.         LABS  Results for orders placed or performed in visit on 04/09/21   POC Glycosylated Hemoglobin (Hb A1C)    Specimen: Blood "   Result Value Ref Range    Hemoglobin A1C 6.2 %     Results for orders placed or performed in visit on 01/08/21   POC Glycosylated Hemoglobin (Hb A1C)    Specimen: Blood   Result Value Ref Range    Hemoglobin A1C 8.5 %       ASSESSMENT/PLAN    Diagnoses and all orders for this visit:    1. Type 2 diabetes mellitus with both eyes affected by mild nonproliferative retinopathy without macular edema, without long-term current use of insulin (CMS/[facility]) (Primary)  Assessment & Plan:  BG control much improved with a1C 6.2; on met ER 500mg 4 QD, invokana 300mg QD, and trulicity 1.5mg weekly; encouraged reg phys activity to decr insulin resistance, moderation in unhealthy starches/sweets; needs regular eye exams with (+) DM retinopathy; f/u A1C in 3 mos      Orders:  -     POC Glycosylated Hemoglobin (Hb A1C)    2. Hypertension  Assessment & Plan:  BP stable 120/87; goal < 130/80; no meds        FOLLOW-UP  1. Health maintenance - COVID19 vacc #1 done 4/1/21 at [facility]  2. RTC for PE 6/4/21; fasting labs prior to appt (CBC, CMP, TSH, lipids, UA/micr, microalb, A1C, PSA, CPK, vit D, B12)    Electronically signed by:    Violette Hylton MD, FACP  04/09/2021

## 2021-04-09 NOTE — ASSESSMENT & PLAN NOTE
BG control much improved with a1C 6.2; on met ER 500mg 4 QD, invokana 300mg QD, and trulicity 1.5mg weekly; encouraged reg phys activity to decr insulin resistance, moderation in unhealthy starches/sweets; needs regular eye exams with (+) DM retinopathy; f/u A1C in 3 mos

## 2021-05-17 ENCOUNTER — TELEPHONE (OUTPATIENT)
Dept: INTERNAL MEDICINE | Facility: CLINIC | Age: 61
End: 2021-05-17

## 2021-05-17 DIAGNOSIS — I10 ESSENTIAL HYPERTENSION: ICD-10-CM

## 2021-05-17 DIAGNOSIS — Z00.00 PE (PHYSICAL EXAM), ROUTINE: Primary | ICD-10-CM

## 2021-05-17 DIAGNOSIS — E11.3293 TYPE 2 DIABETES MELLITUS WITH BOTH EYES AFFECTED BY MILD NONPROLIFERATIVE RETINOPATHY WITHOUT MACULAR EDEMA, WITHOUT LONG-TERM CURRENT USE OF INSULIN (HCC): ICD-10-CM

## 2021-05-17 DIAGNOSIS — E55.9 VITAMIN D DEFICIENCY: ICD-10-CM

## 2021-05-17 DIAGNOSIS — Z12.5 SCREENING FOR PROSTATE CANCER: ICD-10-CM

## 2021-05-28 ENCOUNTER — LAB (OUTPATIENT)
Dept: LAB | Facility: HOSPITAL | Age: 61
End: 2021-05-28

## 2021-05-28 DIAGNOSIS — E55.9 VITAMIN D DEFICIENCY: ICD-10-CM

## 2021-05-28 DIAGNOSIS — Z00.00 PE (PHYSICAL EXAM), ROUTINE: ICD-10-CM

## 2021-05-28 DIAGNOSIS — E11.3293 TYPE 2 DIABETES MELLITUS WITH BOTH EYES AFFECTED BY MILD NONPROLIFERATIVE RETINOPATHY WITHOUT MACULAR EDEMA, WITHOUT LONG-TERM CURRENT USE OF INSULIN (HCC): ICD-10-CM

## 2021-05-28 DIAGNOSIS — I10 ESSENTIAL HYPERTENSION: ICD-10-CM

## 2021-05-28 DIAGNOSIS — Z12.5 SCREENING FOR PROSTATE CANCER: ICD-10-CM

## 2021-05-28 LAB
25(OH)D3 SERPL-MCNC: 54 NG/ML
ALBUMIN SERPL-MCNC: 4.6 G/DL (ref 3.5–5.2)
ALBUMIN UR-MCNC: <1.2 MG/DL
ALBUMIN/GLOB SERPL: 1.9 G/DL
ALP SERPL-CCNC: 30 U/L (ref 39–117)
ALT SERPL W P-5'-P-CCNC: 15 U/L (ref 1–41)
ANION GAP SERPL CALCULATED.3IONS-SCNC: 10.1 MMOL/L (ref 5–15)
AST SERPL-CCNC: 12 U/L (ref 1–40)
BACTERIA UR QL AUTO: NORMAL /HPF
BASOPHILS # BLD AUTO: 0.1 10*3/MM3 (ref 0–0.2)
BASOPHILS NFR BLD AUTO: 1.7 % (ref 0–1.5)
BILIRUB SERPL-MCNC: 0.4 MG/DL (ref 0–1.2)
BILIRUB UR QL STRIP: NEGATIVE
BUN SERPL-MCNC: 21 MG/DL (ref 8–23)
BUN/CREAT SERPL: 17.2 (ref 7–25)
CALCIUM SPEC-SCNC: 10.2 MG/DL (ref 8.6–10.5)
CHLORIDE SERPL-SCNC: 106 MMOL/L (ref 98–107)
CHOLEST SERPL-MCNC: 184 MG/DL (ref 0–200)
CK SERPL-CCNC: 188 U/L (ref 20–200)
CLARITY UR: CLEAR
CO2 SERPL-SCNC: 23.9 MMOL/L (ref 22–29)
COLOR UR: YELLOW
CREAT SERPL-MCNC: 1.22 MG/DL (ref 0.76–1.27)
CREAT UR-MCNC: 76.3 MG/DL
DEPRECATED RDW RBC AUTO: 41.2 FL (ref 37–54)
EOSINOPHIL # BLD AUTO: 0.47 10*3/MM3 (ref 0–0.4)
EOSINOPHIL NFR BLD AUTO: 7.8 % (ref 0.3–6.2)
ERYTHROCYTE [DISTWIDTH] IN BLOOD BY AUTOMATED COUNT: 14 % (ref 12.3–15.4)
GFR SERPL CREATININE-BSD FRML MDRD: 61 ML/MIN/1.73
GLOBULIN UR ELPH-MCNC: 2.4 GM/DL
GLUCOSE SERPL-MCNC: 115 MG/DL (ref 65–99)
GLUCOSE UR STRIP-MCNC: ABNORMAL MG/DL
HBA1C MFR BLD: 5.93 % (ref 4.8–5.6)
HCT VFR BLD AUTO: 46.2 % (ref 37.5–51)
HDLC SERPL-MCNC: 33 MG/DL (ref 40–60)
HGB BLD-MCNC: 15.6 G/DL (ref 13–17.7)
HGB UR QL STRIP.AUTO: NEGATIVE
HYALINE CASTS UR QL AUTO: NORMAL /LPF
IMM GRANULOCYTES # BLD AUTO: 0.01 10*3/MM3 (ref 0–0.05)
IMM GRANULOCYTES NFR BLD AUTO: 0.2 % (ref 0–0.5)
KETONES UR QL STRIP: NEGATIVE
LDLC SERPL CALC-MCNC: 127 MG/DL (ref 0–100)
LDLC/HDLC SERPL: 3.76 {RATIO}
LEUKOCYTE ESTERASE UR QL STRIP.AUTO: NEGATIVE
LYMPHOCYTES # BLD AUTO: 2.82 10*3/MM3 (ref 0.7–3.1)
LYMPHOCYTES NFR BLD AUTO: 47 % (ref 19.6–45.3)
MCH RBC QN AUTO: 27.7 PG (ref 26.6–33)
MCHC RBC AUTO-ENTMCNC: 33.8 G/DL (ref 31.5–35.7)
MCV RBC AUTO: 82.1 FL (ref 79–97)
MICROALBUMIN/CREAT UR: NORMAL MG/G{CREAT}
MONOCYTES # BLD AUTO: 0.5 10*3/MM3 (ref 0.1–0.9)
MONOCYTES NFR BLD AUTO: 8.3 % (ref 5–12)
NEUTROPHILS NFR BLD AUTO: 2.1 10*3/MM3 (ref 1.7–7)
NEUTROPHILS NFR BLD AUTO: 35 % (ref 42.7–76)
NITRITE UR QL STRIP: NEGATIVE
NRBC BLD AUTO-RTO: 0 /100 WBC (ref 0–0.2)
PH UR STRIP.AUTO: 5.5 [PH] (ref 5–8)
PLATELET # BLD AUTO: 243 10*3/MM3 (ref 140–450)
PMV BLD AUTO: 10.6 FL (ref 6–12)
POTASSIUM SERPL-SCNC: 4.6 MMOL/L (ref 3.5–5.2)
PROT SERPL-MCNC: 7 G/DL (ref 6–8.5)
PROT UR QL STRIP: NEGATIVE
PSA SERPL-MCNC: 1.73 NG/ML (ref 0–4)
RBC # BLD AUTO: 5.63 10*6/MM3 (ref 4.14–5.8)
RBC # UR: NORMAL /HPF
REF LAB TEST METHOD: NORMAL
SODIUM SERPL-SCNC: 140 MMOL/L (ref 136–145)
SP GR UR STRIP: >=1.03 (ref 1–1.03)
SQUAMOUS #/AREA URNS HPF: NORMAL /HPF
TRIGL SERPL-MCNC: 134 MG/DL (ref 0–150)
TSH SERPL DL<=0.05 MIU/L-ACNC: 2.87 UIU/ML (ref 0.27–4.2)
UROBILINOGEN UR QL STRIP: ABNORMAL
VIT B12 BLD-MCNC: 418 PG/ML (ref 211–946)
VLDLC SERPL-MCNC: 24 MG/DL (ref 5–40)
WBC # BLD AUTO: 6 10*3/MM3 (ref 3.4–10.8)
WBC UR QL AUTO: NORMAL /HPF

## 2021-05-28 PROCEDURE — 80061 LIPID PANEL: CPT

## 2021-05-28 PROCEDURE — 82607 VITAMIN B-12: CPT

## 2021-05-28 PROCEDURE — 80053 COMPREHEN METABOLIC PANEL: CPT

## 2021-05-28 PROCEDURE — 82550 ASSAY OF CK (CPK): CPT

## 2021-05-28 PROCEDURE — 82043 UR ALBUMIN QUANTITATIVE: CPT

## 2021-05-28 PROCEDURE — 81001 URINALYSIS AUTO W/SCOPE: CPT

## 2021-05-28 PROCEDURE — 84443 ASSAY THYROID STIM HORMONE: CPT

## 2021-05-28 PROCEDURE — 82306 VITAMIN D 25 HYDROXY: CPT

## 2021-05-28 PROCEDURE — 82570 ASSAY OF URINE CREATININE: CPT

## 2021-05-28 PROCEDURE — G0103 PSA SCREENING: HCPCS

## 2021-05-28 PROCEDURE — 83036 HEMOGLOBIN GLYCOSYLATED A1C: CPT

## 2021-05-28 PROCEDURE — 85025 COMPLETE CBC W/AUTO DIFF WBC: CPT

## 2021-06-02 NOTE — ASSESSMENT & PLAN NOTE
Lipids worsened with  with goal < 70 (was 71 last year); not on a statin currently; takes fenofibrate 135mg QD #90, 3RF and vascepa 1gm 2 BID #360, 3RF; decrease saturated fats and cholesterol in the diet; repeat lipids in 4 mos

## 2021-06-02 NOTE — ASSESSMENT & PLAN NOTE
BG control stable/improved with A1C 5.93; on invokana 300mg QD #90, 3RF, Trulicity 1.5mg weekly #3mo, 3RF, and met 500mg 4 QD #360, 0RF; encouraged reg phys activity to decr insulin resistance, moderation in unhealthy starches/sweets; f/u A1C in 4 mos

## 2021-06-02 NOTE — PROGRESS NOTES
"Chief Complaint   Patient presents with   • Annual Exam   • Diabetes   • Hyperlipidemia   • Hypertension       History of Present Illness  60 y.o.  gentleman presents for updated phys examination as well as DM, cholesterol, and BP follow-up. Not checking BPs at home. Checking BGs, around 120s fasting, 140s nonfasting.     Review of Systems  Denies headaches, visual changes, CP, palpitations, SOB, cough, abd pain, n/v/d, difficulty with urination, numbness/tingling, falls, mood changes, lightheadedness, hearing changes, rashes.     All other ROS reviewed and negative.    Current Outpatient Medications:   •  aspirin 81 MG QD  •  Canagliflozin (Invokana) 300 MG QD  •  Cholecalciferol (VITAMIN D3) 5000 UNITS QD   •  Dulaglutide (Trulicity) 1.5 MG/0.5MLweekly  •  EPINEPHrine (EPIPEN) 0.3 MG/0.3ML sol AD  •  fenofibric acid (TRILIPIX) 135 MG QD  •  icosapent ethyl (Vascepa) 1 g 2 BID  •  metFORMIN ER (GLUCOPHAGE-XR) 500 MG 4 QD  •  Multiple Vitamins-Minerals QD  •  promethazine (PHENERGAN) 25 MG prn    VITALS:  /78 (BP Location: Left arm, Patient Position: Sitting)   Pulse 70   Ht 172.7 cm (68\")   Wt 87.3 kg (192 lb 6.4 oz)   SpO2 98%   BMI 29.25 kg/m²     Physical Exam  Vitals and nursing note reviewed.   Constitutional:       General: He is not in acute distress.     Appearance: Normal appearance. He is well-developed.   HENT:      Head: Normocephalic.      Right Ear: Ear canal and external ear normal.      Left Ear: Ear canal and external ear normal.      Nose: Nose normal.   Eyes:      Extraocular Movements: Extraocular movements intact.      Conjunctiva/sclera: Conjunctivae normal.      Pupils: Pupils are equal, round, and reactive to light.   Neck:      Vascular: No carotid bruit (bilaterally).   Cardiovascular:      Rate and Rhythm: Normal rate and regular rhythm.      Pulses:           Dorsalis pedis pulses are 2+ on the right side and 2+ on the left side.      Heart sounds: Normal heart " sounds.   Pulmonary:      Effort: Pulmonary effort is normal. No respiratory distress.      Breath sounds: Normal breath sounds.   Abdominal:      General: Bowel sounds are normal. There is no distension.      Palpations: Abdomen is soft. There is no mass.      Tenderness: There is no abdominal tenderness.   Genitourinary:     Comments: /CASSANDRA deferred to urology  Musculoskeletal:      Cervical back: Normal range of motion and neck supple.      Right foot: No deformity.      Left foot: No deformity.   Feet:      Right foot:      Skin integrity: Skin integrity normal.      Left foot:      Skin integrity: Skin integrity normal.      Comments: Diabetic Foot Exam Performed  Lymphadenopathy:      Cervical: No cervical adenopathy.   Skin:     General: Skin is warm and dry.      Findings: No rash.   Neurological:      Mental Status: He is alert and oriented to person, place, and time.      Cranial Nerves: No cranial nerve deficit.      Deep Tendon Reflexes: Reflexes normal.   Psychiatric:         Mood and Affect: Mood normal.         Behavior: Behavior normal.           LABS  Results for orders placed or performed in visit on 05/28/21   Comprehensive Metabolic Panel    Specimen: Blood   Result Value Ref Range    Glucose 115 (H) 65 - 99 mg/dL    BUN 21 8 - 23 mg/dL    Creatinine 1.22 0.76 - 1.27 mg/dL    Sodium 140 136 - 145 mmol/L    Potassium 4.6 3.5 - 5.2 mmol/L    Chloride 106 98 - 107 mmol/L    CO2 23.9 22.0 - 29.0 mmol/L    Calcium 10.2 8.6 - 10.5 mg/dL    Total Protein 7.0 6.0 - 8.5 g/dL    Albumin 4.60 3.50 - 5.20 g/dL    ALT (SGPT) 15 1 - 41 U/L    AST (SGOT) 12 1 - 40 U/L    Alkaline Phosphatase 30 (L) 39 - 117 U/L    Total Bilirubin 0.4 0.0 - 1.2 mg/dL    eGFR Non African Amer 61 >60 mL/min/1.73    Globulin 2.4 gm/dL    A/G Ratio 1.9 g/dL    BUN/Creatinine Ratio 17.2 7.0 - 25.0    Anion Gap 10.1 5.0 - 15.0 mmol/L   Lipid Panel    Specimen: Blood   Result Value Ref Range    Total Cholesterol 184 0 - 200 mg/dL     Triglycerides 134 0 - 150 mg/dL    HDL Cholesterol 33 (L) 40 - 60 mg/dL    LDL Cholesterol  127 (H) 0 - 100 mg/dL    VLDL Cholesterol 24 5 - 40 mg/dL    LDL/HDL Ratio 3.76    TSH    Specimen: Blood   Result Value Ref Range    TSH 2.870 0.270 - 4.200 uIU/mL   Hemoglobin A1c    Specimen: Blood   Result Value Ref Range    Hemoglobin A1C 5.93 (H) 4.80 - 5.60 %   PSA Screen    Specimen: Blood   Result Value Ref Range    PSA 1.730 0.000 - 4.000 ng/mL   CK    Specimen: Blood   Result Value Ref Range    Creatine Kinase 188 20 - 200 U/L   Vitamin D 25 Hydroxy    Specimen: Blood   Result Value Ref Range    25 Hydroxy, Vitamin D 54.0 ng/ml   Vitamin B12    Specimen: Blood   Result Value Ref Range    Vitamin B-12 418 211 - 946 pg/mL   CBC Auto Differential    Specimen: Blood   Result Value Ref Range    WBC 6.00 3.40 - 10.80 10*3/mm3    RBC 5.63 4.14 - 5.80 10*6/mm3    Hemoglobin 15.6 13.0 - 17.7 g/dL    Hematocrit 46.2 37.5 - 51.0 %    MCV 82.1 79.0 - 97.0 fL    MCH 27.7 26.6 - 33.0 pg    MCHC 33.8 31.5 - 35.7 g/dL    RDW 14.0 12.3 - 15.4 %    RDW-SD 41.2 37.0 - 54.0 fl    MPV 10.6 6.0 - 12.0 fL    Platelets 243 140 - 450 10*3/mm3    Neutrophil % 35.0 (L) 42.7 - 76.0 %    Lymphocyte % 47.0 (H) 19.6 - 45.3 %    Monocyte % 8.3 5.0 - 12.0 %    Eosinophil % 7.8 (H) 0.3 - 6.2 %    Basophil % 1.7 (H) 0.0 - 1.5 %    Immature Grans % 0.2 0.0 - 0.5 %    Neutrophils, Absolute 2.10 1.70 - 7.00 10*3/mm3    Lymphocytes, Absolute 2.82 0.70 - 3.10 10*3/mm3    Monocytes, Absolute 0.50 0.10 - 0.90 10*3/mm3    Eosinophils, Absolute 0.47 (H) 0.00 - 0.40 10*3/mm3    Basophils, Absolute 0.10 0.00 - 0.20 10*3/mm3    Immature Grans, Absolute 0.01 0.00 - 0.05 10*3/mm3    nRBC 0.0 0.0 - 0.2 /100 WBC   Microalbumin / Creatinine Urine Ratio - Urine, Clean Catch    Specimen: Urine, Clean Catch   Result Value Ref Range    Microalbumin/Creatinine Ratio      Creatinine, Urine 76.3 mg/dL    Microalbumin, Urine <1.2 mg/dL   Urinalysis without microscopic (no  culture) - Urine, Clean Catch    Specimen: Urine, Clean Catch   Result Value Ref Range    Color, UA Yellow Yellow, Straw    Appearance, UA Clear Clear    pH, UA 5.5 5.0 - 8.0    Specific Gravity, UA >=1.030 1.005 - 1.030    Glucose, UA >=1000 mg/dL (3+) (A) Negative    Ketones, UA Negative Negative    Bilirubin, UA Negative Negative    Blood, UA Negative Negative    Protein, UA Negative Negative    Leuk Esterase, UA Negative Negative    Nitrite, UA Negative Negative    Urobilinogen, UA 0.2 E.U./dL 0.2 - 1.0 E.U./dL   Urinalysis, Microscopic Only - Urine, Clean Catch    Specimen: Urine, Clean Catch   Result Value Ref Range    RBC, UA 0-2 None Seen, 0-2 /HPF    WBC, UA 0-2 None Seen, 0-2 /HPF    Bacteria, UA None Seen None Seen /HPF    Squamous Epithelial Cells, UA 0-2 None Seen, 0-2 /HPF    Hyaline Casts, UA 0-2 None Seen /LPF    Methodology Automated Microscopy      4/21 A1C 6.2    5/20 LDL 71      ECG 12 Lead    Date/Time: 6/4/2021 8:30 AM  Performed by: Violette Hylton MD  Authorized by: Violette Hylton MD   Comparison: compared with previous ECG from 5/29/2020  Similar to previous ECG  Rhythm: sinus rhythm  Rate: normal  BPM: 72  Conduction: conduction normal  ST Segments: ST segments normal  T Waves: T waves normal  T inversion: III (old)  QRS axis: normal  Clinical impression comment: stable EKG              ASSESSMENT/PLAN    Diagnoses and all orders for this visit:    1. PE (physical exam), routine (Primary)  Assessment & Plan:  Health maintenance - flu vacc 9/20; PVX 9/16; Td 2/20 (next Td due 2030), Zostavax 11/13; Shingrix and HAV done; COVID19 vacc done; CASSANDRA per Dr. Bynum, no f/u; nl colonosc 9/20, repeat 2025 per Dr. Brandon; eye exam w/ Dr. Keith 6/20 (+) DM retinopathy) due; dental exam UTD q6 mos; (+) seat belt use      2. Type 2 diabetes mellitus with both eyes affected by mild nonproliferative retinopathy without macular edema, without long-term current use of insulin (CMS/[facility])  Assessment & Plan:  BG  control stable/improved with A1C 5.93; on invokana 300mg QD #90, 3RF, Trulicity 1.5mg weekly #3mo, 3RF, and met 500mg 4 QD #360, 0RF; encouraged reg phys activity to decr insulin resistance, moderation in unhealthy starches/sweets; f/u A1C in 4 mos      Orders:  -     Dulaglutide (Trulicity) 1.5 MG/0.5ML solution pen-injector; Inject 1.5 mg under the skin into the appropriate area as directed 1 (One) Time Per Week.  Dispense: 5 pen; Refill: 3  -     metFORMIN ER (GLUCOPHAGE-XR) 500 MG 24 hr tablet; Take 4 tablets by mouth Daily With Breakfast.  Dispense: 360 tablet; Refill: 3  -     Canagliflozin (Invokana) 300 MG tablet tablet; Take 300 mg by mouth Daily.  Dispense: 90 tablet; Refill: 3  -     Hemoglobin A1c; Future    3. Hypertension  Assessment & Plan:  BP mildly elevated 138/76; no meds; rec low Na diet, increased aerobic exercise; rec home BP monitoring with goal BP < 130/80      Orders:  -     ECG 12 Lead    4. Hyperlipidemia  Assessment & Plan:  Lipids worsened with  with goal < 70 (was 71 last year); not on a statin currently; takes fenofibrate 135mg QD #90, 3RF and vascepa 1gm 2 BID #360, 3RF; decrease saturated fats and cholesterol in the diet; repeat lipids in 4 mos      Orders:  -     icosapent ethyl (Vascepa) 1 g capsule capsule; Take 2 g by mouth 2 (Two) Times a Day With Meals.  Dispense: 360 capsule; Refill: 2  -     fenofibric acid (TRILIPIX) 135 MG capsule delayed-release delayed release capsule; Take 1 capsule by mouth Daily.  Dispense: 90 capsule; Refill: 3  -     Lipid Panel; Future    5. Vitamin D deficiency  Assessment & Plan:  Stable vit D level 54 on 5000 units QD supplementation      6. Runny nose  Comments:  x 3 days, already improved w/ claritin and flonase; likely due to allergies/weather changes      FOLLOW-UP  RTC 4 mos with lipids, A1C (escribed)    Electronically signed by:    Violette Hylton MD, FACP  06/04/2021

## 2021-06-02 NOTE — ASSESSMENT & PLAN NOTE
BP mildly elevated 138/76; no meds; rec low Na diet, increased aerobic exercise; rec home BP monitoring with goal BP < 130/80

## 2021-06-02 NOTE — ASSESSMENT & PLAN NOTE
Health maintenance - flu vacc 9/20; PVX 9/16; Td 2/20 (next Td due 2030), Zostavax 11/13; Shingrix and HAV done; COVID19 vacc done; CASSANDRA per Dr. Bynum, no f/u; nl colonosc 9/20, repeat 2025 per Dr. Brandon; eye exam w/ Dr. Keith 6/20 (+) DM retinopathy) due; dental exam UTD q6 mos; (+) seat belt use

## 2021-06-04 ENCOUNTER — OFFICE VISIT (OUTPATIENT)
Dept: INTERNAL MEDICINE | Facility: CLINIC | Age: 61
End: 2021-06-04

## 2021-06-04 VITALS
SYSTOLIC BLOOD PRESSURE: 138 MMHG | HEART RATE: 70 BPM | WEIGHT: 192.4 LBS | DIASTOLIC BLOOD PRESSURE: 78 MMHG | BODY MASS INDEX: 29.16 KG/M2 | HEIGHT: 68 IN | OXYGEN SATURATION: 98 %

## 2021-06-04 DIAGNOSIS — I10 ESSENTIAL HYPERTENSION: ICD-10-CM

## 2021-06-04 DIAGNOSIS — E11.3293 TYPE 2 DIABETES MELLITUS WITH BOTH EYES AFFECTED BY MILD NONPROLIFERATIVE RETINOPATHY WITHOUT MACULAR EDEMA, WITHOUT LONG-TERM CURRENT USE OF INSULIN (HCC): ICD-10-CM

## 2021-06-04 DIAGNOSIS — R09.89 RUNNY NOSE: ICD-10-CM

## 2021-06-04 DIAGNOSIS — Z00.00 PE (PHYSICAL EXAM), ROUTINE: Primary | ICD-10-CM

## 2021-06-04 DIAGNOSIS — E78.2 MIXED HYPERLIPIDEMIA: ICD-10-CM

## 2021-06-04 DIAGNOSIS — E55.9 VITAMIN D DEFICIENCY: ICD-10-CM

## 2021-06-04 PROBLEM — H93.12 LEFT-SIDED TINNITUS: Status: ACTIVE | Noted: 2021-06-04

## 2021-06-04 PROCEDURE — 93000 ELECTROCARDIOGRAM COMPLETE: CPT | Performed by: INTERNAL MEDICINE

## 2021-06-04 PROCEDURE — 99396 PREV VISIT EST AGE 40-64: CPT | Performed by: INTERNAL MEDICINE

## 2021-06-04 RX ORDER — METFORMIN HYDROCHLORIDE 500 MG/1
2000 TABLET, EXTENDED RELEASE ORAL
Qty: 360 TABLET | Refills: 3 | Status: SHIPPED | OUTPATIENT
Start: 2021-06-04 | End: 2022-06-07 | Stop reason: SDUPTHER

## 2021-06-04 RX ORDER — FENOFIBRIC ACID 135 MG/1
135 CAPSULE, DELAYED RELEASE ORAL DAILY
Qty: 90 CAPSULE | Refills: 3 | Status: SHIPPED | OUTPATIENT
Start: 2021-06-04 | End: 2022-08-01

## 2021-06-04 RX ORDER — ICOSAPENT ETHYL 1000 MG/1
2 CAPSULE ORAL 2 TIMES DAILY WITH MEALS
Qty: 360 CAPSULE | Refills: 2 | Status: SHIPPED | OUTPATIENT
Start: 2021-06-04 | End: 2022-06-07 | Stop reason: SDUPTHER

## 2021-06-04 RX ORDER — CANAGLIFLOZIN 300 MG/1
300 TABLET, FILM COATED ORAL DAILY
Qty: 90 TABLET | Refills: 3 | Status: SHIPPED | OUTPATIENT
Start: 2021-06-04 | End: 2022-06-07 | Stop reason: SDUPTHER

## 2021-06-04 RX ORDER — DULAGLUTIDE 1.5 MG/.5ML
1.5 INJECTION, SOLUTION SUBCUTANEOUS WEEKLY
Qty: 5 PEN | Refills: 3 | Status: SHIPPED | OUTPATIENT
Start: 2021-06-04 | End: 2021-10-15 | Stop reason: SINTOL

## 2021-10-13 PROBLEM — K57.30 DIVERTICULOSIS OF COLON: Chronic | Status: ACTIVE | Noted: 2020-09-24

## 2021-10-13 PROBLEM — R97.20 INCREASED PROSTATE SPECIFIC ANTIGEN (PSA) VELOCITY: Chronic | Status: ACTIVE | Noted: 2019-05-11

## 2021-10-13 PROBLEM — E83.52 HYPERCALCEMIA: Chronic | Status: ACTIVE | Noted: 2017-03-03

## 2021-10-13 PROBLEM — G56.03 CARPAL TUNNEL SYNDROME, BILATERAL: Chronic | Status: ACTIVE | Noted: 2017-09-15

## 2021-10-13 PROBLEM — E13.3299: Chronic | Status: ACTIVE | Noted: 2020-06-11

## 2021-10-13 PROBLEM — Z00.00 PE (PHYSICAL EXAM), ROUTINE: Chronic | Status: ACTIVE | Noted: 2017-03-03

## 2021-10-13 PROBLEM — N40.0 BENIGN PROSTATIC HYPERPLASIA WITHOUT LOWER URINARY TRACT SYMPTOMS: Chronic | Status: ACTIVE | Noted: 2019-09-08

## 2021-10-13 PROBLEM — H93.12 LEFT-SIDED TINNITUS: Chronic | Status: ACTIVE | Noted: 2021-06-04

## 2021-10-14 NOTE — PROGRESS NOTES
"Chief Complaint   Patient presents with   • Diabetes   • Hyperlipidemia       History of Present Illness  61 y.o.  gentleman presents for DM and cholesterol follow-up. (+) home BGs elevated. Has been less active at work. Also stopped trulicity about 1 month ago because he started getting welps at injection site, whether on thighs or abdomen.    Complains also of left elbow pain that started about 1 month ago.  Thinks is from repetitive use.  Denies associated numbness or tingling.  Is at the outer aspect.  He is right-handed.  Has taken a little bit of Advil here and there for it.    Review of Systems  Denies visual changes, CP, palpitations, SOB, falls. ROS (+) for outer left elbow pain x 1 month with minimal forearm radiation, no assoc'd numbness/tingling. Has been compensating some lifting with right hand/arm.  All other ROS reviewed and negative.    Current Outpatient Medications:   •  aspirin 81 MG QD  •  Canagliflozin (Invokana) 300 MG QD  •  Cholecalciferol (VITAMIN D3) 5000 UNITS QD   •  EPINEPHrine (EPIPEN) 0.3 MG/0.3ML solution auto-injector injection AD  •  fenofibric acid (TRILIPIX) 135 MG QD  •  icosapent ethyl (Vascepa) 1 g capsule 2 BID  •  metFORMIN ER (GLUCOPHAGE-XR) 500 MG 4 QD  •  Multiple Vitamins-Minerals QD  •  promethazine (PHENERGAN) 25 MG prn       VITALS:  /76   Pulse 73   Ht 172.7 cm (68\")   Wt 92.1 kg (203 lb)   SpO2 98%   BMI 30.87 kg/m²     Physical Exam  Vitals and nursing note reviewed.   Constitutional:       General: He is not in acute distress.     Appearance: Normal appearance. He is not ill-appearing.      Comments: 9 pound weight gain in the last 4 months   Eyes:      Extraocular Movements: Extraocular movements intact.      Conjunctiva/sclera: Conjunctivae normal.   Cardiovascular:      Rate and Rhythm: Normal rate and regular rhythm.   Pulmonary:      Effort: Pulmonary effort is normal. No respiratory distress.   Musculoskeletal:         General: Tenderness " (Left lateral epicondyle tend to palpation with mild radiation to the proximal forearm, no swelling, erythema, or increased warmth; left elbow full range of motion) present.   Neurological:      Mental Status: He is alert and oriented to person, place, and time. Mental status is at baseline.   Psychiatric:         Mood and Affect: Mood normal.         Behavior: Behavior normal.         LABS  Results for orders placed or performed in visit on 10/15/21   POC Glycosylated Hemoglobin (Hb A1C)    Specimen: Blood   Result Value Ref Range    Hemoglobin A1C 7.4 %    Lot Number [id number]     Expiration Date 05/19/23 5/21 , A1C 5.93    ASSESSMENT/PLAN    Diagnoses and all orders for this visit:    1. Type 2 diabetes mellitus with both eyes affected by mild nonproliferative retinopathy without macular edema, without long-term current use of insulin (HCC) (Primary)  Assessment & Plan:  BG control worsened with A1c 7.4; short-term goal to get A1c <7.4; note local reactions to Trulicity injections; will cont invokana 300mg QD and met ER 500mg 4 QD, and change Trulicity to Ozempic -await samples to show patient how to utilize the medication; will ramp up the Ozempic 0.25 mg weekly to 0.5 mg weekly and then have him follow-up in 1 month for BG review and follow-up A1c in 3 months; encouraged reg phys activity to decr insulin resistance, moderation in unhealthy starches/sweets    Orders:  -     POC Glycosylated Hemoglobin (Hb A1C)  -     Semaglutide,0.25 or 0.5MG/DOS, (Ozempic, 0.25 or 0.5 MG/DOSE,) 2 MG/1.5ML solution pen-injector; Inject 0.5 mg under the skin into the appropriate area as directed 1 (One) Time Per Week.    2. Hyperlipidemia  Assessment & Plan:  Update lipids with goal LDL < 70; on fenofibrate 135mg QD and vascepa 1gm 2 BID; will advise statin if not close to goal      3. Hypertension  Assessment & Plan:  BP stable 124/76; goal < 130/80; no meds      4. Obesity  Assessment & Plan:  Worsened BMI 30.9;  comorbid conditions include HTN, DM, hyperlipidemia; encouraged increased aerobic activity; moderation in portions; f/u in 3 mos      5. Left lateral epicondylitis  Comments:  x 1 mo; rec ibuprofen 3-4 tabs 3x/day with food, heating pad, and minimizing repetitive mov'ts; if does not turn the corner, rec proceeding with PT      FOLLOW-UP  1. Health maintenance - COVID19 vacc done, rec proceeding with Pfizer dose #3 at end of 10/21 (due to DM); flu vacc done 10/7/21; eye exam w/ Dr. Richter done last week (has DM retinopathy, needs to be done annually)  2. Lipids on the way out the door (had sm piece of meat this morning per patient)  3. RTC 1 month for BG review after initiation of Ozempic  4. RTC 3 mos with A1C    Electronically signed by:    Violette Hylton MD, FACP  10/15/2021

## 2021-10-14 NOTE — ASSESSMENT & PLAN NOTE
BG control worsened with A1c 7.4; short-term goal to get A1c <7.4; note local reactions to Trulicity injections; will cont invokana 300mg QD and met ER 500mg 4 QD, and change Trulicity to Ozempic -await samples to show patient how to utilize the medication; will ramp up the Ozempic 0.25 mg weekly to 0.5 mg weekly and then have him follow-up in 1 month for BG review and follow-up A1c in 3 months; encouraged reg phys activity to decr insulin resistance, moderation in unhealthy starches/sweets

## 2021-10-14 NOTE — ASSESSMENT & PLAN NOTE
Update lipids with goal LDL < 70; on fenofibrate 135mg QD and vascepa 1gm 2 BID; will advise statin if not close to goal

## 2021-10-15 ENCOUNTER — OFFICE VISIT (OUTPATIENT)
Dept: INTERNAL MEDICINE | Facility: CLINIC | Age: 61
End: 2021-10-15

## 2021-10-15 ENCOUNTER — LAB (OUTPATIENT)
Dept: LAB | Facility: HOSPITAL | Age: 61
End: 2021-10-15

## 2021-10-15 VITALS
HEART RATE: 73 BPM | WEIGHT: 203 LBS | OXYGEN SATURATION: 98 % | BODY MASS INDEX: 30.77 KG/M2 | DIASTOLIC BLOOD PRESSURE: 76 MMHG | SYSTOLIC BLOOD PRESSURE: 124 MMHG | HEIGHT: 68 IN

## 2021-10-15 DIAGNOSIS — M77.12 LEFT LATERAL EPICONDYLITIS: ICD-10-CM

## 2021-10-15 DIAGNOSIS — E78.2 MIXED HYPERLIPIDEMIA: Chronic | ICD-10-CM

## 2021-10-15 DIAGNOSIS — I10 ESSENTIAL HYPERTENSION: Chronic | ICD-10-CM

## 2021-10-15 DIAGNOSIS — E78.2 MIXED HYPERLIPIDEMIA: ICD-10-CM

## 2021-10-15 DIAGNOSIS — E11.3293 TYPE 2 DIABETES MELLITUS WITH BOTH EYES AFFECTED BY MILD NONPROLIFERATIVE RETINOPATHY WITHOUT MACULAR EDEMA, WITHOUT LONG-TERM CURRENT USE OF INSULIN (HCC): Primary | Chronic | ICD-10-CM

## 2021-10-15 DIAGNOSIS — E66.09 CLASS 1 OBESITY DUE TO EXCESS CALORIES WITH SERIOUS COMORBIDITY AND BODY MASS INDEX (BMI) OF 30.0 TO 30.9 IN ADULT: Chronic | ICD-10-CM

## 2021-10-15 LAB
ARTICHOKE IGE QN: 79 MG/DL (ref 0–100)
CHOLEST SERPL-MCNC: 277 MG/DL (ref 0–200)
EXPIRATION DATE: NORMAL
HBA1C MFR BLD: 7.4 %
HDLC SERPL-MCNC: ABNORMAL MG/DL
LDLC SERPL CALC-MCNC: ABNORMAL MG/DL
LDLC/HDLC SERPL: ABNORMAL {RATIO}
Lab: NORMAL
TRIGL SERPL-MCNC: 1475 MG/DL (ref 0–150)
VLDLC SERPL-MCNC: ABNORMAL MG/DL

## 2021-10-15 PROCEDURE — 80061 LIPID PANEL: CPT

## 2021-10-15 PROCEDURE — 83036 HEMOGLOBIN GLYCOSYLATED A1C: CPT | Performed by: INTERNAL MEDICINE

## 2021-10-15 PROCEDURE — 99214 OFFICE O/P EST MOD 30 MIN: CPT | Performed by: INTERNAL MEDICINE

## 2021-10-15 PROCEDURE — 83721 ASSAY OF BLOOD LIPOPROTEIN: CPT

## 2021-10-15 RX ORDER — SEMAGLUTIDE 1.34 MG/ML
0.5 INJECTION, SOLUTION SUBCUTANEOUS WEEKLY
Start: 2021-10-15 | End: 2022-02-21 | Stop reason: SDUPTHER

## 2021-10-15 NOTE — ASSESSMENT & PLAN NOTE
Worsened BMI 30.9; comorbid conditions include HTN, DM, hyperlipidemia; encouraged increased aerobic activity; moderation in portions; f/u in 3 mos

## 2021-10-16 NOTE — PROGRESS NOTES
LDL much improved and near goal at 79 (goal < 70).  TGs are very, very elevated.- cont fenofibrate and vascepa and really try to restrict fats intake over the next several months (leaner meats, low fat, avoid fried foods, avoid fast good, more fresh fruits and vegetables). Repeat lipids in 3 mos for next visit (ie don't forget to do fasting labs the week prior to next visit)

## 2021-10-19 PROBLEM — U07.1 COVID-19 VIRUS DETECTED: Status: ACTIVE | Noted: 2021-10-18

## 2022-02-21 ENCOUNTER — PATIENT MESSAGE (OUTPATIENT)
Dept: INTERNAL MEDICINE | Facility: CLINIC | Age: 62
End: 2022-02-21

## 2022-02-21 DIAGNOSIS — E11.3293 TYPE 2 DIABETES MELLITUS WITH BOTH EYES AFFECTED BY MILD NONPROLIFERATIVE RETINOPATHY WITHOUT MACULAR EDEMA, WITHOUT LONG-TERM CURRENT USE OF INSULIN: Chronic | ICD-10-CM

## 2022-02-21 RX ORDER — SEMAGLUTIDE 1.34 MG/ML
0.5 INJECTION, SOLUTION SUBCUTANEOUS WEEKLY
Qty: 3 ML | Refills: 0 | Status: SHIPPED | OUTPATIENT
Start: 2022-02-21 | End: 2022-03-25 | Stop reason: DRUGHIGH

## 2022-02-22 NOTE — TELEPHONE ENCOUNTER
From: Bradley Ortiz  To: Violette Hylton MD  Sent: 2/21/2022 1:19 PM EST  Subject: Ozempic    I had some samples of Ozempic but have run out. The injections seemed to be helping based on my Glucose meter testing results. Do I need to schedule an appointment to get a prescription written or can it be sent to the pharmacy. Was taking 0.5 mg.  Thank you

## 2022-03-23 NOTE — PROGRESS NOTES
"Chief Complaint   Patient presents with   • Diabetes   • Hyperlipidemia   • Hypertension       History of Present Illness  61 y.o.  gentleman presents for DM and cholesterol follow-up.  Not checking blood sugars or blood pressures consistently at home.    Has a new grandbaby but otherwise no regular exercise at this time.  No side effects with Ozempic, noting side effects previously with Trulicity.      Review of Systems  Denies visual changes, CP, palpitations, SOB.  All other ROS reviewed and negative.    Current Outpatient Medications:   •  aspirin 81 MG QD  •  Canagliflozin (Invokana) 300 MG QD  •  Cholecalciferol (VITAMIN D3) 5000 UNITS QD  •  EPINEPHrine (EPIPEN) 0.3 MG/0.3ML solution AD  •  fenofibric acid (TRILIPIX) 135 MG QD  •  icosapent ethyl (Vascepa) 1 g 2 BID  •  metFORMIN  MG 4 QD  •  Multiple Vitamins-Minerals QD  •  Semaglutide,0.25 or 0.5MG/DOS, (Ozempic, 0.25 or 0.5 MG/DOSE,) 0.5mg weekly    VITALS:  /94   Pulse 70   Ht 172.7 cm (68\")   Wt 90.7 kg (200 lb)   SpO2 99%   BMI 30.41 kg/m²   Repeat /82 left arm    Physical Exam  Vitals and nursing note reviewed.   Constitutional:       General: He is not in acute distress.     Appearance: Normal appearance. He is not ill-appearing.   Eyes:      Extraocular Movements: Extraocular movements intact.      Conjunctiva/sclera: Conjunctivae normal.   Pulmonary:      Effort: Pulmonary effort is normal. No respiratory distress.   Neurological:      Mental Status: He is alert and oriented to person, place, and time. Mental status is at baseline.   Psychiatric:         Mood and Affect: Mood normal.         Behavior: Behavior normal.         LABS  Results for orders placed or performed in visit on 03/25/22   POC Glycosylated Hemoglobin (Hb A1C)    Specimen: Blood   Result Value Ref Range    Hemoglobin A1C 7.3 %    Lot Number [id number]     Expiration Date 12/10/23      Results for orders placed or performed in visit on 10/15/21 "   Lipid Panel    Specimen: Blood   Result Value Ref Range    Total Cholesterol 277 (H) 0 - 200 mg/dL    Triglycerides 1,475 (H) 0 - 150 mg/dL    HDL Cholesterol      LDL Cholesterol       VLDL Cholesterol      LDL/HDL Ratio     LDL Cholesterol, Direct    Specimen: Blood   Result Value Ref Range    LDL Cholesterol  79 0 - 100 mg/dL     10/21 A1C 7.4      ASSESSMENT/PLAN    Diagnoses and all orders for this visit:    1. Type 2 diabetes mellitus with both eyes affected by mild nonproliferative retinopathy without macular edema, without long-term current use of insulin (HCC) (Primary)  Assessment & Plan:  BG control unchanged with A1C 7.3; on met ER 500mg 4 QD, invokana 300mg QD, and ozempic 0.5mg weekly - incr to Ozempic 1mg weekly; encouraged reg phys activity to decr insulin resistance, moderation in unhealthy starches/sweets; eye exam pending with Dr. Richter next Wed - need office note; f/u A1C in 3 mos      Orders:  -     POC Glycosylated Hemoglobin (Hb A1C)  -     Semaglutide, 1 MG/DOSE, (OZEMPIC) 2 MG/1.5ML solution pen-injector; Inject 1 mg under the skin into the appropriate area as directed 1 (One) Time Per Week.  Dispense: 6 pen; Refill: 0    2. Hyperlipidemia  Assessment & Plan:  Overdue for f/u lipids with goal TG < 150, goal LDL < 70; on fenofibrate 135mg QD and vascepa 1gm 2 BID and note very elevated triglycerides 1475 on last labs    Orders:  -     Lipid Panel    3. Hypertension  Assessment & Plan:  Blood pressure elevated 154/94, still elevated on repeat check; discussed low-sodium diet and role of aerobic exercise; strongly recommend consideration for ACE inhibitor if no better; follow-up in 3 months      4. Benign prostatic hyperplasia with weak urinary stream  Assessment & Plan:  Now symptomatic with decreased urinary stream; discussed issues of hesitancy, nocturia, or incomplete emptying of the bladder; discussed medications that could exacerbate his symptoms; consider 30-day trial of saw  palmetto; discussed role of prescription medications; follow clinically for now        FOLLOW-UP  1. Health maintenance - COVID19 vacc done, rec proceeding with 3rd dose booster; flu vacc 10/21; eye exam pending next Wed w/ Dr. Richter (needs annually)  2. RTC for PE 6/7/22; fasting labs prior to appt (CBC, CMP, TSH, lipids, UA/micr, microalb, A1C, PSA, CPK, vit D, B12) - also BP f/u    Electronically signed by:    Violette Hylton MD, FACP  03/25/2022

## 2022-03-24 NOTE — ASSESSMENT & PLAN NOTE
BG control unchanged with A1C 7.3; on met ER 500mg 4 QD, invokana 300mg QD, and ozempic 0.5mg weekly - incr to Ozempic 1mg weekly; encouraged reg phys activity to decr insulin resistance, moderation in unhealthy starches/sweets; eye exam pending with Dr. Richter next Wed - need office note; f/u A1C in 3 mos

## 2022-03-24 NOTE — ASSESSMENT & PLAN NOTE
Overdue for f/u lipids with goal TG < 150, goal LDL < 70; on fenofibrate 135mg QD and vascepa 1gm 2 BID and note very elevated triglycerides 1475 on last labs

## 2022-03-24 NOTE — ASSESSMENT & PLAN NOTE
Blood pressure elevated 154/94, still elevated on repeat check; discussed low-sodium diet and role of aerobic exercise; strongly recommend consideration for ACE inhibitor if no better; follow-up in 3 months

## 2022-03-25 ENCOUNTER — OFFICE VISIT (OUTPATIENT)
Dept: INTERNAL MEDICINE | Facility: CLINIC | Age: 62
End: 2022-03-25

## 2022-03-25 ENCOUNTER — LAB (OUTPATIENT)
Dept: LAB | Facility: HOSPITAL | Age: 62
End: 2022-03-25

## 2022-03-25 VITALS
SYSTOLIC BLOOD PRESSURE: 154 MMHG | WEIGHT: 200 LBS | BODY MASS INDEX: 30.31 KG/M2 | HEIGHT: 68 IN | OXYGEN SATURATION: 99 % | HEART RATE: 70 BPM | DIASTOLIC BLOOD PRESSURE: 94 MMHG

## 2022-03-25 DIAGNOSIS — R39.12 BENIGN PROSTATIC HYPERPLASIA WITH WEAK URINARY STREAM: ICD-10-CM

## 2022-03-25 DIAGNOSIS — E11.3293 TYPE 2 DIABETES MELLITUS WITH BOTH EYES AFFECTED BY MILD NONPROLIFERATIVE RETINOPATHY WITHOUT MACULAR EDEMA, WITHOUT LONG-TERM CURRENT USE OF INSULIN: Primary | Chronic | ICD-10-CM

## 2022-03-25 DIAGNOSIS — I10 ESSENTIAL HYPERTENSION: Chronic | ICD-10-CM

## 2022-03-25 DIAGNOSIS — E78.2 MIXED HYPERLIPIDEMIA: Chronic | ICD-10-CM

## 2022-03-25 DIAGNOSIS — N40.1 BENIGN PROSTATIC HYPERPLASIA WITH WEAK URINARY STREAM: ICD-10-CM

## 2022-03-25 LAB
CHOLEST SERPL-MCNC: 199 MG/DL (ref 0–200)
EXPIRATION DATE: NORMAL
HBA1C MFR BLD: 7.3 %
HDLC SERPL-MCNC: 25 MG/DL (ref 40–60)
LDLC SERPL CALC-MCNC: 68 MG/DL (ref 0–100)
LDLC/HDLC SERPL: 1.49 {RATIO}
Lab: NORMAL
TRIGL SERPL-MCNC: 684 MG/DL (ref 0–150)
VLDLC SERPL-MCNC: 106 MG/DL (ref 5–40)

## 2022-03-25 PROCEDURE — 83036 HEMOGLOBIN GLYCOSYLATED A1C: CPT | Performed by: INTERNAL MEDICINE

## 2022-03-25 PROCEDURE — 99214 OFFICE O/P EST MOD 30 MIN: CPT | Performed by: INTERNAL MEDICINE

## 2022-03-25 PROCEDURE — 80061 LIPID PANEL: CPT | Performed by: INTERNAL MEDICINE

## 2022-03-25 NOTE — ASSESSMENT & PLAN NOTE
Now symptomatic with decreased urinary stream; discussed issues of hesitancy, nocturia, or incomplete emptying of the bladder; discussed medications that could exacerbate his symptoms; consider 30-day trial of saw palmetto; discussed role of prescription medications; follow clinically for now

## 2022-05-26 ENCOUNTER — TELEPHONE (OUTPATIENT)
Dept: INTERNAL MEDICINE | Facility: CLINIC | Age: 62
End: 2022-05-26

## 2022-05-26 DIAGNOSIS — I10 ESSENTIAL HYPERTENSION: ICD-10-CM

## 2022-05-26 DIAGNOSIS — E11.3293 TYPE 2 DIABETES MELLITUS WITH BOTH EYES AFFECTED BY MILD NONPROLIFERATIVE RETINOPATHY WITHOUT MACULAR EDEMA, WITHOUT LONG-TERM CURRENT USE OF INSULIN: ICD-10-CM

## 2022-05-26 DIAGNOSIS — E78.2 MIXED HYPERLIPIDEMIA: ICD-10-CM

## 2022-05-26 DIAGNOSIS — E55.9 VITAMIN D DEFICIENCY: ICD-10-CM

## 2022-05-26 DIAGNOSIS — Z12.5 SCREENING FOR PROSTATE CANCER: ICD-10-CM

## 2022-05-26 DIAGNOSIS — Z00.00 PE (PHYSICAL EXAM), ROUTINE: Primary | ICD-10-CM

## 2022-05-31 ENCOUNTER — LAB (OUTPATIENT)
Dept: LAB | Facility: HOSPITAL | Age: 62
End: 2022-05-31

## 2022-05-31 DIAGNOSIS — E55.9 VITAMIN D DEFICIENCY: ICD-10-CM

## 2022-05-31 DIAGNOSIS — E11.3293 TYPE 2 DIABETES MELLITUS WITH BOTH EYES AFFECTED BY MILD NONPROLIFERATIVE RETINOPATHY WITHOUT MACULAR EDEMA, WITHOUT LONG-TERM CURRENT USE OF INSULIN: ICD-10-CM

## 2022-05-31 DIAGNOSIS — I10 ESSENTIAL HYPERTENSION: ICD-10-CM

## 2022-05-31 DIAGNOSIS — E78.2 MIXED HYPERLIPIDEMIA: ICD-10-CM

## 2022-05-31 DIAGNOSIS — Z00.00 PE (PHYSICAL EXAM), ROUTINE: ICD-10-CM

## 2022-05-31 DIAGNOSIS — Z12.5 SCREENING FOR PROSTATE CANCER: ICD-10-CM

## 2022-05-31 LAB
25(OH)D3 SERPL-MCNC: 61 NG/ML (ref 30–100)
ALBUMIN SERPL-MCNC: 4.8 G/DL (ref 3.5–5.2)
ALBUMIN UR-MCNC: <1.2 MG/DL
ALBUMIN/GLOB SERPL: 2.1 G/DL
ALP SERPL-CCNC: 31 U/L (ref 39–117)
ALT SERPL W P-5'-P-CCNC: 17 U/L (ref 1–41)
ANION GAP SERPL CALCULATED.3IONS-SCNC: 13 MMOL/L (ref 5–15)
AST SERPL-CCNC: 16 U/L (ref 1–40)
BACTERIA UR QL AUTO: NORMAL /HPF
BASOPHILS # BLD AUTO: 0.09 10*3/MM3 (ref 0–0.2)
BASOPHILS NFR BLD AUTO: 1.4 % (ref 0–1.5)
BILIRUB SERPL-MCNC: 0.4 MG/DL (ref 0–1.2)
BILIRUB UR QL STRIP: NEGATIVE
BUN SERPL-MCNC: 20 MG/DL (ref 8–23)
BUN/CREAT SERPL: 17.7 (ref 7–25)
CALCIUM SPEC-SCNC: 10 MG/DL (ref 8.6–10.5)
CHLORIDE SERPL-SCNC: 102 MMOL/L (ref 98–107)
CHOLEST SERPL-MCNC: 205 MG/DL (ref 0–200)
CK SERPL-CCNC: 160 U/L (ref 20–200)
CLARITY UR: CLEAR
CO2 SERPL-SCNC: 23 MMOL/L (ref 22–29)
COLOR UR: YELLOW
CREAT SERPL-MCNC: 1.13 MG/DL (ref 0.76–1.27)
CREAT UR-MCNC: 78.9 MG/DL
DEPRECATED RDW RBC AUTO: 40.1 FL (ref 37–54)
EGFRCR SERPLBLD CKD-EPI 2021: 73.9 ML/MIN/1.73
EOSINOPHIL # BLD AUTO: 0.21 10*3/MM3 (ref 0–0.4)
EOSINOPHIL NFR BLD AUTO: 3.3 % (ref 0.3–6.2)
ERYTHROCYTE [DISTWIDTH] IN BLOOD BY AUTOMATED COUNT: 13.8 % (ref 12.3–15.4)
GLOBULIN UR ELPH-MCNC: 2.3 GM/DL
GLUCOSE SERPL-MCNC: 108 MG/DL (ref 65–99)
GLUCOSE UR STRIP-MCNC: ABNORMAL MG/DL
HBA1C MFR BLD: 6.5 % (ref 4.8–5.6)
HCT VFR BLD AUTO: 47.9 % (ref 37.5–51)
HDLC SERPL-MCNC: 32 MG/DL (ref 40–60)
HGB BLD-MCNC: 16.6 G/DL (ref 13–17.7)
HGB UR QL STRIP.AUTO: NEGATIVE
HYALINE CASTS UR QL AUTO: NORMAL /LPF
IMM GRANULOCYTES # BLD AUTO: 0.01 10*3/MM3 (ref 0–0.05)
IMM GRANULOCYTES NFR BLD AUTO: 0.2 % (ref 0–0.5)
KETONES UR QL STRIP: NEGATIVE
LDLC SERPL CALC-MCNC: 109 MG/DL (ref 0–100)
LDLC/HDLC SERPL: 3.08 {RATIO}
LEUKOCYTE ESTERASE UR QL STRIP.AUTO: NEGATIVE
LYMPHOCYTES # BLD AUTO: 3.19 10*3/MM3 (ref 0.7–3.1)
LYMPHOCYTES NFR BLD AUTO: 49.5 % (ref 19.6–45.3)
MCH RBC QN AUTO: 28.2 PG (ref 26.6–33)
MCHC RBC AUTO-ENTMCNC: 34.7 G/DL (ref 31.5–35.7)
MCV RBC AUTO: 81.5 FL (ref 79–97)
MICROALBUMIN/CREAT UR: NORMAL MG/G{CREAT}
MONOCYTES # BLD AUTO: 0.52 10*3/MM3 (ref 0.1–0.9)
MONOCYTES NFR BLD AUTO: 8.1 % (ref 5–12)
NEUTROPHILS NFR BLD AUTO: 2.42 10*3/MM3 (ref 1.7–7)
NEUTROPHILS NFR BLD AUTO: 37.5 % (ref 42.7–76)
NITRITE UR QL STRIP: NEGATIVE
NRBC BLD AUTO-RTO: 0 /100 WBC (ref 0–0.2)
PH UR STRIP.AUTO: 5.5 [PH] (ref 5–8)
PLATELET # BLD AUTO: 251 10*3/MM3 (ref 140–450)
PMV BLD AUTO: 10.4 FL (ref 6–12)
POTASSIUM SERPL-SCNC: 4.6 MMOL/L (ref 3.5–5.2)
PROT SERPL-MCNC: 7.1 G/DL (ref 6–8.5)
PROT UR QL STRIP: NEGATIVE
PSA SERPL-MCNC: 1.2 NG/ML (ref 0–4)
RBC # BLD AUTO: 5.88 10*6/MM3 (ref 4.14–5.8)
RBC # UR STRIP: NORMAL /HPF
REF LAB TEST METHOD: NORMAL
SODIUM SERPL-SCNC: 138 MMOL/L (ref 136–145)
SP GR UR STRIP: >=1.03 (ref 1–1.03)
SQUAMOUS #/AREA URNS HPF: NORMAL /HPF
TRIGL SERPL-MCNC: 372 MG/DL (ref 0–150)
TSH SERPL DL<=0.05 MIU/L-ACNC: 3.18 UIU/ML (ref 0.27–4.2)
UROBILINOGEN UR QL STRIP: ABNORMAL
VIT B12 BLD-MCNC: 309 PG/ML (ref 211–946)
VLDLC SERPL-MCNC: 64 MG/DL (ref 5–40)
WBC # UR STRIP: NORMAL /HPF
WBC NRBC COR # BLD: 6.44 10*3/MM3 (ref 3.4–10.8)

## 2022-05-31 PROCEDURE — 82043 UR ALBUMIN QUANTITATIVE: CPT

## 2022-05-31 PROCEDURE — 80061 LIPID PANEL: CPT

## 2022-05-31 PROCEDURE — 82570 ASSAY OF URINE CREATININE: CPT

## 2022-05-31 PROCEDURE — 82607 VITAMIN B-12: CPT

## 2022-05-31 PROCEDURE — 81001 URINALYSIS AUTO W/SCOPE: CPT

## 2022-05-31 PROCEDURE — 82550 ASSAY OF CK (CPK): CPT

## 2022-05-31 PROCEDURE — 82306 VITAMIN D 25 HYDROXY: CPT

## 2022-05-31 PROCEDURE — 83036 HEMOGLOBIN GLYCOSYLATED A1C: CPT

## 2022-05-31 PROCEDURE — G0103 PSA SCREENING: HCPCS

## 2022-05-31 PROCEDURE — 80050 GENERAL HEALTH PANEL: CPT

## 2022-06-06 NOTE — ASSESSMENT & PLAN NOTE
"Health maintenance - COVID19 vacc done, strongly rec 3rd dose booster pauline with taking care of grandbaby (pt declines); flu vacc 10/21; PVX 9/16; Prevnar 20 GIVEN TODAY; Td 2/20 (next Td due 2030), Shingrix and HAV done; CASSANDRA performed today; stable PSA 1.2; nl colonosc 9/20, repeat 2025 per Dr. Brandon; eye exam w/ Dr. Keith 2022 ((+) DM retinopathy), also with f/u Dr. Contreras to look at \"borderline pressures\" (attributed to thickened cornea per pt) - request notes; dental exam q6 mos; (+) seat belt use  "

## 2022-06-06 NOTE — ASSESSMENT & PLAN NOTE
Request eye exam notes and rec having Dr. Contreras comment on retinopathy at his 6-mon f/u visit; recent A1C improved 6.5; f/u in 3 mos

## 2022-06-06 NOTE — PROGRESS NOTES
"Chief Complaint   Patient presents with   • Annual Exam   • Diabetes   • Hyperlipidemia   • Hypertension       History of Present Illness  61 y.o.  gentleman presents for updated phys examination and f/u on DM, cholesterol, BP.  Fasting BGs in the 100s, nonfasting up to 180s.    Review of Systems  Denies headaches, visual changes, CP, palpitations, SOB, cough, abd pain, n/v/d, difficulty with urination, numbness/tingling, falls, mood changes, lightheadedness, hearing changes, rashes.    ROS (+) for right heel pain, worsened with pointing toes upward, comes and goes; no numbness/tingling.      All other ROS reviewed and negative.    Current Outpatient Medications:   •  aspirin 81 MG QD  •  Canagliflozin (Invokana) 300 MG QD  •  Cholecalciferol (VITAMIN D3) 5000 UNITS QD  •  EPINEPHrine (EPIPEN) 0.3 MG/0.3ML soln prn  •  fenofibric acid (TRILIPIX) 135 MG QD  •  icosapent ethyl (Vascepa) 1 g 2 BID  •  metFORMIN  MG 4 QD  •  Multiple Vitamins-Minerals QD  •  promethazine (PHENERGAN) 25 MG prn  •  Semaglutide, 1 MG/DOSE, (OZEMPIC) 2 MG/1.5ML 1mg weekly    VITALS:  /78   Pulse 75   Ht 174 cm (68.5\")   Wt 86.6 kg (191 lb)   SpO2 99%   BMI 28.62 kg/m²     Physical Exam  Vitals and nursing note reviewed.   Constitutional:       General: He is not in acute distress.     Appearance: Normal appearance. He is well-developed.   HENT:      Head: Normocephalic.      Right Ear: Tympanic membrane, ear canal and external ear normal.      Left Ear: Tympanic membrane, ear canal and external ear normal.      Nose: Nose normal.   Eyes:      Extraocular Movements: Extraocular movements intact.      Conjunctiva/sclera: Conjunctivae normal.      Pupils: Pupils are equal, round, and reactive to light.   Neck:      Vascular: No carotid bruit (bilaterally).   Cardiovascular:      Rate and Rhythm: Normal rate and regular rhythm.      Pulses:           Dorsalis pedis pulses are 2+ on the right side and 2+ on the left " side.      Heart sounds: Normal heart sounds.   Pulmonary:      Effort: Pulmonary effort is normal. No respiratory distress.      Breath sounds: Normal breath sounds. No wheezing.   Abdominal:      General: Bowel sounds are normal. There is no distension.      Palpations: Abdomen is soft. There is no mass.      Tenderness: There is no abdominal tenderness.   Genitourinary:     Comments: Chaperone declined by patient; normal external genitalia, no hemorrhoids, prostate smooth, nonenlarged, nontender, no nodules palpated    Musculoskeletal:         General: No swelling (bilat ankles FROM; no swelling at the right achilles tendon, nontender to palpation but reproducible discomfort with plantar flexion on right).      Cervical back: Normal range of motion and neck supple.      Right lower leg: No edema.      Left lower leg: No edema.      Right foot: No deformity.      Left foot: No deformity.   Feet:      Right foot:      Skin integrity: Skin integrity normal. No skin breakdown.      Left foot:      Skin integrity: Skin integrity normal. No skin breakdown.      Comments: Diabetic Foot Exam Performed and Monofilament Test Performed - intact BLE  Lymphadenopathy:      Cervical: No cervical adenopathy.   Skin:     General: Skin is warm and dry.      Findings: No rash.   Neurological:      Mental Status: He is alert and oriented to person, place, and time.      Cranial Nerves: No cranial nerve deficit.      Deep Tendon Reflexes: Reflexes normal.   Psychiatric:         Mood and Affect: Mood normal.         Behavior: Behavior normal.           LABS  Results for orders placed or performed in visit on 05/31/22   Comprehensive Metabolic Panel    Specimen: Blood   Result Value Ref Range    Glucose 108 (H) 65 - 99 mg/dL    BUN 20 8 - 23 mg/dL    Creatinine 1.13 0.76 - 1.27 mg/dL    Sodium 138 136 - 145 mmol/L    Potassium 4.6 3.5 - 5.2 mmol/L    Chloride 102 98 - 107 mmol/L    CO2 23.0 22.0 - 29.0 mmol/L    Calcium 10.0 8.6 -  10.5 mg/dL    Total Protein 7.1 6.0 - 8.5 g/dL    Albumin 4.80 3.50 - 5.20 g/dL    ALT (SGPT) 17 1 - 41 U/L    AST (SGOT) 16 1 - 40 U/L    Alkaline Phosphatase 31 (L) 39 - 117 U/L    Total Bilirubin 0.4 0.0 - 1.2 mg/dL    Globulin 2.3 gm/dL    A/G Ratio 2.1 g/dL    BUN/Creatinine Ratio 17.7 7.0 - 25.0    Anion Gap 13.0 5.0 - 15.0 mmol/L    eGFR 73.9 >60.0 mL/min/1.73   Lipid Panel    Specimen: Blood   Result Value Ref Range    Total Cholesterol 205 (H) 0 - 200 mg/dL    Triglycerides 372 (H) 0 - 150 mg/dL    HDL Cholesterol 32 (L) 40 - 60 mg/dL    LDL Cholesterol  109 (H) 0 - 100 mg/dL    VLDL Cholesterol 64 (H) 5 - 40 mg/dL    LDL/HDL Ratio 3.08    TSH    Specimen: Blood   Result Value Ref Range    TSH 3.180 0.270 - 4.200 uIU/mL   Microalbumin / Creatinine Urine Ratio - Urine, Clean Catch    Specimen: Urine, Clean Catch   Result Value Ref Range    Microalbumin/Creatinine Ratio      Creatinine, Urine 78.9 mg/dL    Microalbumin, Urine <1.2 mg/dL   Vitamin D 25 Hydroxy    Specimen: Blood   Result Value Ref Range    25 Hydroxy, Vitamin D 61.0 30.0 - 100.0 ng/ml   Hemoglobin A1c    Specimen: Blood   Result Value Ref Range    Hemoglobin A1C 6.50 (H) 4.80 - 5.60 %   Vitamin B12    Specimen: Blood   Result Value Ref Range    Vitamin B-12 309 211 - 946 pg/mL   PSA Screen    Specimen: Blood   Result Value Ref Range    PSA 1.200 0.000 - 4.000 ng/mL   CK    Specimen: Blood   Result Value Ref Range    Creatine Kinase 160 20 - 200 U/L   CBC Auto Differential    Specimen: Blood   Result Value Ref Range    WBC 6.44 3.40 - 10.80 10*3/mm3    RBC 5.88 (H) 4.14 - 5.80 10*6/mm3    Hemoglobin 16.6 13.0 - 17.7 g/dL    Hematocrit 47.9 37.5 - 51.0 %    MCV 81.5 79.0 - 97.0 fL    MCH 28.2 26.6 - 33.0 pg    MCHC 34.7 31.5 - 35.7 g/dL    RDW 13.8 12.3 - 15.4 %    RDW-SD 40.1 37.0 - 54.0 fl    MPV 10.4 6.0 - 12.0 fL    Platelets 251 140 - 450 10*3/mm3    Neutrophil % 37.5 (L) 42.7 - 76.0 %    Lymphocyte % 49.5 (H) 19.6 - 45.3 %    Monocyte %  8.1 5.0 - 12.0 %    Eosinophil % 3.3 0.3 - 6.2 %    Basophil % 1.4 0.0 - 1.5 %    Immature Grans % 0.2 0.0 - 0.5 %    Neutrophils, Absolute 2.42 1.70 - 7.00 10*3/mm3    Lymphocytes, Absolute 3.19 (H) 0.70 - 3.10 10*3/mm3    Monocytes, Absolute 0.52 0.10 - 0.90 10*3/mm3    Eosinophils, Absolute 0.21 0.00 - 0.40 10*3/mm3    Basophils, Absolute 0.09 0.00 - 0.20 10*3/mm3    Immature Grans, Absolute 0.01 0.00 - 0.05 10*3/mm3    nRBC 0.0 0.0 - 0.2 /100 WBC   Urinalysis without microscopic (no culture) - Urine, Clean Catch    Specimen: Urine, Clean Catch   Result Value Ref Range    Color, UA Yellow Yellow, Straw    Appearance, UA Clear Clear    pH, UA 5.5 5.0 - 8.0    Specific Gravity, UA >=1.030 1.005 - 1.030    Glucose, UA >=1000 mg/dL (3+) (A) Negative    Ketones, UA Negative Negative    Bilirubin, UA Negative Negative    Blood, UA Negative Negative    Protein, UA Negative Negative    Leuk Esterase, UA Negative Negative    Nitrite, UA Negative Negative    Urobilinogen, UA 0.2 E.U./dL 0.2 - 1.0 E.U./dL   Urinalysis, Microscopic Only - Urine, Clean Catch    Specimen: Urine, Clean Catch   Result Value Ref Range    RBC, UA 0-2 None Seen, 0-2 /HPF    WBC, UA 0-2 None Seen, 0-2 /HPF    Bacteria, UA None Seen None Seen /HPF    Squamous Epithelial Cells, UA 0-2 None Seen, 0-2 /HPF    Hyaline Casts, UA None Seen None Seen /LPF    Methodology Automated Microscopy      3/22 A1C 7.3, LDL 68      ECG 12 Lead    Date/Time: 6/7/2022 8:30 AM  Performed by: Violette Hylton MD  Authorized by: Violette Hylton MD   Comparison: compared with previous ECG from 6/4/2021  Similar to previous ECG  Rhythm: sinus rhythm  Rate: normal  BPM: 76  Conduction: conduction normal  ST Segments: ST segments normal  T Waves: T waves normal  T inversion: III  QRS axis: normal  Other findings: non-specific ST-T wave changes  Clinical impression comment: stable EKG              ASSESSMENT/PLAN    Diagnoses and all orders for this visit:    1. PE (physical  "exam), routine (Primary)  Assessment & Plan:  Health maintenance - COVID19 vacc done, strongly rec 3rd dose booster pauline with taking care of grandbaby (pt declines); flu vacc 10/21; PVX 9/16; Prevnar 20 GIVEN TODAY; Td 2/20 (next Td due 2030), Shingrix and HAV done; CASSANDRA performed today; stable PSA 1.2; nl colonosc 9/20, repeat 2025 per Dr. Brandon; eye exam w/ Dr. Keith 2022 ((+) DM retinopathy), also with f/u Dr. Contreras to look at \"borderline pressures\" (attributed to thickened cornea per pt) - request notes; dental exam q6 mos; (+) seat belt use    Orders:  -     Pneumococcal Conjugate Vaccine 20-Valent All    2. Type 2 diabetes mellitus with both eyes affected by mild nonproliferative retinopathy without macular edema, without long-term current use of insulin (HCC)  Assessment & Plan:  BG control improved with A1C 6.5; con Ozempic 1mg weekly #3mo, 3RF, met ER 500mg 4 QD #360, 03RF, invokana 300mg QD #90, 3RF; encouraged reg phys activity to decr insulin resistance, moderation in unhealthy starches/sweets; reminder needs annual DM eye exam; f/u A1C in 3 mos      Orders:  -     Hemoglobin A1c; Future  -     Canagliflozin (Invokana) 300 MG tablet tablet; Take 1 tablet by mouth Daily.  Dispense: 90 tablet; Refill: 3  -     metFORMIN ER (GLUCOPHAGE-XR) 500 MG 24 hr tablet; Take 4 tablets by mouth Daily With Breakfast.  Dispense: 360 tablet; Refill: 3  -     Semaglutide, 1 MG/DOSE, (OZEMPIC) 2 MG/1.5ML solution pen-injector; Inject 1 mg under the skin into the appropriate area as directed 1 (One) Time Per Week.  Dispense: 6 pen; Refill: 3    3. Hypertension  Assessment & Plan:  BP bord 134/78; no meds currently; goal < 130/80; f/u in 3 mos    Orders:  -     ECG 12 Lead    4. Hyperlipidemia  Assessment & Plan:  Lipids worsened with  (goal < 70),  (goal < 150), and low HDL 32 (goal > 40); cont fenofibrate 135mg QD #90, 3RF and vascepa 1gm 2 BID #360, 3RF; note extremely high 10-yr CVDrisk; decrease saturated " fats and cholesterol in the diet; repeat lipids in 3 mos - will need to initiate statin therapy if LDL not back to goal    The 10-year CVD risk score (D'Agostino, et al., 2008) is: 37.7%    Values used to calculate the score:      Age: 61 years      Sex: Male      Diabetic: Yes      Tobacco smoker: No      Systolic Blood Pressure: 134 mmHg      Is BP treated: No      HDL Cholesterol: 32 mg/dL      Total Cholesterol: 205 mg/dL      Orders:  -     Lipid Panel; Future  -     icosapent ethyl (Vascepa) 1 g capsule capsule; Take 2 g by mouth 2 (Two) Times a Day With Meals.  Dispense: 360 capsule; Refill: 2    5. Vitamin D deficiency  Assessment & Plan:  Bord high level 61; rec decr vit D supplement dose over the summer months      6. Nonproliferative retinopathy due to secondary diabetes (HCC)  Assessment & Plan:  Request eye exam notes and rec having Dr. 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 Achilles tendinitis, right leg  Comments:  rec gentle stretches, warm compresses, prn NSAIDs; f/u prn      FOLLOW-UP  RTC 3 mos with A1C, lipids (escribed)    Electronically signed by:    Violette Hylton MD, FACP  06/07/2022

## 2022-06-06 NOTE — ASSESSMENT & PLAN NOTE
Lipids worsened with  (goal < 70),  (goal < 150), and low HDL 32 (goal > 40); cont fenofibrate 135mg QD #90, 3RF and vascepa 1gm 2 BID #360, 3RF; note extremely high 10-yr CVDrisk; decrease saturated fats and cholesterol in the diet; repeat lipids in 3 mos - will need to initiate statin therapy if LDL not back to goal    The 10-year CVD risk score (JOSÉ MIGUEL'Agoino, et al., 2008) is: 37.7%    Values used to calculate the score:      Age: 61 years      Sex: Male      Diabetic: Yes      Tobacco smoker: No      Systolic Blood Pressure: 134 mmHg      Is BP treated: No      HDL Cholesterol: 32 mg/dL      Total Cholesterol: 205 mg/dL

## 2022-06-06 NOTE — ASSESSMENT & PLAN NOTE
BG control improved with A1C 6.5; con Ozempic 1mg weekly #3mo, 3RF, met ER 500mg 4 QD #360, 03RF, invokana 300mg QD #90, 3RF; encouraged reg phys activity to decr insulin resistance, moderation in unhealthy starches/sweets; reminder needs annual DM eye exam; f/u A1C in 3 mos     Genaro, your results were all normal.    Please let me know if you have any questions.    Mayelin Hernandez MD

## 2022-06-07 ENCOUNTER — OFFICE VISIT (OUTPATIENT)
Dept: INTERNAL MEDICINE | Facility: CLINIC | Age: 62
End: 2022-06-07

## 2022-06-07 VITALS
WEIGHT: 191 LBS | HEART RATE: 75 BPM | HEIGHT: 69 IN | OXYGEN SATURATION: 99 % | BODY MASS INDEX: 28.29 KG/M2 | SYSTOLIC BLOOD PRESSURE: 134 MMHG | DIASTOLIC BLOOD PRESSURE: 78 MMHG

## 2022-06-07 DIAGNOSIS — I10 ESSENTIAL HYPERTENSION: Chronic | ICD-10-CM

## 2022-06-07 DIAGNOSIS — Z00.00 PE (PHYSICAL EXAM), ROUTINE: Primary | Chronic | ICD-10-CM

## 2022-06-07 DIAGNOSIS — E78.2 MIXED HYPERLIPIDEMIA: Chronic | ICD-10-CM

## 2022-06-07 DIAGNOSIS — E13.3299 NONPROLIFERATIVE RETINOPATHY DUE TO SECONDARY DIABETES: Chronic | ICD-10-CM

## 2022-06-07 DIAGNOSIS — M76.61 ACHILLES TENDINITIS, RIGHT LEG: ICD-10-CM

## 2022-06-07 DIAGNOSIS — Z91.030 ALLERGY TO BEE STING: ICD-10-CM

## 2022-06-07 DIAGNOSIS — E55.9 VITAMIN D DEFICIENCY: Chronic | ICD-10-CM

## 2022-06-07 DIAGNOSIS — E11.3293 TYPE 2 DIABETES MELLITUS WITH BOTH EYES AFFECTED BY MILD NONPROLIFERATIVE RETINOPATHY WITHOUT MACULAR EDEMA, WITHOUT LONG-TERM CURRENT USE OF INSULIN: Chronic | ICD-10-CM

## 2022-06-07 PROCEDURE — 90471 IMMUNIZATION ADMIN: CPT | Performed by: INTERNAL MEDICINE

## 2022-06-07 PROCEDURE — 93000 ELECTROCARDIOGRAM COMPLETE: CPT | Performed by: INTERNAL MEDICINE

## 2022-06-07 PROCEDURE — 99396 PREV VISIT EST AGE 40-64: CPT | Performed by: INTERNAL MEDICINE

## 2022-06-07 PROCEDURE — 90677 PCV20 VACCINE IM: CPT | Performed by: INTERNAL MEDICINE

## 2022-06-07 RX ORDER — EPINEPHRINE 0.3 MG/.3ML
INJECTION SUBCUTANEOUS
Qty: 1 EACH | Refills: 1 | Status: SHIPPED | OUTPATIENT
Start: 2022-06-07

## 2022-06-07 RX ORDER — METFORMIN HYDROCHLORIDE 500 MG/1
2000 TABLET, EXTENDED RELEASE ORAL
Qty: 360 TABLET | Refills: 3 | Status: SHIPPED | OUTPATIENT
Start: 2022-06-07

## 2022-06-07 RX ORDER — ICOSAPENT ETHYL 1000 MG/1
2 CAPSULE ORAL 2 TIMES DAILY WITH MEALS
Qty: 360 CAPSULE | Refills: 2 | Status: SHIPPED | OUTPATIENT
Start: 2022-06-07

## 2022-06-07 RX ORDER — CANAGLIFLOZIN 300 MG/1
300 TABLET, FILM COATED ORAL DAILY
Qty: 90 TABLET | Refills: 3 | Status: SHIPPED | OUTPATIENT
Start: 2022-06-07 | End: 2023-03-15 | Stop reason: CLARIF

## 2022-06-07 NOTE — PROGRESS NOTES
Immunization  Immunization performed in left deltoid IM by Talia Freed MA. Patient tolerated the procedure well without complications.  06/07/22   Talia Freed MA

## 2022-06-13 PROBLEM — H40.051 OCULAR HYPERTENSION, RIGHT EYE: Status: ACTIVE | Noted: 2022-06-13

## 2022-06-14 ENCOUNTER — TELEPHONE (OUTPATIENT)
Dept: INTERNAL MEDICINE | Facility: CLINIC | Age: 62
End: 2022-06-14

## 2022-06-14 PROBLEM — H40.001: Status: ACTIVE | Noted: 2022-04-12

## 2022-06-14 NOTE — TELEPHONE ENCOUNTER
----- Message from Violette Hylton MD sent at 6/13/2022 11:33 PM EDT -----  Regarding: request records   Received eye exam records from Dr. Keith, part of note was cut off. It says he was referred to glaucoma specialist. Did he go? If so - whom did he see so we  can request records.

## 2022-07-08 ENCOUNTER — OFFICE VISIT (OUTPATIENT)
Dept: INTERNAL MEDICINE | Facility: CLINIC | Age: 62
End: 2022-07-08

## 2022-07-08 VITALS
OXYGEN SATURATION: 96 % | WEIGHT: 180 LBS | HEART RATE: 87 BPM | TEMPERATURE: 97.8 F | BODY MASS INDEX: 26.97 KG/M2 | DIASTOLIC BLOOD PRESSURE: 80 MMHG | SYSTOLIC BLOOD PRESSURE: 132 MMHG

## 2022-07-08 DIAGNOSIS — J02.9 PHARYNGITIS, UNSPECIFIED ETIOLOGY: Primary | ICD-10-CM

## 2022-07-08 DIAGNOSIS — J02.9 SORE THROAT: ICD-10-CM

## 2022-07-08 LAB
EXPIRATION DATE: NORMAL
EXPIRATION DATE: NORMAL
FLUAV AG UPPER RESP QL IA.RAPID: NOT DETECTED
FLUBV AG UPPER RESP QL IA.RAPID: NOT DETECTED
INTERNAL CONTROL: NORMAL
INTERNAL CONTROL: NORMAL
Lab: NORMAL
Lab: NORMAL
S PYO AG THROAT QL: NEGATIVE
SARS-COV-2 AG UPPER RESP QL IA.RAPID: NOT DETECTED

## 2022-07-08 PROCEDURE — 99214 OFFICE O/P EST MOD 30 MIN: CPT | Performed by: NURSE PRACTITIONER

## 2022-07-08 PROCEDURE — 87880 STREP A ASSAY W/OPTIC: CPT | Performed by: NURSE PRACTITIONER

## 2022-07-08 PROCEDURE — 87428 SARSCOV & INF VIR A&B AG IA: CPT | Performed by: NURSE PRACTITIONER

## 2022-07-08 RX ORDER — SEMAGLUTIDE 1.34 MG/ML
INJECTION, SOLUTION SUBCUTANEOUS
COMMUNITY
Start: 2022-06-20 | End: 2022-07-18 | Stop reason: SDUPTHER

## 2022-07-08 RX ORDER — PENICILLIN V POTASSIUM 500 MG/1
500 TABLET ORAL 3 TIMES DAILY
Qty: 30 TABLET | Refills: 0 | Status: SHIPPED | OUTPATIENT
Start: 2022-07-08 | End: 2022-07-18

## 2022-07-08 RX ORDER — LIDOCAINE HYDROCHLORIDE 20 MG/ML
SOLUTION OROPHARYNGEAL
COMMUNITY
Start: 2022-07-06 | End: 2022-07-18

## 2022-07-08 NOTE — PROGRESS NOTES
Follow Up Office Visit      Date: 2022   Patient Name: Bradley Ortiz  : 1960   MRN: [id number]     Chief Complaint:    Chief Complaint   Patient presents with   • Sore Throat     Pt has been having symptoms since Tuesday ().    • Earache   • Fever   • Nasal Congestion       History of Present Illness: Bradley Ortiz is a 61 y.o. male who is here today for a sick visit.  He reports that on Tuesday (22) he began feeling poorly complaining of sore throat, painful swallowing, earache, congestion, runny nose, and productive cough.  He was evaluated at urgent care Wednesday PM, and was given a prescription for viscous lidocaine. He continues to feel poorly and reports symptoms have been getting progressively worse. Yesterday he developed ageusia and anosmia. He has been treating with over-the-counter cold/ allergy medications including coricidin and utilizing supportive measures such as humidification, honey, and tea- all with minimal to no relief. He is unsure if he has been running a fever or not as he has been alternating acetaminophen and ibuprofen consistently since Tuesday.       Subjective      Review of Systems:   Review of Systems   Constitutional: Positive for appetite change (decreased) and fatigue. Negative for chills, diaphoresis and fever.   HENT: Positive for congestion, ear pain, postnasal drip, rhinorrhea, sinus pressure, sneezing, sore throat and trouble swallowing (and odynophagia). Negative for dental problem, ear discharge, facial swelling and hearing loss.         Denies any dental pain/ infection   Eyes: Negative for pain, redness and itching.   Respiratory: Positive for cough (productive). Negative for shortness of breath and wheezing.    Cardiovascular: Negative for chest pain.   Gastrointestinal: Negative for abdominal pain, constipation, diarrhea, nausea and vomiting.   Endocrine: Negative for cold intolerance, heat intolerance, polydipsia and polyuria.    Musculoskeletal: Negative for neck pain and neck stiffness (no rigidity ).   Skin: Negative for rash.   Allergic/Immunologic: Negative for environmental allergies and food allergies.   Neurological: Negative for dizziness, weakness, light-headedness and headache.   Hematological: Negative for adenopathy.       I have reviewed the patients family history, social history, past medical history, past surgical history and have updated it as appropriate.     Medications:     Current Outpatient Medications:   •  aspirin 81 MG tablet, Take 1 tablet by mouth daily., Disp: , Rfl:   •  Canagliflozin (Invokana) 300 MG tablet tablet, Take 1 tablet by mouth Daily., Disp: 90 tablet, Rfl: 3  •  Cholecalciferol (VITAMIN D3) 5000 UNITS capsule capsule, Take 5,000 Units by mouth Daily., Disp: , Rfl:   •  EPINEPHrine (EPIPEN) 0.3 MG/0.3ML solution auto-injector injection, USE AS DIRECTED, Disp: 1 each, Rfl: 1  •  fenofibric acid (TRILIPIX) 135 MG capsule delayed-release delayed release capsule, Take 1 capsule by mouth Daily., Disp: 90 capsule, Rfl: 3  •  glucose blood (OneTouch Verio) test strip, Use as instructed, Disp: 100 each, Rfl: 3  •  icosapent ethyl (Vascepa) 1 g capsule capsule, Take 2 g by mouth 2 (Two) Times a Day With Meals., Disp: 360 capsule, Rfl: 2  •  Lidocaine Viscous HCl (XYLOCAINE) 2 % solution, , Disp: , Rfl:   •  metFORMIN ER (GLUCOPHAGE-XR) 500 MG 24 hr tablet, Take 4 tablets by mouth Daily With Breakfast., Disp: 360 tablet, Rfl: 3  •  Multiple Vitamins-Minerals (MULTIVITAMIN ADULT PO), Take  by mouth Daily., Disp: , Rfl:   •  OneTouch Delica Lancets 33G misc, 1 each 3 (Three) Times a Day., Disp: 100 each, Rfl: 3  •  Ozempic, 1 MG/DOSE, 4 MG/3ML solution pen-injector, , Disp: , Rfl:   •  Semaglutide, 1 MG/DOSE, (OZEMPIC) 2 MG/1.5ML solution pen-injector, Inject 1 mg under the skin into the appropriate area as directed 1 (One) Time Per Week., Disp: 6 pen, Rfl: 3  •  penicillin v potassium (VEETID) 500 MG tablet,  Take 1 tablet by mouth 3 (Three) Times a Day for 10 days., Disp: 30 tablet, Rfl: 0    Allergies:   Allergies   Allergen Reactions   • Bee Venom Anaphylaxis     Passed out, tingling in throat, right hand swelling (ER 8/4/19)   • Triamterene Other (See Comments)     Hypercalcemia       Objective     Physical Exam: Please see above  Vital Signs:   Vitals:    07/08/22 1022   BP: 132/80   Pulse: 87   Temp: 97.8 °F (36.6 °C)   SpO2: 96%   Weight: 81.6 kg (180 lb)     Body mass index is 26.97 kg/m².    Physical Exam  Vitals and nursing note reviewed.   Constitutional:       General: He is not in acute distress.     Appearance: Normal appearance. He is not ill-appearing, toxic-appearing or diaphoretic.   HENT:      Head: Normocephalic and atraumatic.      Right Ear: Tympanic membrane, ear canal and external ear normal.      Left Ear: Tympanic membrane, ear canal and external ear normal.      Nose: Nose normal.      Right Sinus: No maxillary sinus tenderness or frontal sinus tenderness.      Left Sinus: No maxillary sinus tenderness or frontal sinus tenderness.      Mouth/Throat:      Lips: Pink.      Mouth: Mucous membranes are moist.      Pharynx: Oropharyngeal exudate and posterior oropharyngeal erythema present.   Eyes:      Extraocular Movements: Extraocular movements intact.      Conjunctiva/sclera: Conjunctivae normal.      Pupils: Pupils are equal, round, and reactive to light.   Cardiovascular:      Rate and Rhythm: Normal rate and regular rhythm.      Heart sounds: Normal heart sounds.   Pulmonary:      Effort: Pulmonary effort is normal. No respiratory distress.      Breath sounds: Normal breath sounds. No wheezing.   Musculoskeletal:      Cervical back: Neck supple. No rigidity or tenderness.   Lymphadenopathy:      Cervical: Cervical adenopathy present.   Skin:     General: Skin is warm and dry.      Capillary Refill: Capillary refill takes less than 2 seconds.   Neurological:      General: No focal deficit  present.      Mental Status: He is alert and oriented to person, place, and time.   Psychiatric:         Mood and Affect: Mood normal.         Behavior: Behavior normal.         Thought Content: Thought content normal.         Judgment: Judgment normal.         Procedures    Results:     Labs:   Previous labs reviewed    Assessment / Plan      Assessment/Plan:   Diagnoses and all orders for this visit:    1. Pharyngitis, unspecified etiology (Primary)  -     penicillin v potassium (VEETID) 500 MG tablet; Take 1 tablet by mouth 3 (Three) Times a Day for 10 days.  Dispense: 30 tablet; Refill: 0, complete entire course  -continue OTC supportive meds PRN. Avoid decongestants.   -call for worsening symptoms or no improvement   -throw away toothbrush after abx complete   2. Sore throat  -     POCT SARS-CoV-2 Antigen JAVIER + Flu  -     POCT rapid strep A         Follow Up:   Return if symptoms worsen or fail to improve.    AQUILES Diaz  [facility] [place]

## 2022-07-11 PROCEDURE — U0004 COV-19 TEST NON-CDC HGH THRU: HCPCS | Performed by: NURSE PRACTITIONER

## 2022-07-14 ENCOUNTER — PATIENT MESSAGE (OUTPATIENT)
Dept: INTERNAL MEDICINE | Facility: CLINIC | Age: 62
End: 2022-07-14

## 2022-07-14 DIAGNOSIS — H91.93 BILATERAL HEARING LOSS, UNSPECIFIED HEARING LOSS TYPE: Primary | ICD-10-CM

## 2022-07-18 ENCOUNTER — OFFICE VISIT (OUTPATIENT)
Dept: INTERNAL MEDICINE | Facility: CLINIC | Age: 62
End: 2022-07-18

## 2022-07-18 ENCOUNTER — TELEPHONE (OUTPATIENT)
Dept: INTERNAL MEDICINE | Facility: CLINIC | Age: 62
End: 2022-07-18

## 2022-07-18 ENCOUNTER — PATIENT MESSAGE (OUTPATIENT)
Dept: INTERNAL MEDICINE | Facility: CLINIC | Age: 62
End: 2022-07-18

## 2022-07-18 VITALS
HEIGHT: 68 IN | BODY MASS INDEX: 28.34 KG/M2 | DIASTOLIC BLOOD PRESSURE: 82 MMHG | HEART RATE: 68 BPM | WEIGHT: 187 LBS | OXYGEN SATURATION: 100 % | SYSTOLIC BLOOD PRESSURE: 132 MMHG

## 2022-07-18 DIAGNOSIS — E11.3293 TYPE 2 DIABETES MELLITUS WITH BOTH EYES AFFECTED BY MILD NONPROLIFERATIVE RETINOPATHY WITHOUT MACULAR EDEMA, WITHOUT LONG-TERM CURRENT USE OF INSULIN: Chronic | ICD-10-CM

## 2022-07-18 DIAGNOSIS — H91.93 BILATERAL HEARING LOSS, UNSPECIFIED HEARING LOSS TYPE: Primary | ICD-10-CM

## 2022-07-18 DIAGNOSIS — J30.1 SEASONAL ALLERGIC RHINITIS DUE TO POLLEN: ICD-10-CM

## 2022-07-18 PROCEDURE — 99214 OFFICE O/P EST MOD 30 MIN: CPT | Performed by: INTERNAL MEDICINE

## 2022-07-18 RX ORDER — PREDNISONE 10 MG/1
TABLET ORAL
Qty: 30 TABLET | Refills: 0 | Status: SHIPPED | OUTPATIENT
Start: 2022-07-18 | End: 2022-07-30

## 2022-07-18 NOTE — PROGRESS NOTES
"Chief Complaint   Patient presents with   • Hearing Problem     Both ears since having a sinus infection, throat infection.       History of Present Illness  61 y.o.  gentleman, accompanied by his wife, presents for further eval of persistent bilat hearing loss since cold sxs. HPI started 7/6/22 with sore throat. Went to [facility], was neg for strep, and RX'd lidocaine. 2 days later (Fri), still with sore throat and pain with swallowing, he came to our office. Throat looked red, strep was still neg, and he was Rx'd PCN.  Sore throat went away but then he developed nasal congestion, earache, hearing loss, and drainage from both eyes causing blurry vision. He went to [facility] again and was RX'd zpak, cipro eye drops, and medrol.     Still has mild residual nasal congestion, minimal cough and chest congestion but earache and eye drainage has resolved. Has no fevers. Hearing loss remains persistent bilaterally, worsened with various sounds.     Reports BGs bumped a little with medrol, which he just finished yesterday. Medrol did not help hearing loss.    Review of Systems  ROS (+) for bilat hearing loss after throat and sinus infection. Notes resolution of earache. ROS (+) for mild nasal congestion and mild cough without fevers/chills, sore throat, n/v, SOB. All other ROS reviewed and negative.    Current Outpatient Medications:   •  aspirin 81 MG QD  •  azelastine (ASTEPRO) 0.15 % solution nasal spray AD  •  Canagliflozin (Invokana) 300 MG QD  •  Cholecalciferol (VITAMIN D3) 5000 UNITS QD  •  EPINEPHrine (EPIPEN) 0.3 MG/0.3ML solution prn  •  fenofibric acid (TRILIPIX) 135 MG QD  •  icosapent ethyl (Vascepa) 1 g 2 BID  •  metFORMIN ER (GLUCOPHAGE-XR) 500 MG4 QD  •  Multiple Vitamins-Minerals QD  •  Ozempic, 1 MG/DOSE, 4 MG/3ML solution 1mg weekly      VITALS:  /82   Pulse 68   Ht 172.7 cm (68\")   Wt 84.8 kg (187 lb)   SpO2 100%   BMI 28.43 kg/m²     Physical Exam  Vitals and nursing note reviewed. "   Constitutional:       General: He is not in acute distress.     Appearance: Normal appearance. He is not ill-appearing.   HENT:      Right Ear: Ear canal and external ear normal. There is no impacted cerumen.      Left Ear: Ear canal and external ear normal. There is no impacted cerumen.      Ears:      Comments: Hearing to finger rubbing absent bilaterally; minimal streaky erythema at superior aspects of bilat TMs; nl light reflex, no bulging, no significant purulence behind bilat TMs     Nose: Congestion (swollen nasal mucosa bilaterally without erythema, no purulent drainage) present.      Mouth/Throat:      Pharynx: No oropharyngeal exudate or posterior oropharyngeal erythema.      Comments: Posterior o/p drainage without erythema or exudate  Eyes:      Extraocular Movements: Extraocular movements intact.      Conjunctiva/sclera: Conjunctivae normal.   Pulmonary:      Effort: Pulmonary effort is normal. No respiratory distress.   Neurological:      Mental Status: He is alert and oriented to person, place, and time. Mental status is at baseline.   Psychiatric:         Mood and Affect: Mood normal.         Behavior: Behavior normal.         LABS  Results for orders placed or performed during the hospital encounter of 07/11/22   COVID-19 PCR, [facility], NP SWAB IN [facility] VIRAL TRANSPORT MEDIA/ORAL SWISH 24-30 HR TAT - Swab, Nasopharynx    Specimen: Nasopharynx; Swab   Result Value Ref Range    SARS-CoV-2 CARISA Not Detected Not Detected     5/31/22 A1C 6.5,     ASSESSMENT/PLAN    Diagnoses and all orders for this visit:    1. Bilateral hearing loss, unspecified hearing loss type (Primary)  Comments:  consider labyrinthitis after recent URI; 2 wk duration, therefore refer to ENT; RX pred taper #30, 0RF to take between now and ENT; also rec flonase  Orders:  -     predniSONE (DELTASONE) 10 MG tablet; 4 PO QD for 3 days, then 3 PO QD for 3 days, then 2 PO QD for 3 days, then 1 PO QD for 3 days, then stop   Dispense: 30 tablet; Refill: 0    2. Allergic rhinitis  Assessment & Plan:  rec addition of flonase 2 spr/nostril QD - reviewed correct use      3. Type 2 diabetes mellitus with both eyes affected by mild nonproliferative retinopathy without macular edema, without long-term current use of insulin (HCC)  Assessment & Plan:  Discussed pred taper will transiently elevate BGs; cont metformin, invokana, and ozempic as RX'd f/u A1C in 2 mos as scheduled        FOLLOW-UP  1. Health maintenance - COVID vacc done, but strongly recommended proceeding with 3rd dose booster  2. RTC 9/9/22 as scheduled with A1C and lipids (prev escribed)    Electronically signed by:    Violette Hylton MD, FACP  07/18/2022

## 2022-07-18 NOTE — ASSESSMENT & PLAN NOTE
Discussed pred taper will transiently elevate BGs; cont metformin, invokana, and ozempic as RX'd f/u A1C in 2 mos as scheduled

## 2022-07-18 NOTE — TELEPHONE ENCOUNTER
Spoke lynne/ Adina @ [facility]; that is their first available, this coming Friday 7/22/2022.  He is welcome to check back for a cancellation; but the next appointment they have is not until 8/5/2022.

## 2022-07-18 NOTE — TELEPHONE ENCOUNTER
----- Message from Violette Hylton MD sent at 7/18/2022 12:51 PM EDT -----  Regarding: FW: Referral for [facility]   Can you see to get him an appointment this week? Worried about his hearing loss, ongoing for 2 weeks already, not better with steroids; thanks  ----- Message -----  From: Talia Freed MA  Sent: 7/18/2022  12:11 PM EDT  To: Violette Hylton MD  Subject: FW: Referral for [facility]                            ----- Message -----  From: Bradley Ortiz  Sent: 7/18/2022  10:50 AM EDT  To: Deejay Delgadillo [facility]  Subject: Referral for [facility]                              Dear Dr. Hylton, the soonest appointment available to [facility] is this Friday 7/22/2022 at 9:00.  This will be in 4 days versus the 2 to 3 days you recommended.  Just wanted you to know.    Thank you.

## 2022-07-31 DIAGNOSIS — E78.2 MIXED HYPERLIPIDEMIA: ICD-10-CM

## 2022-08-01 RX ORDER — FENOFIBRIC ACID 135 MG/1
CAPSULE, DELAYED RELEASE ORAL
Qty: 90 CAPSULE | Refills: 0 | Status: SHIPPED | OUTPATIENT
Start: 2022-08-01 | End: 2022-11-07

## 2022-09-07 ENCOUNTER — LAB (OUTPATIENT)
Dept: LAB | Facility: HOSPITAL | Age: 62
End: 2022-09-07

## 2022-09-07 DIAGNOSIS — E78.2 MIXED HYPERLIPIDEMIA: Chronic | ICD-10-CM

## 2022-09-07 DIAGNOSIS — E11.3293 TYPE 2 DIABETES MELLITUS WITH BOTH EYES AFFECTED BY MILD NONPROLIFERATIVE RETINOPATHY WITHOUT MACULAR EDEMA, WITHOUT LONG-TERM CURRENT USE OF INSULIN: Chronic | ICD-10-CM

## 2022-09-07 LAB
CHOLEST SERPL-MCNC: 200 MG/DL (ref 0–200)
HBA1C MFR BLD: 6.5 % (ref 4.8–5.6)
HDLC SERPL-MCNC: 32 MG/DL (ref 40–60)
LDLC SERPL CALC-MCNC: 120 MG/DL (ref 0–100)
LDLC/HDLC SERPL: 3.54 {RATIO}
TRIGL SERPL-MCNC: 273 MG/DL (ref 0–150)
VLDLC SERPL-MCNC: 48 MG/DL (ref 5–40)

## 2022-09-07 PROCEDURE — 80061 LIPID PANEL: CPT

## 2022-09-07 PROCEDURE — 83036 HEMOGLOBIN GLYCOSYLATED A1C: CPT

## 2022-09-09 ENCOUNTER — OFFICE VISIT (OUTPATIENT)
Dept: INTERNAL MEDICINE | Facility: CLINIC | Age: 62
End: 2022-09-09

## 2022-09-09 VITALS
OXYGEN SATURATION: 98 % | DIASTOLIC BLOOD PRESSURE: 82 MMHG | TEMPERATURE: 97 F | HEIGHT: 68 IN | WEIGHT: 190 LBS | BODY MASS INDEX: 28.79 KG/M2 | SYSTOLIC BLOOD PRESSURE: 132 MMHG | HEART RATE: 80 BPM

## 2022-09-09 DIAGNOSIS — I10 ESSENTIAL HYPERTENSION: Chronic | ICD-10-CM

## 2022-09-09 DIAGNOSIS — U07.1 COVID-19 VIRUS INFECTION: ICD-10-CM

## 2022-09-09 DIAGNOSIS — E78.2 MIXED HYPERLIPIDEMIA: Chronic | ICD-10-CM

## 2022-09-09 DIAGNOSIS — E11.3293 TYPE 2 DIABETES MELLITUS WITH BOTH EYES AFFECTED BY MILD NONPROLIFERATIVE RETINOPATHY WITHOUT MACULAR EDEMA, WITHOUT LONG-TERM CURRENT USE OF INSULIN: Primary | Chronic | ICD-10-CM

## 2022-09-09 PROCEDURE — 99214 OFFICE O/P EST MOD 30 MIN: CPT | Performed by: INTERNAL MEDICINE

## 2022-09-09 RX ORDER — ROSUVASTATIN CALCIUM 10 MG/1
10 TABLET, COATED ORAL DAILY
Qty: 30 TABLET | Refills: 3 | Status: SHIPPED | OUTPATIENT
Start: 2022-09-09 | End: 2022-12-02 | Stop reason: SDUPTHER

## 2022-09-09 NOTE — PROGRESS NOTES
"Chief Complaint   Patient presents with   • Follow-up     Lab work   • Hypertension   • Hyperlipidemia       History of Present Illness  61 y.o.  gentleman presents for DM, cholesterol, and BP follow-up. Home BGs have been fasting 110-120s, nonfasting up to 180s.    Is just getting over COVID19 infection from last Thursday, only mild sinus sxs currently (this is 2nd time he has had COVID19.)    Review of Systems  Denies visual changes, CP, SOB, abd pain.  All other ROS reviewed and negative.      Current Outpatient Medications:   •  aspirin 81 MG QD  •  azelastine (ASTEPRO) 0.15 % solution nasal spray AD  •  Canagliflozin (Invokana) 300 MG QD  •  Cholecalciferol (VITAMIN D3) 5000 UNITS QD  •  EPINEPHrine (EPIPEN) 0.3 MG/0.3ML AD  •  fenofibric acid (TRILIPIX) 135 MG QD  •  icosapent ethyl (Vascepa) 1 g 2 ID  •  metFORMIN ER  500 MG 4 QD  •  Multiple Vitamins-Minerals QD  •  Semaglutide, 1 MG/DOSE, (OZEMPIC) 2 MG/1.5ML 1mg weekly      VITALS:  /82   Pulse 80   Temp 97 °F (36.1 °C)   Ht 172.7 cm (68\")   Wt 86.2 kg (190 lb)   SpO2 98%   BMI 28.89 kg/m²     Physical Exam  Vitals and nursing note reviewed.   Constitutional:       General: He is not in acute distress.     Appearance: Normal appearance. He is not ill-appearing.   Eyes:      Extraocular Movements: Extraocular movements intact.      Conjunctiva/sclera: Conjunctivae normal.   Pulmonary:      Effort: Pulmonary effort is normal. No respiratory distress.   Neurological:      Mental Status: He is alert and oriented to person, place, and time. Mental status is at baseline.   Psychiatric:         Mood and Affect: Mood normal.         Behavior: Behavior normal.         LABS  Results for orders placed or performed in visit on 09/07/22   Lipid Panel    Specimen: Blood   Result Value Ref Range    Total Cholesterol 200 0 - 200 mg/dL    Triglycerides 273 (H) 0 - 150 mg/dL    HDL Cholesterol 32 (L) 40 - 60 mg/dL    LDL Cholesterol  120 (H) 0 - 100 " mg/dL    VLDL Cholesterol 48 (H) 5 - 40 mg/dL    LDL/HDL Ratio 3.54    Hemoglobin A1c    Specimen: Blood   Result Value Ref Range    Hemoglobin A1C 6.50 (H) 4.80 - 5.60 %         ASSESSMENT/PLAN    Diagnoses and all orders for this visit:    1. Type 2 diabetes mellitus with both eyes affected by mild nonproliferative retinopathy without macular edema, without long-term current use of insulin (HCC) (Primary)  Assessment & Plan:  BG control unchanged with A1C 6.5; cont invokana 300mg QD, met ER 500mg 4 QD, and ozempic 1mg weekly; encouraged reg phys activity to decr insulin resistance, moderation in unhealthy starches/sweets; f/u A1C in 3 mos      Orders:  -     Hemoglobin A1c; Future    2. Hyperlipidemia  Assessment & Plan:  Lipids worsened with  and goal < 70; note also very elevated 10-yr CVD risk 36%; reviewed med options, goals, side effects, and risks; patient will proceed with rosuvastatin 10mg QD #30, 3RF; decrease saturated fats and cholesterol in the diet; repeat lipids in 3 mos      The 10-year CVD risk score (JOSÉ MIGUEL'Agoino, et al., 2008) is: 36%    Values used to calculate the score:      Age: 61 years      Sex: Male      Diabetic: Yes      Tobacco smoker: No      Systolic Blood Pressure: 132 mmHg      Is BP treated: No      HDL Cholesterol: 32 mg/dL      Total Cholesterol: 200 mg/dL        Orders:  -     Lipid Panel; Future  -     Hepatic Function Panel; Future  -     CK; Future  -     rosuvastatin (CRESTOR) 10 MG tablet; Take 1 tablet by mouth Daily.  Dispense: 30 tablet; Refill: 3    3. Hypertension  Assessment & Plan:  BP bord/stable 132/82; no meds currently; goal < 130/80      4. COVID-19 virus infection  Assessment & Plan:  Symptoms mostly resolved from infection last week; co-worker also had it; rec flu vacc in the next few weeks and then new COVID19 vaccine in 1 month        FOLLOW-UP  1. Health maintenance - COVID19 vacc done, but no booster; rec flu vaccine soon and new COVID19 vacc when  available  2. RTC 3 mos with lipids, LFTs, CPK, A1C after starting rosuvastatin 10mg QD    Electronically signed by:    Violette Hylton MD, FACP  09/09/2022

## 2022-09-09 NOTE — ASSESSMENT & PLAN NOTE
Lipids worsened with  and goal < 70; note also very elevated 10-yr CVD risk 36%; reviewed med options, goals, side effects, and risks; patient will proceed with rosuvastatin 10mg QD #30, 3RF; decrease saturated fats and cholesterol in the diet; repeat lipids in 3 mos      The 10-year CVD risk score (Linda et al., 2008) is: 36%    Values used to calculate the score:      Age: 61 years      Sex: Male      Diabetic: Yes      Tobacco smoker: No      Systolic Blood Pressure: 132 mmHg      Is BP treated: No      HDL Cholesterol: 32 mg/dL      Total Cholesterol: 200 mg/dL

## 2022-09-09 NOTE — ASSESSMENT & PLAN NOTE
BG control unchanged with A1C 6.5; cont invokana 300mg QD, met ER 500mg 4 QD, and ozempic 1mg weekly; encouraged reg phys activity to decr insulin resistance, moderation in unhealthy starches/sweets; f/u A1C in 3 mos

## 2022-09-09 NOTE — ASSESSMENT & PLAN NOTE
Symptoms mostly resolved from infection last week; co-worker also had it; rec flu vacc in the next few weeks and then new COVID19 vaccine in 1 month

## 2022-11-07 DIAGNOSIS — E78.2 MIXED HYPERLIPIDEMIA: ICD-10-CM

## 2022-11-07 RX ORDER — FENOFIBRIC ACID 135 MG/1
CAPSULE, DELAYED RELEASE ORAL
Qty: 90 CAPSULE | Refills: 0 | Status: SHIPPED | OUTPATIENT
Start: 2022-11-07 | End: 2023-02-23

## 2022-11-29 ENCOUNTER — LAB (OUTPATIENT)
Dept: LAB | Facility: HOSPITAL | Age: 62
End: 2022-11-29

## 2022-11-29 DIAGNOSIS — E11.3293 TYPE 2 DIABETES MELLITUS WITH BOTH EYES AFFECTED BY MILD NONPROLIFERATIVE RETINOPATHY WITHOUT MACULAR EDEMA, WITHOUT LONG-TERM CURRENT USE OF INSULIN: Chronic | ICD-10-CM

## 2022-11-29 DIAGNOSIS — E78.2 MIXED HYPERLIPIDEMIA: Chronic | ICD-10-CM

## 2022-11-29 PROBLEM — E78.1 HYPERTRIGLYCERIDEMIA: Status: ACTIVE | Noted: 2022-11-29

## 2022-11-29 LAB
ALBUMIN SERPL-MCNC: 4.6 G/DL (ref 3.5–5.2)
ALP SERPL-CCNC: 37 U/L (ref 39–117)
ALT SERPL W P-5'-P-CCNC: 25 U/L (ref 1–41)
AST SERPL-CCNC: 20 U/L (ref 1–40)
BILIRUB CONJ SERPL-MCNC: <0.2 MG/DL (ref 0–0.3)
BILIRUB INDIRECT SERPL-MCNC: ABNORMAL MG/DL
BILIRUB SERPL-MCNC: 0.4 MG/DL (ref 0–1.2)
CHOLEST SERPL-MCNC: 205 MG/DL (ref 0–200)
CK SERPL-CCNC: 106 U/L (ref 20–200)
HBA1C MFR BLD: 7.7 % (ref 4.8–5.6)
HDLC SERPL-MCNC: 35 MG/DL (ref 40–60)
LDLC SERPL CALC-MCNC: 78 MG/DL (ref 0–100)
LDLC/HDLC SERPL: 1.56 {RATIO}
PROT SERPL-MCNC: 7.5 G/DL (ref 6–8.5)
TRIGL SERPL-MCNC: 577 MG/DL (ref 0–150)
VLDLC SERPL-MCNC: 92 MG/DL (ref 5–40)

## 2022-11-29 PROCEDURE — 80061 LIPID PANEL: CPT

## 2022-11-29 PROCEDURE — 82550 ASSAY OF CK (CPK): CPT

## 2022-11-29 PROCEDURE — 80076 HEPATIC FUNCTION PANEL: CPT

## 2022-11-29 PROCEDURE — 83036 HEMOGLOBIN GLYCOSYLATED A1C: CPT

## 2022-11-30 PROBLEM — E78.1 HYPERTRIGLYCERIDEMIA: Chronic | Status: ACTIVE | Noted: 2022-11-29

## 2022-12-01 NOTE — ASSESSMENT & PLAN NOTE
Lipids with improved LDL 78 with goal < 70 but significantly worsened TGs 577 (goal < 150); with increased 10-yr CVD risk, will go ahead and incr rosuvastatin 20mg QD #90, 1RF; cont fenofibrate 135mg QD and vascepa 1gm 2 BID with special attention to decreased saturated fats and fried foods; f/u lipids in 3 mos    The 10-year CVD risk score (JOSÉ MIGUEL'Megino, et al., 2008) is: 35.9%    Values used to calculate the score:      Age: 62 years      Sex: Male      Diabetic: Yes      Tobacco smoker: No      Systolic Blood Pressure: 132 mmHg      Is BP treated: No      HDL Cholesterol: 35 mg/dL      Total Cholesterol: 205 mg/dL

## 2022-12-01 NOTE — ASSESSMENT & PLAN NOTE
Lipids with worsened TGs 577 with goal < 150; remains on fenofibrate 135mg QD and vascepa 1gm 2 BID; encouraged patient to be more strict with low fat/cholesterol diet; f/u lipids in 3 mos

## 2022-12-01 NOTE — PROGRESS NOTES
"Chief Complaint   Patient presents with   • Diabetes   • Nail Problem     Left foot big toe       History of Present Illness  62 y.o.  gentleman presents for f/u on DM, BP, and cholesterol. Not checking BGs at home.    Expects improvements in sugars because of ability to exercise more after he retires in 1 week. Also enjoying granddaughter (they live in the basement, looking for house).    Complains of intermittent episodes of dizziness, such as when turning in bed. Notes h/o vertigo requiring vertigo clinic.     Reports thickening of left big toe, starting to get better, not assoc'd with pain or cracking.    No s/e with addition of rosuvastatin except question about dizziness.    Review of Systems  Denies visual changes, CP, palpitations, SOB.  ROS (+) for left big toenail thickening changes a noted. ROS (+) dizziness, described more as dysequilibrium than lightheadedness. All other ROS reviewed and negative.    Current Outpatient Medications:   •  aspirin 81 MG QD  •  azelastine (ASTEPRO) 0.15 % solution nasal spray AD  •  Canagliflozin (Invokana) 300 MG QD  •  Cholecalciferol (VITAMIN D3) 5000 UNITS QD   •  EPINEPHrine (EPIPEN) 0.3 MG/0.3ML soln prn  •  fenofibric acid (TRILIPIX) 135 MG QD  •  icosapent ethyl (Vascepa) 1 g 2 BID  •  metFORMIN  MG 4 QD  •  Multiple Vitamins-Minerals QD  •  rosuvastatin (CRESTOR) 10 MG QD  •  Semaglutide, 1 MG/DOSE, (OZEMPIC) 2 MG/1.5ML 1mg weekly    VITALS:  /84   Pulse 85   Temp 97.2 °F (36.2 °C)   Ht 172.7 cm (68\")   Wt 89.4 kg (197 lb)   SpO2 97%   BMI 29.95 kg/m²     Physical Exam  Vitals and nursing note reviewed.   Constitutional:       General: He is not in acute distress.     Appearance: Normal appearance. He is not ill-appearing.   Eyes:      Extraocular Movements: Extraocular movements intact.      Conjunctiva/sclera: Conjunctivae normal.   Pulmonary:      Effort: Pulmonary effort is normal. No respiratory distress.   Skin:     Comments: Medial " left big toenail with thickening, mild cracking, yellow discoloration; no swelling, bleeding, or pain   Neurological:      Mental Status: He is alert and oriented to person, place, and time. Mental status is at baseline.   Psychiatric:         Mood and Affect: Mood normal.         Behavior: Behavior normal.         LABS  Results for orders placed or performed in visit on 11/29/22   Lipid Panel    Specimen: Blood   Result Value Ref Range    Total Cholesterol 205 (H) 0 - 200 mg/dL    Triglycerides 577 (H) 0 - 150 mg/dL    HDL Cholesterol 35 (L) 40 - 60 mg/dL    LDL Cholesterol  78 0 - 100 mg/dL    VLDL Cholesterol 92 (H) 5 - 40 mg/dL    LDL/HDL Ratio 1.56    Hemoglobin A1c    Specimen: Blood   Result Value Ref Range    Hemoglobin A1C 7.70 (H) 4.80 - 5.60 %   Hepatic Function Panel    Specimen: Blood   Result Value Ref Range    Total Protein 7.5 6.0 - 8.5 g/dL    Albumin 4.60 3.50 - 5.20 g/dL    ALT (SGPT) 25 1 - 41 U/L    AST (SGOT) 20 1 - 40 U/L    Alkaline Phosphatase 37 (L) 39 - 117 U/L    Total Bilirubin 0.4 0.0 - 1.2 mg/dL    Bilirubin, Direct <0.2 0.0 - 0.3 mg/dL    Bilirubin, Indirect     CK    Specimen: Blood   Result Value Ref Range    Creatine Kinase 106 20 - 200 U/L     9/22 A1C 6.5, , , HDL 32,     ASSESSMENT/PLAN    Diagnoses and all orders for this visit:    1. Type 2 diabetes mellitus with both eyes affected by mild nonproliferative retinopathy without macular edema, without long-term current use of insulin (HCC) (Primary)  Assessment & Plan:  BG control worsened with A1C 7.7; cont Ozempic 1mg weekly, met ER 2000mg QD, and invokana 300mg QD; pt wants to work on increasing exercise plan - will be retiring next week; cont home BG monitoring and f/u A1C in 3 mos; if not close to 7.0 or better at that time, will likely add Toujeo    Orders:  -     Hemoglobin A1c; Future    2. Hypertension  Assessment & Plan:  BP bord/stable 132/84; no meds; goal < 130/80; patient has plans to start  exercising regularly      3. Hyperlipidemia  Assessment & Plan:  Lipids with improved LDL 78 with goal < 70 but significantly worsened TGs 577 (goal < 150); with increased 10-yr CVD risk, will go ahead and incr rosuvastatin 20mg QD #90, 1RF; cont fenofibrate 135mg QD and vascepa 1gm 2 BID with special attention to decreased saturated fats and fried foods; f/u lipids in 3 mos    The 10-year CVD risk score (JOSÉ MIGUEL'Jhonathan, et al., 2008) is: 35.9%    Values used to calculate the score:      Age: 62 years      Sex: Male      Diabetic: Yes      Tobacco smoker: No      Systolic Blood Pressure: 132 mmHg      Is BP treated: No      HDL Cholesterol: 35 mg/dL      Total Cholesterol: 205 mg/dL      Orders:  -     Lipid Panel; Future  -     rosuvastatin (CRESTOR) 20 MG tablet; Take 1 tablet by mouth Daily.  Dispense: 90 tablet; Refill: 1    4. Hypertriglyceridemia  Assessment & Plan:  Lipids with worsened TGs 577 with goal < 150; remains on fenofibrate 135mg QD and vascepa 1gm 2 BID; encouraged patient to be more strict with low fat/cholesterol diet; f/u lipids in 3 mos      5. Onychomycosis of toenail  Assessment & Plan:  Left big toenail, improving per patient; discussed importance of keeping affected toenail trimmed; discussed options and limited efficacy of topical treatment options and risks of oral anti-fungal; rec consideration for trial of topical teatree oil; follow clinically      6. Dizziness  Comments:  currently mild and intermittent; discussed inner ear dysfunction vs vertigo; pt also w/ hx of Merniere's; follow clinically for now since mild      FOLLOW-UP  1. Health maintenance - COVID19 vacc done ( no boosters); rec proceeding with new COVID19 vacc (he will get at [facility]); flu vacc done 10/22  2. F/u in 3 mos with A1C and lipids (escribed)    Electronically signed by:    Violette Hylton MD, FACP  12/02/2022

## 2022-12-01 NOTE — ASSESSMENT & PLAN NOTE
BG control worsened with A1C 7.7; cont Ozempic 1mg weekly, met ER 2000mg QD, and invokana 300mg QD; pt wants to work on increasing exercise plan - will be retiring next week; cont home BG monitoring and f/u A1C in 3 mos; if not close to 7.0 or better at that time, will likely add Toujeo

## 2022-12-02 ENCOUNTER — OFFICE VISIT (OUTPATIENT)
Dept: INTERNAL MEDICINE | Facility: CLINIC | Age: 62
End: 2022-12-02

## 2022-12-02 VITALS
OXYGEN SATURATION: 97 % | BODY MASS INDEX: 29.86 KG/M2 | TEMPERATURE: 97.2 F | SYSTOLIC BLOOD PRESSURE: 132 MMHG | WEIGHT: 197 LBS | HEIGHT: 68 IN | DIASTOLIC BLOOD PRESSURE: 84 MMHG | HEART RATE: 85 BPM

## 2022-12-02 DIAGNOSIS — E11.3293 TYPE 2 DIABETES MELLITUS WITH BOTH EYES AFFECTED BY MILD NONPROLIFERATIVE RETINOPATHY WITHOUT MACULAR EDEMA, WITHOUT LONG-TERM CURRENT USE OF INSULIN: Primary | Chronic | ICD-10-CM

## 2022-12-02 DIAGNOSIS — E78.2 MIXED HYPERLIPIDEMIA: Chronic | ICD-10-CM

## 2022-12-02 DIAGNOSIS — B35.1 ONYCHOMYCOSIS OF TOENAIL: ICD-10-CM

## 2022-12-02 DIAGNOSIS — I10 ESSENTIAL HYPERTENSION: Chronic | ICD-10-CM

## 2022-12-02 DIAGNOSIS — E78.1 HYPERTRIGLYCERIDEMIA: Chronic | ICD-10-CM

## 2022-12-02 DIAGNOSIS — R42 DIZZINESS: ICD-10-CM

## 2022-12-02 PROCEDURE — 99214 OFFICE O/P EST MOD 30 MIN: CPT | Performed by: INTERNAL MEDICINE

## 2022-12-02 RX ORDER — ROSUVASTATIN CALCIUM 20 MG/1
20 TABLET, COATED ORAL DAILY
Qty: 90 TABLET | Refills: 1 | Status: SHIPPED | OUTPATIENT
Start: 2022-12-02

## 2022-12-02 NOTE — ASSESSMENT & PLAN NOTE
Left big toenail, improving per patient; discussed importance of keeping affected toenail trimmed; discussed options and limited efficacy of topical treatment options and risks of oral anti-fungal; rec consideration for trial of topical teatree oil; follow clinically

## 2023-02-22 DIAGNOSIS — E78.2 MIXED HYPERLIPIDEMIA: ICD-10-CM

## 2023-02-23 RX ORDER — FENOFIBRIC ACID 135 MG/1
CAPSULE, DELAYED RELEASE ORAL
Qty: 30 CAPSULE | Refills: 0 | Status: SHIPPED | OUTPATIENT
Start: 2023-02-23 | End: 2023-03-08 | Stop reason: SDUPTHER

## 2023-02-23 NOTE — TELEPHONE ENCOUNTER
Last Office Visit: 12/02/2022  Next Office Visit:03/10/23    Labs completed in past 6 months? yes  Labs completed in past year? yes    Last Refill Date:11/07/2023  Quantity:90  Refills:0    Pharmacy:     Please review pended refill request for any changes needed on refills or quantities. Thank you!

## 2023-03-07 ENCOUNTER — LAB (OUTPATIENT)
Dept: LAB | Facility: HOSPITAL | Age: 63
End: 2023-03-07
Payer: COMMERCIAL

## 2023-03-07 DIAGNOSIS — E78.2 MIXED HYPERLIPIDEMIA: Chronic | ICD-10-CM

## 2023-03-07 DIAGNOSIS — E11.3293 TYPE 2 DIABETES MELLITUS WITH BOTH EYES AFFECTED BY MILD NONPROLIFERATIVE RETINOPATHY WITHOUT MACULAR EDEMA, WITHOUT LONG-TERM CURRENT USE OF INSULIN: Chronic | ICD-10-CM

## 2023-03-07 LAB
CHOLEST SERPL-MCNC: 89 MG/DL (ref 0–200)
HBA1C MFR BLD: 6.6 % (ref 4.8–5.6)
HDLC SERPL-MCNC: 29 MG/DL (ref 40–60)
LDLC SERPL CALC-MCNC: 37 MG/DL (ref 0–100)
LDLC/HDLC SERPL: 1.19 {RATIO}
TRIGL SERPL-MCNC: 128 MG/DL (ref 0–150)
VLDLC SERPL-MCNC: 23 MG/DL (ref 5–40)

## 2023-03-07 PROCEDURE — 83036 HEMOGLOBIN GLYCOSYLATED A1C: CPT

## 2023-03-07 PROCEDURE — 80061 LIPID PANEL: CPT

## 2023-03-09 ENCOUNTER — PRIOR AUTHORIZATION (OUTPATIENT)
Dept: INTERNAL MEDICINE | Facility: CLINIC | Age: 63
End: 2023-03-09
Payer: COMMERCIAL

## 2023-03-09 NOTE — ASSESSMENT & PLAN NOTE
Lipids significantly improved with LDL 37, ; cont rosuvastatin 20mg QD, vascepa 1gm 2 BID, and trilipix 135mg QD #90, 0RF

## 2023-03-09 NOTE — PROGRESS NOTES
"Chief Complaint   Patient presents with   • Diabetes   • Hyperlipidemia       History of Present Illness  62 y.o.  gentleman presents for DM and cholesterol f/u. No s/e with meds. Fasting and nonfasting BGs have been 120-140s. Has retired. Walking for exercise.    Review of Systems  Denies CP, SOB, abd pain. All other ROS reviewed and negative.      Current Outpatient Medications:   •  aspirin 81 MG QD  •  azelastine (ASTEPRO) 0.15 % solutio AD  •  Canagliflozin (Invokana) 300 MG QD  •  Cholecalciferol (VITAMIN D3) 5000 UNITS QD  •  EPINEPHrine (EPIPEN) 0.3 MG/0.3ML prn  •  fenofibric acid (TRILIPIX) 135 MG QD  •  icosapent ethyl (Vascepa) 1 g  2 BID  •  metFORMIN  MG 4 QD  •  Multiple Vitamins-Minerals QD  •  rosuvastatin (CRESTOR) 20 MG QD  •  Semaglutide (OZEMPIC) 2 MG/1.5ML 1mg weekly    VITALS:  /72   Pulse 70   Ht 172.7 cm (68\")   Wt 87.5 kg (193 lb)   SpO2 99%   BMI 29.35 kg/m²     Physical Exam  Vitals and nursing note reviewed.   Constitutional:       General: He is not in acute distress.     Appearance: Normal appearance. He is not ill-appearing.   Eyes:      Extraocular Movements: Extraocular movements intact.      Conjunctiva/sclera: Conjunctivae normal.   Pulmonary:      Effort: Pulmonary effort is normal. No respiratory distress.   Neurological:      Mental Status: He is alert and oriented to person, place, and time. Mental status is at baseline.   Psychiatric:         Mood and Affect: Mood normal.         Behavior: Behavior normal.         LABS  Results for orders placed or performed in visit on 03/07/23   Lipid Panel    Specimen: Blood   Result Value Ref Range    Total Cholesterol 89 0 - 200 mg/dL    Triglycerides 128 0 - 150 mg/dL    HDL Cholesterol 29 (L) 40 - 60 mg/dL    LDL Cholesterol  37 0 - 100 mg/dL    VLDL Cholesterol 23 5 - 40 mg/dL    LDL/HDL Ratio 1.19    Hemoglobin A1c    Specimen: Blood   Result Value Ref Range    Hemoglobin A1C 6.60 (H) 4.80 - 5.60 %     11/22 " A1C 7.7, , LDL 78    ASSESSMENT/PLAN    Diagnoses and all orders for this visit:    1. Type 2 diabetes mellitus with both eyes affected by mild nonproliferative retinopathy without macular edema, without long-term current use of insulin (HCC) (Primary)  Assessment & Plan:  BG control stable with A1C 6.6; cont invokana 300mg QD, met ER 500mg 4 QD, and ozempic 1mg weekly; encouraged reg phys activity to decr insulin resistance, moderation in unhealthy starches/sweets; f/u A1C in 3 mos        2. Hyperlipidemia  Assessment & Plan:  Lipids significantly improved with LDL 37, ; cont rosuvastatin 20mg QD, vascepa 1gm 2 BID, and trilipix 135mg QD #90, 0RF    Orders:  -     fenofibric acid (TRILIPIX) 135 MG capsule delayed-release delayed release capsule; Take 1 capsule by mouth Daily.  Dispense: 90 capsule; Refill: 0    3. Hypertriglyceridemia  Assessment & Plan:  Lipids significantly improved with LDL 37, ; cont rosuvastatin 20mg QD, vascepa 1gm 2 BID, and trilipix 135mg QD #90, 0RF      4. Hypertension  Assessment & Plan:  BP stable 124/72; no meds; rec home monitoring with goal < 130/80        FOLLOW-UP  1. Health maintenance - COVID19 vacc done (no boosters), including new COVID19 vacc; flu vacc 10/22  2. RTC for PE 6/13/23; fasting labs prior to appt (CBC, CMP, TSH, lipids, UA/micr, microalb, A1C, PSA, CPK, vit D, B12)    Electronically signed by:    Violette Hylton MD, FACP  03/10/2023

## 2023-03-09 NOTE — ASSESSMENT & PLAN NOTE
BG control stable with A1C 6.6; cont invokana 300mg QD, met ER 500mg 4 QD, and ozempic 1mg weekly; encouraged reg phys activity to decr insulin resistance, moderation in unhealthy starches/sweets; f/u A1C in 3 mos

## 2023-03-10 ENCOUNTER — OFFICE VISIT (OUTPATIENT)
Dept: INTERNAL MEDICINE | Facility: CLINIC | Age: 63
End: 2023-03-10
Payer: COMMERCIAL

## 2023-03-10 VITALS
SYSTOLIC BLOOD PRESSURE: 124 MMHG | OXYGEN SATURATION: 99 % | BODY MASS INDEX: 29.25 KG/M2 | HEIGHT: 68 IN | DIASTOLIC BLOOD PRESSURE: 72 MMHG | WEIGHT: 193 LBS | HEART RATE: 70 BPM

## 2023-03-10 DIAGNOSIS — E78.1 HYPERTRIGLYCERIDEMIA: Chronic | ICD-10-CM

## 2023-03-10 DIAGNOSIS — E11.3293 TYPE 2 DIABETES MELLITUS WITH BOTH EYES AFFECTED BY MILD NONPROLIFERATIVE RETINOPATHY WITHOUT MACULAR EDEMA, WITHOUT LONG-TERM CURRENT USE OF INSULIN: Primary | Chronic | ICD-10-CM

## 2023-03-10 DIAGNOSIS — I10 ESSENTIAL HYPERTENSION: Chronic | ICD-10-CM

## 2023-03-10 DIAGNOSIS — E78.2 MIXED HYPERLIPIDEMIA: Chronic | ICD-10-CM

## 2023-03-10 PROCEDURE — 99214 OFFICE O/P EST MOD 30 MIN: CPT | Performed by: INTERNAL MEDICINE

## 2023-03-10 RX ORDER — FENOFIBRIC ACID 135 MG/1
135 CAPSULE, DELAYED RELEASE ORAL DAILY
Qty: 90 CAPSULE | Refills: 0 | Status: SHIPPED | OUTPATIENT
Start: 2023-03-10

## 2023-03-14 DIAGNOSIS — E11.3293 TYPE 2 DIABETES MELLITUS WITH BOTH EYES AFFECTED BY MILD NONPROLIFERATIVE RETINOPATHY WITHOUT MACULAR EDEMA, WITHOUT LONG-TERM CURRENT USE OF INSULIN: Chronic | ICD-10-CM

## 2023-03-14 RX ORDER — CANAGLIFLOZIN 300 MG/1
300 TABLET, FILM COATED ORAL DAILY
Qty: 90 TABLET | Refills: 3 | Status: CANCELLED | OUTPATIENT
Start: 2023-03-14

## 2023-03-15 NOTE — TELEPHONE ENCOUNTER
"D/c invokana d/t to insurance formulary  RX jardiance 25mg QD #90, 0RF              Talia Freed MA sent to Violette Hylton MD  Phone Number: [phone]     Received the denial from [facility] today for his Invokana, they want him to try Farxiga, Jardiance, or Seglatro. Please advise.           Previous Messages       ----- Message -----   From: Bradley Ortiz \"Sal\"   Sent: 3/14/2023   1:17 PM EDT   To: [facility]   Subject: Medication Renewal Request                       Refills have been requested for the following medications:         Canagliflozin (Invokana) 300 MG tablet tablet [Violette Hylton]       Patient Comment: My pharmacy said they sent a Prior Authorization for my Invokana prescription. I have ~12 days remaining. Let me know if you have any questions. Thank you     Preferred pharmacy: [address] [phone] [facility] [phone] [facility]   Delivery method: Pickup     "

## 2023-04-19 ENCOUNTER — OFFICE VISIT (OUTPATIENT)
Dept: INTERNAL MEDICINE | Facility: CLINIC | Age: 63
End: 2023-04-19
Payer: COMMERCIAL

## 2023-04-19 VITALS
HEART RATE: 82 BPM | HEIGHT: 68 IN | BODY MASS INDEX: 28.49 KG/M2 | SYSTOLIC BLOOD PRESSURE: 132 MMHG | TEMPERATURE: 97.2 F | WEIGHT: 188 LBS | OXYGEN SATURATION: 98 % | DIASTOLIC BLOOD PRESSURE: 82 MMHG

## 2023-04-19 DIAGNOSIS — H66.001 ACUTE SUPPURATIVE OTITIS MEDIA OF RIGHT EAR WITHOUT SPONTANEOUS RUPTURE OF TYMPANIC MEMBRANE, RECURRENCE NOT SPECIFIED: Primary | ICD-10-CM

## 2023-04-19 DIAGNOSIS — R05.9 COUGH IN ADULT: ICD-10-CM

## 2023-04-19 DIAGNOSIS — J02.9 SORE THROAT: ICD-10-CM

## 2023-04-19 DIAGNOSIS — R09.81 NASAL CONGESTION: ICD-10-CM

## 2023-04-19 PROCEDURE — U0004 COV-19 TEST NON-CDC HGH THRU: HCPCS | Performed by: NURSE PRACTITIONER

## 2023-04-19 RX ORDER — AMOXICILLIN AND CLAVULANATE POTASSIUM 875; 125 MG/1; MG/1
1 TABLET, FILM COATED ORAL 2 TIMES DAILY
Qty: 10 TABLET | Refills: 0 | Status: SHIPPED | OUTPATIENT
Start: 2023-04-19 | End: 2023-04-24

## 2023-04-19 RX ORDER — GUAIFENESIN 600 MG/1
600 TABLET, EXTENDED RELEASE ORAL 2 TIMES DAILY
Qty: 20 TABLET | Refills: 0 | Status: SHIPPED | OUTPATIENT
Start: 2023-04-19 | End: 2023-04-29

## 2023-04-19 RX ORDER — DEXTROMETHORPHAN HYDROBROMIDE AND PROMETHAZINE HYDROCHLORIDE 15; 6.25 MG/5ML; MG/5ML
5 SYRUP ORAL NIGHTLY PRN
Qty: 180 ML | Refills: 0 | Status: SHIPPED | OUTPATIENT
Start: 2023-04-19

## 2023-04-19 NOTE — PROGRESS NOTES
Office Note     Name: Bradley Ortiz    : 1960     MRN: [id number]     Chief Complaint  Cough (Started 2 days ago. Has tried otc nasal spray and allergy medicine ), Nasal Congestion, Sore Throat, and Right ear pain    Subjective     History of Present Illness:  Bradley Ortiz is a 62 y.o. male who presents today for complaints of cough, nasal congestion, sore throat, and right ear pain.  Patient reports onset of cough nasal congestion and sore throat began about 1 month ago.  It did resolve temporarily with over-the-counter medication use.  His symptoms returned about 2 to 3 days ago.  He now is having a nonproductive cough, nasal congestion with thick, yellow nasal drainage, sore throat, and new onset right ear pain.  He reports his sore throat is more so on the right side.  He does note mild discomfort to his external ear area.  He is not sleeping well at night due to his symptoms.  He denies fever.  He denies contact with known sick persons.  He has been using a normal saline nasal spray, Astepro, and a daily antihistamine.  He has had no resolution in symptoms with over-the-counter medication use.  He presents today for evaluation of the above acute complaints.  He normally follows with Dr. Hylton for chronic conditions.  He denies further complaints or concerns at this time.  Had a pleasant visit with the patient today.    Review of Systems   Constitutional: Negative for chills, fatigue and fever.   HENT: Positive for congestion, ear pain, postnasal drip and sore throat.    Respiratory: Positive for cough. Negative for chest tightness, shortness of breath and wheezing.    Cardiovascular: Negative.    Gastrointestinal: Negative.    Neurological: Negative for headaches.   Psychiatric/Behavioral: Positive for sleep disturbance.       Past Medical History:   Diagnosis Date   • Bee sting 2016    Impression: 2014 - precautions discussed; has updated Epi Pen  Impression: 2014 - borderline  allergic reaction but could be as extensive as it is due to the numerous stings he sustained; DXM 1cc IM x1 in the office; RXs for prednisone taper #36, 0RF and epi pen #1, 0RF; also rec OTC antihsitamine QD and zantac/pepcid BID, aveeno oatmeal baths, and avoidance of heat and hot showers;    • Hx of anaphylaxis 08/04/2019    bee sting (8/4/19), went to ER, has Epi pen   • Hx of colonoscopy 06/15/2015    nl colonosc (6/15/15), repeat 5 yrs; [facility]   • Hx of colonoscopy 09/21/2020    colonosc (9/21/20): diverticulosis, enlarged prostate, repeat 5 yrs; [facility]       Past Surgical History:   Procedure Laterality Date   • CARPAL TUNNEL RELEASE Right 10/2017    [facility] - Dr. Bertrand   • COLONOSCOPY     • TONSILLECTOMY  1970    age 10       Social History     Socioeconomic History   • Marital status:    • Number of children: 2   Tobacco Use   • Smoking status: Never   • Smokeless tobacco: Never   Vaping Use   • Vaping Use: Never used   Substance and Sexual Activity   • Alcohol use: No   • Drug use: No   • Sexual activity: Yes     Partners: Female         Current Outpatient Medications:   •  aspirin 81 MG tablet, Take 1 tablet by mouth Daily., Disp: , Rfl:   •  azelastine (ASTEPRO) 0.15 % solution nasal spray, 2 sprays into the nostril(s) as directed by provider Daily., Disp: 30 mL, Rfl: 0  •  Cholecalciferol (VITAMIN D3) 5000 UNITS capsule capsule, Take 1 capsule by mouth Daily., Disp: , Rfl:   •  empagliflozin (Jardiance) 25 MG tablet tablet, Take 1 tablet by mouth Daily., Disp: 90 tablet, Rfl: 0  •  EPINEPHrine (EPIPEN) 0.3 MG/0.3ML solution auto-injector injection, USE AS DIRECTED, Disp: 1 each, Rfl: 1  •  fenofibric acid (TRILIPIX) 135 MG capsule delayed-release delayed release capsule, Take 1 capsule by mouth Daily., Disp: 90 capsule, Rfl: 0  •  glucose blood (OneTouch Verio) test strip, Use as instructed, Disp: 100 each, Rfl: 3  •  icosapent ethyl (Vascepa) 1 g capsule capsule, Take 2 g by mouth  "2 (Two) Times a Day With Meals., Disp: 360 capsule, Rfl: 2  •  metFORMIN ER (GLUCOPHAGE-XR) 500 MG 24 hr tablet, Take 4 tablets by mouth Daily With Breakfast., Disp: 360 tablet, Rfl: 3  •  Multiple Vitamins-Minerals (MULTIVITAMIN ADULT PO), Take  by mouth Daily., Disp: , Rfl:   •  OneTouch Delica Lancets 33G misc, 1 each 3 (Three) Times a Day., Disp: 100 each, Rfl: 3  •  rosuvastatin (CRESTOR) 20 MG tablet, Take 1 tablet by mouth Daily., Disp: 90 tablet, Rfl: 1  •  Semaglutide, 1 MG/DOSE, (OZEMPIC) 2 MG/1.5ML solution pen-injector, Inject 1 mg under the skin into the appropriate area as directed 1 (One) Time Per Week., Disp: 6 pen, Rfl: 3  •  amoxicillin-clavulanate (Augmentin) 875-125 MG per tablet, Take 1 tablet by mouth 2 (Two) Times a Day for 5 days., Disp: 10 tablet, Rfl: 0  •  guaiFENesin (Mucinex) 600 MG 12 hr tablet, Take 1 tablet by mouth 2 (Two) Times a Day for 10 days., Disp: 20 tablet, Rfl: 0  •  promethazine-dextromethorphan (PROMETHAZINE-DM) 6.25-15 MG/5ML syrup, Take 5 mL by mouth At Night As Needed for Cough., Disp: 180 mL, Rfl: 0    Objective     Vital Signs  /82   Pulse 82   Temp 97.2 °F (36.2 °C)   Ht 172.7 cm (67.99\")   Wt 85.3 kg (188 lb)   SpO2 98%   BMI 28.59 kg/m²   Estimated body mass index is 28.59 kg/m² as calculated from the following:    Height as of this encounter: 172.7 cm (67.99\").    Weight as of this encounter: 85.3 kg (188 lb).    BMI is >= 25 and <30. (Overweight) The following options were offered after discussion;: Not addressed at this visit      Physical Exam  Constitutional:       Appearance: Normal appearance.   HENT:      Head: Normocephalic and atraumatic.      Right Ear: External ear normal. Tympanic membrane is erythematous and bulging.      Left Ear: Tympanic membrane, ear canal and external ear normal.      Ears:      Comments: Mild redness to ear canal to the right.     Nose: Nose normal.      Mouth/Throat:      Mouth: Mucous membranes are moist.      " Pharynx: Oropharynx is clear. Posterior oropharyngeal erythema present. No oropharyngeal exudate.   Eyes:      Extraocular Movements: Extraocular movements intact.      Conjunctiva/sclera: Conjunctivae normal.      Pupils: Pupils are equal, round, and reactive to light.   Cardiovascular:      Rate and Rhythm: Normal rate and regular rhythm.      Heart sounds: Normal heart sounds.   Pulmonary:      Effort: Pulmonary effort is normal. No respiratory distress.      Breath sounds: Normal breath sounds. No wheezing or rhonchi.   Musculoskeletal:         General: Normal range of motion.      Cervical back: Normal range of motion and neck supple.   Lymphadenopathy:      Cervical: No cervical adenopathy.   Skin:     General: Skin is warm and dry.   Neurological:      General: No focal deficit present.      Mental Status: He is alert and oriented to person, place, and time. Mental status is at baseline.   Psychiatric:         Mood and Affect: Mood normal.         Behavior: Behavior normal.         Thought Content: Thought content normal.         Judgment: Judgment normal.          Assessment and Plan     Diagnoses and all orders for this visit:    1. Acute suppurative otitis media of right ear without spontaneous rupture of tympanic membrane, recurrence not specified (Primary)  -     amoxicillin-clavulanate (Augmentin) 875-125 MG per tablet; Take 1 tablet by mouth 2 (Two) Times a Day for 5 days.  Dispense: 10 tablet; Refill: 0    2. Cough in adult  -     POCT SARS-CoV-2 Antigen JAVIER + Flu  -     COVID-19 PCR, [facility], NP SWAB IN [facility] VIRAL TRANSPORT MEDIA/ORAL SWISH 24-30 HR TAT - Swab, Nasopharynx; Future  -     COVID-19 PCR, [facility], NP SWAB IN [facility] VIRAL TRANSPORT MEDIA/ORAL SWISH 24-30 HR TAT - Swab, Nasopharynx  -     guaiFENesin (Mucinex) 600 MG 12 hr tablet; Take 1 tablet by mouth 2 (Two) Times a Day for 10 days.  Dispense: 20 tablet; Refill: 0  -     promethazine-dextromethorphan (PROMETHAZINE-DM) 6.25-15  MG/5ML syrup; Take 5 mL by mouth At Night As Needed for Cough.  Dispense: 180 mL; Refill: 0    3. Nasal congestion  -     POCT SARS-CoV-2 Antigen JAVIER + Flu  -     COVID-19 PCR, [facility], NP SWAB IN [facility] VIRAL TRANSPORT MEDIA/ORAL SWISH 24-30 HR TAT - Swab, Nasopharynx; Future  -     COVID-19 PCR, [facility], NP SWAB IN [facility] VIRAL TRANSPORT MEDIA/ORAL SWISH 24-30 HR TAT - Swab, Nasopharynx  -     guaiFENesin (Mucinex) 600 MG 12 hr tablet; Take 1 tablet by mouth 2 (Two) Times a Day for 10 days.  Dispense: 20 tablet; Refill: 0  -     promethazine-dextromethorphan (PROMETHAZINE-DM) 6.25-15 MG/5ML syrup; Take 5 mL by mouth At Night As Needed for Cough.  Dispense: 180 mL; Refill: 0    4. Sore throat  -     POCT SARS-CoV-2 Antigen JAVIER + Flu  -     POCT rapid strep A  -     COVID-19 PCR, [facility], NP SWAB IN [facility] VIRAL TRANSPORT MEDIA/ORAL SWISH 24-30 HR TAT - Swab, Nasopharynx; Future  -     COVID-19 PCR, [facility], NP SWAB IN [facility] VIRAL TRANSPORT MEDIA/ORAL SWISH 24-30 HR TAT - Swab, Nasopharynx    Plan:  COVID and flu swab in the office today negative.  Rapid strep swab in the office today negative.  COVID PCR pending.  Will treat with Augmentin twice daily for 5 days for acute otitis media of the right ear.  Will send in Mucinex to take twice daily.  Will also send Promethazine DM to use as needed at nighttime.  May continue to use nasal saline spray and Astepro as needed.  May also continue daily antihistamine.  Encourage adequate oral hydration and rest.  Return to clinic if symptoms worsen or fail to improve with current plan of care.  Go to ED for further evaluation if any symptom worsens.    Follow Up  Return if symptoms worsen or fail to improve.    AQUILES Jenkins    Part of this note may be an electronic transcription/translation of spoken language to printed text using the Dragon Dictation System.

## 2023-04-20 LAB — SARS-COV-2 RNA NOSE QL NAA+PROBE: NOT DETECTED

## 2023-05-26 DIAGNOSIS — E55.9 VITAMIN D DEFICIENCY: Chronic | ICD-10-CM

## 2023-05-26 DIAGNOSIS — E11.3293 TYPE 2 DIABETES MELLITUS WITH BOTH EYES AFFECTED BY MILD NONPROLIFERATIVE RETINOPATHY WITHOUT MACULAR EDEMA, WITHOUT LONG-TERM CURRENT USE OF INSULIN: Chronic | ICD-10-CM

## 2023-05-26 DIAGNOSIS — Z00.00 PE (PHYSICAL EXAM), ROUTINE: Primary | Chronic | ICD-10-CM

## 2023-05-26 DIAGNOSIS — E78.2 MIXED HYPERLIPIDEMIA: Chronic | ICD-10-CM

## 2023-05-26 DIAGNOSIS — I10 ESSENTIAL HYPERTENSION: Chronic | ICD-10-CM

## 2023-05-26 DIAGNOSIS — Z12.5 SCREENING FOR PROSTATE CANCER: ICD-10-CM

## 2023-06-07 ENCOUNTER — LAB (OUTPATIENT)
Dept: LAB | Facility: HOSPITAL | Age: 63
End: 2023-06-07
Payer: COMMERCIAL

## 2023-06-07 DIAGNOSIS — E78.2 MIXED HYPERLIPIDEMIA: Chronic | ICD-10-CM

## 2023-06-07 DIAGNOSIS — E11.3293 TYPE 2 DIABETES MELLITUS WITH BOTH EYES AFFECTED BY MILD NONPROLIFERATIVE RETINOPATHY WITHOUT MACULAR EDEMA, WITHOUT LONG-TERM CURRENT USE OF INSULIN: Chronic | ICD-10-CM

## 2023-06-07 DIAGNOSIS — Z12.5 SCREENING FOR PROSTATE CANCER: ICD-10-CM

## 2023-06-07 DIAGNOSIS — E55.9 VITAMIN D DEFICIENCY: Chronic | ICD-10-CM

## 2023-06-07 DIAGNOSIS — Z00.00 PE (PHYSICAL EXAM), ROUTINE: ICD-10-CM

## 2023-06-07 LAB
25(OH)D3 SERPL-MCNC: 49.1 NG/ML (ref 30–100)
ALBUMIN SERPL-MCNC: 4.6 G/DL (ref 3.5–5.2)
ALBUMIN UR-MCNC: <1.2 MG/DL
ALBUMIN/GLOB SERPL: 1.7 G/DL
ALP SERPL-CCNC: 31 U/L (ref 39–117)
ALT SERPL W P-5'-P-CCNC: 18 U/L (ref 1–41)
ANION GAP SERPL CALCULATED.3IONS-SCNC: 13 MMOL/L (ref 5–15)
AST SERPL-CCNC: 17 U/L (ref 1–40)
BACTERIA UR QL AUTO: NORMAL /HPF
BASOPHILS # BLD AUTO: 0.06 10*3/MM3 (ref 0–0.2)
BASOPHILS NFR BLD AUTO: 0.7 % (ref 0–1.5)
BILIRUB SERPL-MCNC: 0.5 MG/DL (ref 0–1.2)
BILIRUB UR QL STRIP: NEGATIVE
BUN SERPL-MCNC: 23 MG/DL (ref 8–23)
BUN/CREAT SERPL: 18 (ref 7–25)
CALCIUM SPEC-SCNC: 10.9 MG/DL (ref 8.6–10.5)
CHLORIDE SERPL-SCNC: 105 MMOL/L (ref 98–107)
CHOLEST SERPL-MCNC: 115 MG/DL (ref 0–200)
CK SERPL-CCNC: 174 U/L (ref 20–200)
CLARITY UR: CLEAR
CO2 SERPL-SCNC: 22 MMOL/L (ref 22–29)
COLOR UR: YELLOW
CREAT SERPL-MCNC: 1.28 MG/DL (ref 0.76–1.27)
CREAT UR-MCNC: 122.9 MG/DL
DEPRECATED RDW RBC AUTO: 40.9 FL (ref 37–54)
EGFRCR SERPLBLD CKD-EPI 2021: 63.3 ML/MIN/1.73
EOSINOPHIL # BLD AUTO: 0.19 10*3/MM3 (ref 0–0.4)
EOSINOPHIL NFR BLD AUTO: 2.4 % (ref 0.3–6.2)
ERYTHROCYTE [DISTWIDTH] IN BLOOD BY AUTOMATED COUNT: 13.9 % (ref 12.3–15.4)
GLOBULIN UR ELPH-MCNC: 2.7 GM/DL
GLUCOSE SERPL-MCNC: 108 MG/DL (ref 65–99)
GLUCOSE UR STRIP-MCNC: ABNORMAL MG/DL
HBA1C MFR BLD: 6.7 % (ref 4.8–5.6)
HCT VFR BLD AUTO: 49.2 % (ref 37.5–51)
HDLC SERPL-MCNC: 34 MG/DL (ref 40–60)
HGB BLD-MCNC: 16.4 G/DL (ref 13–17.7)
HGB UR QL STRIP.AUTO: NEGATIVE
HYALINE CASTS UR QL AUTO: NORMAL /LPF
IMM GRANULOCYTES # BLD AUTO: 0.03 10*3/MM3 (ref 0–0.05)
IMM GRANULOCYTES NFR BLD AUTO: 0.4 % (ref 0–0.5)
KETONES UR QL STRIP: NEGATIVE
LDLC SERPL CALC-MCNC: 56 MG/DL (ref 0–100)
LDLC/HDLC SERPL: 1.56 {RATIO}
LEUKOCYTE ESTERASE UR QL STRIP.AUTO: NEGATIVE
LYMPHOCYTES # BLD AUTO: 3.25 10*3/MM3 (ref 0.7–3.1)
LYMPHOCYTES NFR BLD AUTO: 40.3 % (ref 19.6–45.3)
MCH RBC QN AUTO: 27.3 PG (ref 26.6–33)
MCHC RBC AUTO-ENTMCNC: 33.3 G/DL (ref 31.5–35.7)
MCV RBC AUTO: 81.9 FL (ref 79–97)
MICROALBUMIN/CREAT UR: NORMAL MG/G{CREAT}
MONOCYTES # BLD AUTO: 0.56 10*3/MM3 (ref 0.1–0.9)
MONOCYTES NFR BLD AUTO: 6.9 % (ref 5–12)
NEUTROPHILS NFR BLD AUTO: 3.98 10*3/MM3 (ref 1.7–7)
NEUTROPHILS NFR BLD AUTO: 49.3 % (ref 42.7–76)
NITRITE UR QL STRIP: NEGATIVE
NRBC BLD AUTO-RTO: 0 /100 WBC (ref 0–0.2)
PH UR STRIP.AUTO: 5.5 [PH] (ref 5–8)
PLATELET # BLD AUTO: 242 10*3/MM3 (ref 140–450)
PMV BLD AUTO: 10.1 FL (ref 6–12)
POTASSIUM SERPL-SCNC: 4.7 MMOL/L (ref 3.5–5.2)
PROT SERPL-MCNC: 7.3 G/DL (ref 6–8.5)
PROT UR QL STRIP: NEGATIVE
PSA SERPL-MCNC: 1.4 NG/ML (ref 0–4)
RBC # BLD AUTO: 6.01 10*6/MM3 (ref 4.14–5.8)
RBC # UR STRIP: NORMAL /HPF
REF LAB TEST METHOD: NORMAL
SODIUM SERPL-SCNC: 140 MMOL/L (ref 136–145)
SP GR UR STRIP: >=1.03 (ref 1–1.03)
SQUAMOUS #/AREA URNS HPF: NORMAL /HPF
TRIGL SERPL-MCNC: 140 MG/DL (ref 0–150)
TSH SERPL DL<=0.05 MIU/L-ACNC: 4.28 UIU/ML (ref 0.27–4.2)
UROBILINOGEN UR QL STRIP: ABNORMAL
VIT B12 BLD-MCNC: 448 PG/ML (ref 211–946)
VLDLC SERPL-MCNC: 25 MG/DL (ref 5–40)
WBC # UR STRIP: NORMAL /HPF
WBC NRBC COR # BLD: 8.07 10*3/MM3 (ref 3.4–10.8)

## 2023-06-07 PROCEDURE — 82306 VITAMIN D 25 HYDROXY: CPT

## 2023-06-07 PROCEDURE — 82550 ASSAY OF CK (CPK): CPT

## 2023-06-07 PROCEDURE — 80050 GENERAL HEALTH PANEL: CPT

## 2023-06-07 PROCEDURE — 80061 LIPID PANEL: CPT

## 2023-06-07 PROCEDURE — G0103 PSA SCREENING: HCPCS

## 2023-06-07 PROCEDURE — 81001 URINALYSIS AUTO W/SCOPE: CPT

## 2023-06-07 PROCEDURE — 82570 ASSAY OF URINE CREATININE: CPT

## 2023-06-07 PROCEDURE — 82043 UR ALBUMIN QUANTITATIVE: CPT

## 2023-06-07 PROCEDURE — 82607 VITAMIN B-12: CPT

## 2023-06-07 PROCEDURE — 83036 HEMOGLOBIN GLYCOSYLATED A1C: CPT

## 2023-06-08 PROBLEM — R79.89 ELEVATED TSH: Status: ACTIVE | Noted: 2023-06-08

## 2023-06-08 PROBLEM — N18.2 CKD (CHRONIC KIDNEY DISEASE) STAGE 2, GFR 60-89 ML/MIN: Status: ACTIVE | Noted: 2023-06-08

## 2023-06-11 PROCEDURE — 87070 CULTURE OTHR SPECIMN AEROBIC: CPT | Performed by: NURSE PRACTITIONER

## 2023-06-11 PROCEDURE — 87205 SMEAR GRAM STAIN: CPT | Performed by: NURSE PRACTITIONER

## 2023-06-11 NOTE — ASSESSMENT & PLAN NOTE
There is no height or weight on file to calculate BMI.  with comorbid conditions include HTN, DM, hyperlipidemia, LBP; encouraged increased aerobic activity; moderation in portions; takes ozempic for DM; f/u in 3 mos

## 2023-06-11 NOTE — PROGRESS NOTES
"Chief Complaint   Patient presents with    Annual Exam    Diabetes    Hypertension    Hyperlipidemia       History of Present Illness  62 y.o.  gentleman presents for updated phys examination and f/u on DM, BP, and cholesterol. Not checking BGs regularly.    Was seen a few days ago with sore throat, sinus sxs, and cough. Reports left ear was retracted. Strep was negative. Was RX'd zpak and benzonatate. Is feeling better. Notes he has been sick for 3-4 wks.     Review of Systems  Denies headaches, visual changes, CP, palpitations, SOB, abd pain, n/v/d, difficulty with urination, numbness/tingling, falls, mood changes, lightheadedness, hearing changes, rashes.   All other ROS reviewed and negative.      Current Outpatient Medications:     aspirin 81 MG QD    azithromycin (Zithromax Z-Malachi) 250 MG QD for 3 more days    benzonatate (TESSALON) 200 MG BID prn    Cholecalciferol (VITAMIN D3) 5000 UNITS QD    empagliflozin (Jardiance) 25 MG QD    EPINEPHrine (EPIPEN) 0.3 MG/0.3ML solution prn    fenofibric acid (TRILIPIX) 135 MG QD    icosapent ethyl (Vascepa) 1 g 2 BID    metFORMIN  MG 4 QD    Multiple Vitamins-Minerals QD    rosuvastatin (CRESTOR) 20 MG QD    Semaglutide, 1 MG/DOSE, (OZEMPIC) weekly    VITALS:  /74   Pulse 68   Ht 172.7 cm (68\")   Wt 85.3 kg (188 lb)   SpO2 95%   BMI 28.59 kg/m²     Physical Exam  Vitals and nursing note reviewed.   Constitutional:       General: He is not in acute distress.     Appearance: Normal appearance. He is well-developed.   HENT:      Head: Normocephalic.      Right Ear: Tympanic membrane, ear canal and external ear normal. There is no impacted cerumen.      Left Ear: Tympanic membrane, ear canal and external ear normal. There is no impacted cerumen.      Nose: Nose normal.   Eyes:      Extraocular Movements: Extraocular movements intact.      Conjunctiva/sclera: Conjunctivae normal.      Pupils: Pupils are equal, round, and reactive to light.   Neck:     "  Vascular: No carotid bruit (bilaterally).   Cardiovascular:      Rate and Rhythm: Normal rate and regular rhythm.      Heart sounds: Normal heart sounds.      Comments: 2+ PT pulses bilaterally  Pulmonary:      Effort: Pulmonary effort is normal. No respiratory distress.      Breath sounds: Normal breath sounds. No wheezing, rhonchi or rales.   Abdominal:      General: Bowel sounds are normal. There is no distension.      Palpations: Abdomen is soft. There is no mass.      Tenderness: There is no abdominal tenderness.   Genitourinary:     Comments: Chaperone declined by patient; normal external genitalia, no ext hemorrhoids, prostate smooth, non enlarged, nontender, no nodules palpated    Musculoskeletal:      Cervical back: Normal range of motion and neck supple.      Right lower leg: No edema.      Left lower leg: No edema.   Lymphadenopathy:      Cervical: No cervical adenopathy.   Skin:     General: Skin is warm and dry.      Findings: No rash.   Neurological:      Mental Status: He is alert and oriented to person, place, and time.      Cranial Nerves: No cranial nerve deficit.      Gait: Gait normal.      Deep Tendon Reflexes: Reflexes normal.   Psychiatric:         Mood and Affect: Mood normal.         Behavior: Behavior normal.       LABS  Results for orders placed or performed during the hospital encounter of 06/11/23   Wound Culture - Swab, Oropharynx    Specimen: Oropharynx; Swab   Result Value Ref Range    Wound Culture Heavy growth (4+) Normal Throat Maryanne     Gram Stain Few (2+) Epithelial cells seen     Gram Stain Occasional WBCs seen     Gram Stain       Moderate (3+) Mixed bacterial morphotypes seen on Gram Stain   POC Rapid Strep A    Specimen: Swab   Result Value Ref Range    Rapid Strep A Screen Negative     Internal Control Passed     Lot Number [id number]     Expiration Date 10,182,024      6/7/23 stable CBC, CMP (),  CPK, B12, vit D 49.1, UA, microalb, lipids (, , HDL 34, LDL  56), PSA 1.4 except A1C 6.7, Ca 10.9, bord 1.28, GFR 63, TSH 4.28    3/23 A1C 6.6      ECG 12 Lead    Date/Time: 6/13/2023 9:38 AM  Performed by: Violette Hylton MD  Authorized by: Violette Hylton MD   Comparison: compared with previous ECG from 6/7/2022  Similar to previous ECG  Rhythm: sinus rhythm  Rate: normal  BPM: 72  Conduction: conduction normal  ST Segments: ST segments normal  T Waves: T waves normal  T inversion: III  QRS axis: normal  Clinical impression comment: stable EKG          ASSESSMENT/PLAN    Diagnoses and all orders for this visit:    1. PE (physical exam), routine (Primary)  Assessment & Plan:  Health maintenance - COVID19 vacc done, incl bivalent vaccine; flu vacc 10/22; PVX 9/16; Prev20 6/22; Td 2/20 (next Td due 2030), Shingrix and HAV done; CASSANDRA performed today, stable PSA 1.4; nl colonosc 9/20, repeat 2025 per Dr. Brandon; eye exam w/ Dr. Keith/Dr. Contreras 9/22 and 4/23, released back to opto; dental exam q6 mos; (+) seat belt use      2. Type 2 diabetes mellitus with both eyes affected by mild nonproliferative retinopathy without macular edema, without long-term current use of insulin  Assessment & Plan:  BG control mildly worsened with A1C 6.7; cont jardiance 25mg QD #90, 3RF, met ER 500mg 4 QD #360, 3RF, and ozempic 1mg weekly #3mo, 3RF; encouraged reg phys activity to decr insulin resistance, moderation in unhealthy starches/sweets; f/u A1C in 3 mos      Orders:  -     metFORMIN ER (GLUCOPHAGE-XR) 500 MG 24 hr tablet; Take 4 tablets by mouth Daily With Breakfast.  Dispense: 360 tablet; Refill: 3  -     empagliflozin (Jardiance) 25 MG tablet tablet; Take 1 tablet by mouth Daily.  Dispense: 90 tablet; Refill: 3  -     Semaglutide, 1 MG/DOSE, (Ozempic, 1 MG/DOSE,) 4 MG/3ML solution pen-injector; Inject 1 mg under the skin into the appropriate area as directed 1 (One) Time Per Week.  Dispense: 9 mL; Refill: 3    3. Hypertension  Assessment & Plan:  BP stable 124/74; no meds;  goal <  130/80    Orders:  -     ECG 12 Lead    4. Hyperlipidemia  Assessment & Plan:  Lipids stable with LDL 56; cont rosuvastatin 20mg QD #90, 3RF; also takes fenofibrate 135mg QD and vascepa 1gm 2 BID;    Orders:  -     rosuvastatin (CRESTOR) 20 MG tablet; Take 1 tablet by mouth Daily.  Dispense: 90 tablet; Refill: 3  -     icosapent ethyl (Vascepa) 1 g capsule capsule; Take 2 g by mouth 2 (Two) Times a Day With Meals.  Dispense: 360 capsule; Refill: 3  -     fenofibric acid (TRILIPIX) 135 MG capsule delayed-release delayed release capsule; Take 1 capsule by mouth Daily.  Dispense: 90 capsule; Refill: 3    5. Hypertriglyceridemia  Assessment & Plan:  TGs stable 140 with goal < 150; cont fenofibrate 135mg QD #90, 3RF and vascepa 1gm 2 BID #360, 3RF; also takes rosuvastatin 20mg QD    Orders:  -     icosapent ethyl (Vascepa) 1 g capsule capsule; Take 2 g by mouth 2 (Two) Times a Day With Meals.  Dispense: 360 capsule; Refill: 3  -     fenofibric acid (TRILIPIX) 135 MG capsule delayed-release delayed release capsule; Take 1 capsule by mouth Daily.  Dispense: 90 capsule; Refill: 3    6. Obesity  Assessment & Plan:  There is no height or weight on file to calculate BMI.  with comorbid conditions include HTN, DM, hyperlipidemia, LBP; encouraged increased aerobic activity; moderation in portions; takes ozempic for DM; f/u in 3 mos      7. Vitamin D deficiency  Assessment & Plan:  Stable vit D level 49.1 on 5000 units QD supplementation; however hold vit D supplement (and MVI) until hyperCa issue resolved      8. CKD (chronic kidney disease) stage 2, GFR 60-89 ml/min  Assessment & Plan:  Renal function bord with Cr 1.28, GFR 63; discussed importance of good BG and BP control as well as drinking enough water daily; repeat BMP in 3 mos      9. Elevated TSH  Assessment & Plan:  NEW finding; bord TSH 4.28; reviewed s/sxs hypothyroidism; follow clinically      10. Hypercalcemia  Assessment & Plan:  Recurrent elevated Ca 10.9; f/u BMP  with ionized Ca and PTH for f/u' previous hyper Ca was resolved after discontinuation of HCTZ    Orders:  -     Basic Metabolic Panel; Future  -     Calcium, Ionized; Future  -     PTH, Intact & Calcium; Future      FOLLOW-UP  Repeat BMP, ionized Ca, PTH (escribed) on the way out the door  RTC 3 mos with A1C    ADDENDUM - repeat BMP with worsened Ca 11.0 but Ca 10.7 with PTH 17; note vit D was 49 on PE labs; as prev advised, will have patient d/c vit D and MVI, drink more water, repeat labs in 1 week (BMP, PTH, 1,25-OH vit D, 25-OH vit D, PTH-rp, SPEP/UPEP (escribed)    Electronically signed by:    Violette Hylton MD, FACP  06/13/2023

## 2023-06-11 NOTE — ASSESSMENT & PLAN NOTE
Recurrent elevated Ca 10.9; f/u BMP with ionized Ca and PTH for f/u' previous hyper Ca was resolved after discontinuation of HCTZ

## 2023-06-11 NOTE — ASSESSMENT & PLAN NOTE
Renal function bord with Cr 1.28, GFR 63; discussed importance of good BG and BP control as well as drinking enough water daily; repeat BMP in 3 mos

## 2023-06-11 NOTE — ASSESSMENT & PLAN NOTE
Lipids stable with LDL 56; cont rosuvastatin 20mg QD #90, 3RF; also takes fenofibrate 135mg QD and vascepa 1gm 2 BID;

## 2023-06-11 NOTE — ASSESSMENT & PLAN NOTE
Stable vit D level 49.1 on 5000 units QD supplementation; however hold vit D supplement (and MVI) until hyperCa issue resolved

## 2023-06-11 NOTE — ASSESSMENT & PLAN NOTE
BG control mildly worsened with A1C 6.7; cont jardiance 25mg QD #90, 3RF, met ER 500mg 4 QD #360, 3RF, and ozempic 1mg weekly #3mo, 3RF; encouraged reg phys activity to decr insulin resistance, moderation in unhealthy starches/sweets; f/u A1C in 3 mos

## 2023-06-11 NOTE — ASSESSMENT & PLAN NOTE
Health maintenance - COVID19 vacc done, incl bivalent vaccine; flu vacc 10/22; PVX 9/16; Prev20 6/22; Td 2/20 (next Td due 2030), Shingrix and HAV done; CASSANDRA performed today, stable PSA 1.4; nl colonosc 9/20, repeat 2025 per Dr. Brandon; eye exam w/ Dr. eKith/Dr. Contreras 9/22 and 4/23, released back to opto; dental exam q6 mos; (+) seat belt use

## 2023-06-11 NOTE — ASSESSMENT & PLAN NOTE
TGs stable 140 with goal < 150; cont fenofibrate 135mg QD #90, 3RF and vascepa 1gm 2 BID #360, 3RF; also takes rosuvastatin 20mg QD   I agree with resident's assessment and findings. I have reviewed images and discussed case with resident. Ophthalmology will continue to follow.

## 2023-06-13 ENCOUNTER — OFFICE VISIT (OUTPATIENT)
Dept: INTERNAL MEDICINE | Facility: CLINIC | Age: 63
End: 2023-06-13
Payer: COMMERCIAL

## 2023-06-13 ENCOUNTER — LAB (OUTPATIENT)
Dept: LAB | Facility: HOSPITAL | Age: 63
End: 2023-06-13
Payer: COMMERCIAL

## 2023-06-13 VITALS
HEIGHT: 68 IN | SYSTOLIC BLOOD PRESSURE: 124 MMHG | HEART RATE: 68 BPM | DIASTOLIC BLOOD PRESSURE: 74 MMHG | BODY MASS INDEX: 28.49 KG/M2 | OXYGEN SATURATION: 95 % | WEIGHT: 188 LBS

## 2023-06-13 DIAGNOSIS — E83.52 HYPERCALCEMIA: Chronic | ICD-10-CM

## 2023-06-13 DIAGNOSIS — E66.09 CLASS 1 OBESITY DUE TO EXCESS CALORIES WITH SERIOUS COMORBIDITY AND BODY MASS INDEX (BMI) OF 30.0 TO 30.9 IN ADULT: Chronic | ICD-10-CM

## 2023-06-13 DIAGNOSIS — I10 ESSENTIAL HYPERTENSION: Chronic | ICD-10-CM

## 2023-06-13 DIAGNOSIS — E55.9 VITAMIN D DEFICIENCY: Chronic | ICD-10-CM

## 2023-06-13 DIAGNOSIS — E78.1 HYPERTRIGLYCERIDEMIA: Chronic | ICD-10-CM

## 2023-06-13 DIAGNOSIS — E11.3293 TYPE 2 DIABETES MELLITUS WITH BOTH EYES AFFECTED BY MILD NONPROLIFERATIVE RETINOPATHY WITHOUT MACULAR EDEMA, WITHOUT LONG-TERM CURRENT USE OF INSULIN: Chronic | ICD-10-CM

## 2023-06-13 DIAGNOSIS — R79.89 ELEVATED TSH: ICD-10-CM

## 2023-06-13 DIAGNOSIS — N18.2 CKD (CHRONIC KIDNEY DISEASE) STAGE 2, GFR 60-89 ML/MIN: ICD-10-CM

## 2023-06-13 DIAGNOSIS — Z00.00 PE (PHYSICAL EXAM), ROUTINE: Primary | Chronic | ICD-10-CM

## 2023-06-13 DIAGNOSIS — E78.2 MIXED HYPERLIPIDEMIA: Chronic | ICD-10-CM

## 2023-06-13 LAB
ANION GAP SERPL CALCULATED.3IONS-SCNC: 12.2 MMOL/L (ref 5–15)
BUN SERPL-MCNC: 15 MG/DL (ref 8–23)
BUN/CREAT SERPL: 13.9 (ref 7–25)
CA-I BLD-MCNC: 5.7 MG/DL (ref 4.6–5.4)
CA-I SERPL ISE-MCNC: 1.42 MMOL/L (ref 1.15–1.35)
CALCIUM SPEC-SCNC: 10.7 MG/DL (ref 8.6–10.5)
CALCIUM SPEC-SCNC: 11 MG/DL (ref 8.6–10.5)
CHLORIDE SERPL-SCNC: 105 MMOL/L (ref 98–107)
CO2 SERPL-SCNC: 22.8 MMOL/L (ref 22–29)
CREAT SERPL-MCNC: 1.08 MG/DL (ref 0.76–1.27)
EGFRCR SERPLBLD CKD-EPI 2021: 77.6 ML/MIN/1.73
GLUCOSE SERPL-MCNC: 114 MG/DL (ref 65–99)
POTASSIUM SERPL-SCNC: 4.5 MMOL/L (ref 3.5–5.2)
PTH-INTACT SERPL-MCNC: 17 PG/ML (ref 15–65)
SODIUM SERPL-SCNC: 140 MMOL/L (ref 136–145)

## 2023-06-13 PROCEDURE — 82330 ASSAY OF CALCIUM: CPT

## 2023-06-13 PROCEDURE — 80048 BASIC METABOLIC PNL TOTAL CA: CPT

## 2023-06-13 PROCEDURE — 83970 ASSAY OF PARATHORMONE: CPT

## 2023-06-13 RX ORDER — METFORMIN HYDROCHLORIDE 500 MG/1
2000 TABLET, EXTENDED RELEASE ORAL
Qty: 360 TABLET | Refills: 3 | Status: SHIPPED | OUTPATIENT
Start: 2023-06-13

## 2023-06-13 RX ORDER — ROSUVASTATIN CALCIUM 20 MG/1
20 TABLET, COATED ORAL DAILY
Qty: 90 TABLET | Refills: 3 | Status: SHIPPED | OUTPATIENT
Start: 2023-06-13

## 2023-06-13 RX ORDER — ICOSAPENT ETHYL 1000 MG/1
2 CAPSULE ORAL 2 TIMES DAILY WITH MEALS
Qty: 360 CAPSULE | Refills: 3 | Status: SHIPPED | OUTPATIENT
Start: 2023-06-13

## 2023-06-13 RX ORDER — SEMAGLUTIDE 1.34 MG/ML
1 INJECTION, SOLUTION SUBCUTANEOUS WEEKLY
Qty: 9 ML | Refills: 3 | Status: SHIPPED | OUTPATIENT
Start: 2023-06-13

## 2023-06-13 RX ORDER — FENOFIBRIC ACID 135 MG/1
135 CAPSULE, DELAYED RELEASE ORAL DAILY
Qty: 90 CAPSULE | Refills: 3 | Status: SHIPPED | OUTPATIENT
Start: 2023-06-13

## 2023-06-15 ENCOUNTER — TELEPHONE (OUTPATIENT)
Dept: INTERNAL MEDICINE | Facility: CLINIC | Age: 63
End: 2023-06-15
Payer: COMMERCIAL

## 2023-06-15 NOTE — TELEPHONE ENCOUNTER
----- Message from Violette Hylton MD sent at 6/13/2023 10:14 PM EDT -----  Calcium level remains borderline elevated. Stop vitamin D supplement and multivitamin for now as discussed. Drink more water daily.  Repeat follow-up labs in 1 week (blood and urine, nonfasting ok).

## 2023-09-07 ENCOUNTER — LAB (OUTPATIENT)
Dept: LAB | Facility: HOSPITAL | Age: 63
End: 2023-09-07
Payer: COMMERCIAL

## 2023-09-07 DIAGNOSIS — E83.52 HYPERCALCEMIA: Chronic | ICD-10-CM

## 2023-09-07 DIAGNOSIS — E11.3293 TYPE 2 DIABETES MELLITUS WITH BOTH EYES AFFECTED BY MILD NONPROLIFERATIVE RETINOPATHY WITHOUT MACULAR EDEMA, WITHOUT LONG-TERM CURRENT USE OF INSULIN: Chronic | ICD-10-CM

## 2023-09-07 LAB
25(OH)D3 SERPL-MCNC: 34.8 NG/ML (ref 30–100)
ANION GAP SERPL CALCULATED.3IONS-SCNC: 11.5 MMOL/L (ref 5–15)
BUN SERPL-MCNC: 16 MG/DL (ref 8–23)
BUN/CREAT SERPL: 13.7 (ref 7–25)
CALCIUM SPEC-SCNC: 9.9 MG/DL (ref 8.6–10.5)
CHLORIDE SERPL-SCNC: 104 MMOL/L (ref 98–107)
CO2 SERPL-SCNC: 22.5 MMOL/L (ref 22–29)
CREAT SERPL-MCNC: 1.17 MG/DL (ref 0.76–1.27)
EGFRCR SERPLBLD CKD-EPI 2021: 70.5 ML/MIN/1.73
GLUCOSE SERPL-MCNC: 106 MG/DL (ref 65–99)
HBA1C MFR BLD: 6.7 % (ref 4.8–5.6)
POTASSIUM SERPL-SCNC: 4.6 MMOL/L (ref 3.5–5.2)
SODIUM SERPL-SCNC: 138 MMOL/L (ref 136–145)

## 2023-09-07 PROCEDURE — 80048 BASIC METABOLIC PNL TOTAL CA: CPT

## 2023-09-07 PROCEDURE — 82306 VITAMIN D 25 HYDROXY: CPT

## 2023-09-07 PROCEDURE — 83036 HEMOGLOBIN GLYCOSYLATED A1C: CPT

## 2023-09-13 NOTE — ASSESSMENT & PLAN NOTE
BG control unchanged with A1C 6.7; cont jardiance 25mg QD, met ER 500mg 4 QD, and ozempic 1mg weekly; options are to increase Ozempic to 2mg vs add pioglitazone - pt chooses to add mariaelena 15mg QD #30, 3RF; also discussed option to combine Jardiance and met to Synjardy XR 12.5/1000 2 QD; encouraged reg phys activity to decr insulin resistance, moderation in unhealthy starches/sweets; f/u A1C in 3 mos

## 2023-09-13 NOTE — PROGRESS NOTES
"Chief Complaint   Patient presents with    Diabetes    Hypertension    Hyperlipidemia       History of Present Illness  62 y.o.  gentleman presents for DM, vitamin D, and electrolytes follow-up.  Reports fasting sugars 120s, none fasting 160s.  Walking for exercise but notes some knee pain.  Denies side effects with Ozempic.    Review of Systems  Denies CP, SOB, abd pain, n/v. All other ROS reviewed and negative.      Current Outpatient Medications:     aspirin 81 MG QD    Cholecalciferol (VITAMIN D3) 5000 UNITS QD    empagliflozin (Jardiance) 25 MG QD    EPINEPHrine (EPIPEN) 0.3 MG/0.3ML prn    fenofibric acid (TRILIPIX) 135 MG QD    icosapent ethyl (Vascepa) 1 g 2 BID    metFORMIN  MG 4 QD    Multiple Vitamins-Minerals QD    rosuvastatin (CRESTOR) 20 MG QD    Semaglutide (Ozempic, 1 MG/DOSE,) weekly    VITALS:  /72   Pulse 80   Ht 172.7 cm (68\")   Wt 85.3 kg (188 lb)   SpO2 98%   BMI 28.59 kg/m²     Physical Exam  Vitals and nursing note reviewed.   Constitutional:       General: He is not in acute distress.     Appearance: Normal appearance. He is not ill-appearing.   Eyes:      Extraocular Movements: Extraocular movements intact.      Conjunctiva/sclera: Conjunctivae normal.   Pulmonary:      Effort: Pulmonary effort is normal. No respiratory distress.   Neurological:      Mental Status: He is alert. Mental status is at baseline.   Psychiatric:         Mood and Affect: Mood normal.         Behavior: Behavior normal.       LABS  Results for orders placed or performed in visit on 09/07/23   Basic Metabolic Panel    Specimen: Blood   Result Value Ref Range    Glucose 106 (H) 65 - 99 mg/dL    BUN 16 8 - 23 mg/dL    Creatinine 1.17 0.76 - 1.27 mg/dL    Sodium 138 136 - 145 mmol/L    Potassium 4.6 3.5 - 5.2 mmol/L    Chloride 104 98 - 107 mmol/L    CO2 22.5 22.0 - 29.0 mmol/L    Calcium 9.9 8.6 - 10.5 mg/dL    BUN/Creatinine Ratio 13.7 7.0 - 25.0    Anion Gap 11.5 5.0 - 15.0 mmol/L    eGFR " 70.5 >60.0 mL/min/1.73   Vitamin D,25-Hydroxy    Specimen: Blood   Result Value Ref Range    25 Hydroxy, Vitamin D 34.8 30.0 - 100.0 ng/ml   Hemoglobin A1c    Specimen: Blood   Result Value Ref Range    Hemoglobin A1C 6.70 (H) 4.80 - 5.60 %     6/23 A1C 6.7    3/23 A1C 6.6    ASSESSMENT/PLAN    Diagnoses and all orders for this visit:    1. Type 2 diabetes mellitus with both eyes affected by mild nonproliferative retinopathy without macular edema, without long-term current use of insulin (Primary)  Assessment & Plan:  BG control unchanged with A1C 6.7; cont jardiance 25mg QD, met ER 500mg 4 QD, and ozempic 1mg weekly; options are to increase Ozempic to 2mg vs add pioglitazone - pt chooses to add mariaelena 15mg QD #30, 3RF; also discussed option to combine Jardiance and met to Synjardy XR 12.5/1000 2 QD; encouraged reg phys activity to decr insulin resistance, moderation in unhealthy starches/sweets; f/u A1C in 3 mos      Orders:  -     pioglitazone (ACTOS) 15 MG tablet; Take 1 tablet by mouth Daily.  Dispense: 30 tablet; Refill: 3    2. Hypertension  Assessment & Plan:  BP stable 128/72 no meds;  goal < 130/80      3. Vitamin D deficiency  Assessment & Plan:  Stalbe vit D 34.8; off of suppplementation      4. CKD (chronic kidney disease) stage 2, GFR 60-89 ml/min  Assessment & Plan:  Renalfunction stable with Cr 1.17, GFR 70.6      5. Hypercalcemia  Assessment & Plan:  Resolved Ca 9.9          FOLLOW-UP  Health maintenance - rec flu vacc and RSV vacc soon (pt states he will get at pharmacy), and new COVID19 vacc when available  RTC 3 mos with A1C    Electronically signed by:    Violette Hylton MD, FACP  09/14/2023

## 2023-09-14 ENCOUNTER — OFFICE VISIT (OUTPATIENT)
Dept: INTERNAL MEDICINE | Facility: CLINIC | Age: 63
End: 2023-09-14
Payer: COMMERCIAL

## 2023-09-14 VITALS
HEIGHT: 68 IN | WEIGHT: 188 LBS | HEART RATE: 80 BPM | SYSTOLIC BLOOD PRESSURE: 128 MMHG | DIASTOLIC BLOOD PRESSURE: 72 MMHG | OXYGEN SATURATION: 98 % | BODY MASS INDEX: 28.49 KG/M2

## 2023-09-14 DIAGNOSIS — E83.52 HYPERCALCEMIA: Chronic | ICD-10-CM

## 2023-09-14 DIAGNOSIS — N18.2 CKD (CHRONIC KIDNEY DISEASE) STAGE 2, GFR 60-89 ML/MIN: ICD-10-CM

## 2023-09-14 DIAGNOSIS — E11.3293 TYPE 2 DIABETES MELLITUS WITH BOTH EYES AFFECTED BY MILD NONPROLIFERATIVE RETINOPATHY WITHOUT MACULAR EDEMA, WITHOUT LONG-TERM CURRENT USE OF INSULIN: Primary | Chronic | ICD-10-CM

## 2023-09-14 DIAGNOSIS — E55.9 VITAMIN D DEFICIENCY: Chronic | ICD-10-CM

## 2023-09-14 DIAGNOSIS — I10 ESSENTIAL HYPERTENSION: Chronic | ICD-10-CM

## 2023-09-14 RX ORDER — PIOGLITAZONEHYDROCHLORIDE 15 MG/1
15 TABLET ORAL DAILY
Qty: 30 TABLET | Refills: 3 | Status: SHIPPED | OUTPATIENT
Start: 2023-09-14

## 2023-10-20 ENCOUNTER — CLINICAL SUPPORT (OUTPATIENT)
Dept: INTERNAL MEDICINE | Facility: CLINIC | Age: 63
End: 2023-10-20
Payer: COMMERCIAL

## 2023-10-20 DIAGNOSIS — Z23 NEED FOR INFLUENZA VACCINATION: Primary | ICD-10-CM

## 2023-10-20 PROCEDURE — 90686 IIV4 VACC NO PRSV 0.5 ML IM: CPT | Performed by: INTERNAL MEDICINE

## 2023-10-20 PROCEDURE — 90471 IMMUNIZATION ADMIN: CPT | Performed by: INTERNAL MEDICINE

## 2023-12-26 NOTE — PROGRESS NOTES
"Chief Complaint   Patient presents with    Diabetes    Hypertension       History of Present Illness  63 y.o.  male presents for DM follow-up. Home BGs have been 120s fasting, up to 140s nonfasting.    Review of Systems  Denies CP, SOB, visual changes. All other ROS reviewed and negative.    Current Outpatient Medications:     aspirin 81 MG QD    Cholecalciferol (VITAMIN D3) 5000 UNITS QD    empagliflozin (Jardiance) 25 MG QD    EPINEPHrine (EPIPEN) 0.3 MG/0.3ML prn    fenofibric acid (TRILIPIX) 135 MG QD    icosapent ethyl (Vascepa) 1 g 2 BID    metFORMIN  MG 4 QD    Multiple Vitamins QD    pioglitazone 15 MG QD    rosuvastatin 20 MG  QD    Semaglutide (Ozempic, 1 MG/DOSE) weekly      VITALS:  /88   Pulse 71   Ht 172.7 cm (68\")   Wt 87.1 kg (192 lb)   SpO2 98%   BMI 29.19 kg/m²     Physical Exam  Vitals and nursing note reviewed.   Constitutional:       General: He is not in acute distress.     Appearance: Normal appearance. He is not ill-appearing.   Eyes:      Extraocular Movements: Extraocular movements intact.      Conjunctiva/sclera: Conjunctivae normal.   Pulmonary:      Effort: Pulmonary effort is normal. No respiratory distress.   Neurological:      Mental Status: He is alert. Mental status is at baseline.   Psychiatric:         Mood and Affect: Mood normal.         Behavior: Behavior normal.         LABS  Results for orders placed or performed in visit on 01/03/24   POC Glycosylated Hemoglobin (Hb A1C)    Specimen: Blood   Result Value Ref Range    Hemoglobin A1C 6.6 (A) 4.5 - 5.7 %    Lot Number [id number]     Expiration Date 07/30/25      Results for orders placed or performed in visit on 09/07/23   Basic Metabolic Panel    Specimen: Blood   Result Value Ref Range    Glucose 106 (H) 65 - 99 mg/dL    BUN 16 8 - 23 mg/dL    Creatinine 1.17 0.76 - 1.27 mg/dL    Sodium 138 136 - 145 mmol/L    Potassium 4.6 3.5 - 5.2 mmol/L    Chloride 104 98 - 107 mmol/L    CO2 22.5 22.0 - 29.0 " mmol/L    Calcium 9.9 8.6 - 10.5 mg/dL    BUN/Creatinine Ratio 13.7 7.0 - 25.0    Anion Gap 11.5 5.0 - 15.0 mmol/L    eGFR 70.5 >60.0 mL/min/1.73   Vitamin D,25-Hydroxy    Specimen: Blood   Result Value Ref Range    25 Hydroxy, Vitamin D 34.8 30.0 - 100.0 ng/ml   Hemoglobin A1c    Specimen: Blood   Result Value Ref Range    Hemoglobin A1C 6.70 (H) 4.80 - 5.60 %       ASSESSMENT/PLAN    Diagnoses and all orders for this visit:    1. Type 2 diabetes mellitus with both eyes affected by mild nonproliferative retinopathy without macular edema, without long-term current use of insulin (Primary)  Assessment & Plan:  BG control bord/unchanged with A1C _6.6 cont met ER 2000mg QD, mariaelena 15mg QD #90, 1RF, Jardiance 25mg QD, and  Ozempic 1mg weekly; encouraged reg phys activity to decr insulin resistance, moderation in unhealthy starches/sweets; f/u A1C in 3 mos      Orders:  -     POC Glycosylated Hemoglobin (Hb A1C)  -     pioglitazone (ACTOS) 15 MG tablet; Take 1 tablet by mouth Daily.  Dispense: 90 tablet; Refill: 1    2. Hypertension  Assessment & Plan:  BP  mildly elevated today; no meds;  goal < 130/80; f/u in 3 mos      3. Vaccine counseling  Comments:  rec COVID19 vacc and RSV vacc - get at pharmacy        FOLLOW-UP  Health maintenance - flu vacc 10/23; rec COVID19 vacc and RSV vacc - counseling given  RTC 3 mos with A1C and BP check    Electronically signed by:    Violette Hylton MD, FACP  01/03/2024

## 2023-12-26 NOTE — ASSESSMENT & PLAN NOTE
BG control bord/unchanged with A1C _6.6 cont met ER 2000mg QD, mariaelena 15mg QD #90, 1RF, Jardiance 25mg QD, and  Ozempic 1mg weekly; encouraged reg phys activity to decr insulin resistance, moderation in unhealthy starches/sweets; f/u A1C in 3 mos

## 2024-01-03 ENCOUNTER — OFFICE VISIT (OUTPATIENT)
Dept: INTERNAL MEDICINE | Facility: CLINIC | Age: 64
End: 2024-01-03
Payer: COMMERCIAL

## 2024-01-03 VITALS
BODY MASS INDEX: 29.1 KG/M2 | DIASTOLIC BLOOD PRESSURE: 88 MMHG | OXYGEN SATURATION: 98 % | SYSTOLIC BLOOD PRESSURE: 144 MMHG | HEIGHT: 68 IN | WEIGHT: 192 LBS | HEART RATE: 71 BPM

## 2024-01-03 DIAGNOSIS — I10 ESSENTIAL HYPERTENSION: Chronic | ICD-10-CM

## 2024-01-03 DIAGNOSIS — Z71.85 VACCINE COUNSELING: ICD-10-CM

## 2024-01-03 DIAGNOSIS — E11.3293 TYPE 2 DIABETES MELLITUS WITH BOTH EYES AFFECTED BY MILD NONPROLIFERATIVE RETINOPATHY WITHOUT MACULAR EDEMA, WITHOUT LONG-TERM CURRENT USE OF INSULIN: Primary | Chronic | ICD-10-CM

## 2024-01-03 LAB
EXPIRATION DATE: ABNORMAL
HBA1C MFR BLD: 6.6 % (ref 4.5–5.7)
Lab: ABNORMAL

## 2024-01-03 RX ORDER — PIOGLITAZONEHYDROCHLORIDE 15 MG/1
15 TABLET ORAL DAILY
Qty: 90 TABLET | Refills: 1 | Status: SHIPPED | OUTPATIENT
Start: 2024-01-03

## 2024-03-28 ENCOUNTER — OFFICE VISIT (OUTPATIENT)
Dept: INTERNAL MEDICINE | Facility: CLINIC | Age: 64
End: 2024-03-28
Payer: COMMERCIAL

## 2024-03-28 VITALS
DIASTOLIC BLOOD PRESSURE: 72 MMHG | WEIGHT: 192.6 LBS | HEIGHT: 68 IN | SYSTOLIC BLOOD PRESSURE: 132 MMHG | HEART RATE: 81 BPM | OXYGEN SATURATION: 98 % | BODY MASS INDEX: 29.19 KG/M2 | TEMPERATURE: 98.3 F

## 2024-03-28 DIAGNOSIS — J02.9 SORE THROAT: ICD-10-CM

## 2024-03-28 DIAGNOSIS — R05.9 COUGH, UNSPECIFIED TYPE: ICD-10-CM

## 2024-03-28 DIAGNOSIS — J10.1 INFLUENZA A: ICD-10-CM

## 2024-03-28 LAB
EXPIRATION DATE: ABNORMAL
EXPIRATION DATE: NORMAL
FLUAV AG UPPER RESP QL IA.RAPID: DETECTED
FLUBV AG UPPER RESP QL IA.RAPID: NOT DETECTED
INTERNAL CONTROL: ABNORMAL
INTERNAL CONTROL: NORMAL
Lab: ABNORMAL
Lab: NORMAL
S PYO AG THROAT QL: NEGATIVE
SARS-COV-2 AG UPPER RESP QL IA.RAPID: NOT DETECTED

## 2024-03-28 PROCEDURE — 87880 STREP A ASSAY W/OPTIC: CPT | Performed by: INTERNAL MEDICINE

## 2024-03-28 PROCEDURE — 87428 SARSCOV & INF VIR A&B AG IA: CPT | Performed by: INTERNAL MEDICINE

## 2024-03-28 PROCEDURE — 99213 OFFICE O/P EST LOW 20 MIN: CPT | Performed by: INTERNAL MEDICINE

## 2024-03-28 RX ORDER — OSELTAMIVIR PHOSPHATE 75 MG/1
75 CAPSULE ORAL 2 TIMES DAILY
Qty: 10 CAPSULE | Refills: 0 | Status: SHIPPED | OUTPATIENT
Start: 2024-03-28

## 2024-03-28 NOTE — PROGRESS NOTES
"Subjective   Bradley Ortiz is a 63 y.o. male.   Chief Complaint   Patient presents with    Nasal Congestion    Cough     X4 days.     Fever    Fatigue       History of Present Illness   Fever, sore throat, body aches, neck discomfort, dry cough that started on Monday. No rash. No neck stiffness. No light sensitivity.   The following portions of the patient's history were reviewed and updated as appropriate: allergies, current medications, past family history, past medical history, past social history, past surgical history, and problem list.    Review of Systems   Constitutional:  Positive for chills and fever.   HENT:  Positive for sore throat.    Respiratory:  Positive for cough. Negative for shortness of breath.    Musculoskeletal:  Positive for myalgias.     /72   Pulse 81   Temp 98.3 °F (36.8 °C)   Ht 172.7 cm (68\")   Wt 87.4 kg (192 lb 9.6 oz)   SpO2 98%   BMI 29.28 kg/m²     Objective   Physical Exam  Vitals reviewed.   Cardiovascular:      Rate and Rhythm: Normal rate and regular rhythm.   Pulmonary:      Effort: No respiratory distress.      Breath sounds: No wheezing.   Neurological:      Mental Status: He is alert.         Assessment & Plan   Diagnoses and all orders for this visit:    1. Influenza A  -     oseltamivir (Tamiflu) 75 MG capsule; Take 1 capsule by mouth 2 (Two) Times a Day.  Dispense: 10 capsule; Refill: 0  He has diabetes so Tamiflu is indicated even though symptoms started 4 days ago.  Can alternate Tylenol with ibuprofen for fever. Tolerating fluids. If symptoms worsen call or to ED.  Flu a positive. Flu B negative. Covid negative  2. Cough, unspecified type  -     POCT SARS-CoV-2 Antigen JAVIER + Flu    3. Sore throat  -     POCT rapid strep A  Strep negative.               "

## 2024-04-08 NOTE — ASSESSMENT & PLAN NOTE
BG control unchanged with A1C 6.7; cont Ozempic 1mg weekly, Jardiance 25mg QD, and incr mariaelena to 30 mg QD #90, 0RF; he might want to check Mounjaro coverage; encouraged reg phys activity to decr insulin resistance, moderation in unhealthy starches/sweets; f/u A1C in 3 mos

## 2024-04-08 NOTE — PROGRESS NOTES
"Chief Complaint   Patient presents with    Hypertension    Diabetes       History of Present Illness  63 y.o.  male presents for f/u on DM. Just recovering from the flu. Reports fasting and nonfasting BGs 120-140s.    Not checking BPs at home.    Still with mild residual cough from flu.  Fevers have resolved.  Reports green drainage.    Review of Systems  Denies CP, SOB, visual changes. All other ROS reviewed and negative.    Current Outpatient Medications:     aspirin 81 MG QD    Cholecalciferol (VITAMIN D3) 5000 UNITS QD    empagliflozin (Jardiance) 25 MG QD    EPINEPHrine (EPIPEN) 0.3 MG/0.3ML prn    fenofibric acid 135 MG QD    icosapent ethyl (Vascepa) 1 g 2 BID    metFORMIN  MG 4 QD    Multiple Vitamins-Minerals QD    pioglitazone 15 MG QD    rosuvastatin 20 MG QD    Semaglutide, 1 MG/DOSE, (Ozempic) weekly    VITALS:  /78   Pulse 81   Temp 96.5 °F (35.8 °C)   Ht 172.7 cm (68\")   Wt 89 kg (196 lb 3.2 oz)   SpO2 98%   BMI 29.83 kg/m²   Repeat BP left arm 142/84    Physical Exam  Vitals and nursing note reviewed.   Constitutional:       General: He is not in acute distress.     Appearance: Normal appearance. He is not ill-appearing.   Eyes:      Extraocular Movements: Extraocular movements intact.      Conjunctiva/sclera: Conjunctivae normal.   Cardiovascular:      Rate and Rhythm: Normal rate and regular rhythm.   Pulmonary:      Effort: Pulmonary effort is normal. No respiratory distress.      Breath sounds: Normal breath sounds. No wheezing, rhonchi or rales.      Comments: No coughing during office visit, speaks in complete sentences  Neurological:      Mental Status: He is alert and oriented to person, place, and time. Mental status is at baseline.   Psychiatric:         Mood and Affect: Mood normal.         Behavior: Behavior normal.         LABS  Results for orders placed or performed in visit on 04/09/24   POC Glycosylated Hemoglobin (Hb A1C)    Specimen: Blood   Result Value " Ref Range    Hemoglobin A1C 6.7 (A) 4.5 - 5.7 %    Lot Number [id number]     Expiration Date 12/17/2025 1/24 A1C 6.6  9/23 A1C 6.7    ASSESSMENT/PLAN    Diagnoses and all orders for this visit:    1. Type 2 diabetes mellitus with both eyes affected by mild nonproliferative retinopathy without macular edema, without long-term current use of insulin (Primary)  Assessment & Plan:  BG control unchanged with A1C 6.7; cont Ozempic 1mg weekly, Jardiance 25mg QD, and incr mariaelena to 30 mg QD #90, 0RF; he might want to check Mounjaro coverage; encouraged reg phys activity to decr insulin resistance, moderation in unhealthy starches/sweets; f/u A1C in 3 mos      Orders:  -     POC Glycosylated Hemoglobin (Hb A1C)  -     pioglitazone (ACTOS) 30 MG tablet; Take 1 tablet by mouth Daily.  Dispense: 90 tablet; Refill: 0    2. Hypertension  Assessment & Plan:  BP mildly elevated; no meds; rec start home BP monitoring with goal < 130/80; f/u in 3 mos with next PE and plan on BP med if still elevated      3. Vaccine counseling  Comments:  rec updated COVID19 vacc; rec RSV vacc - get at pharmacy    4. Influenza A  Comments:  residual cough and green drainage; cont flonase, antihistamine, and mucinex; add nasal saline spray BID; s/p tamiflu        FOLLOW-UP  Health maintenance - flu vacc 10/23; rec COVID19 vacc and RSV vacc, counseling given  RTC for PE 6/17/24; fasting labs prior to appt (CBC, CMP, TSH, lipids, UA/micr, microalb, A1C, PSA, CPK, vit D, B12) and BP check    Electronically signed by:    Violette Hylton MD, FACP  04/09/2024    EMR Dragon/Transcription Utilization  Please note that portions of this note were completed with a voice recognition program.

## 2024-04-08 NOTE — ASSESSMENT & PLAN NOTE
BP mildly elevated; no meds; rec start home BP monitoring with goal < 130/80; f/u in 3 mos with next PE and plan on BP med if still elevated

## 2024-04-09 ENCOUNTER — OFFICE VISIT (OUTPATIENT)
Dept: INTERNAL MEDICINE | Facility: CLINIC | Age: 64
End: 2024-04-09
Payer: COMMERCIAL

## 2024-04-09 VITALS
DIASTOLIC BLOOD PRESSURE: 78 MMHG | HEIGHT: 68 IN | OXYGEN SATURATION: 98 % | TEMPERATURE: 96.5 F | BODY MASS INDEX: 29.73 KG/M2 | WEIGHT: 196.2 LBS | HEART RATE: 81 BPM | SYSTOLIC BLOOD PRESSURE: 144 MMHG

## 2024-04-09 DIAGNOSIS — J10.1 INFLUENZA A: ICD-10-CM

## 2024-04-09 DIAGNOSIS — E11.3293 TYPE 2 DIABETES MELLITUS WITH BOTH EYES AFFECTED BY MILD NONPROLIFERATIVE RETINOPATHY WITHOUT MACULAR EDEMA, WITHOUT LONG-TERM CURRENT USE OF INSULIN: Primary | Chronic | ICD-10-CM

## 2024-04-09 DIAGNOSIS — Z71.85 VACCINE COUNSELING: ICD-10-CM

## 2024-04-09 DIAGNOSIS — I10 ESSENTIAL HYPERTENSION: Chronic | ICD-10-CM

## 2024-04-09 LAB
EXPIRATION DATE: ABNORMAL
HBA1C MFR BLD: 6.7 % (ref 4.5–5.7)
Lab: ABNORMAL

## 2024-04-09 PROCEDURE — 99214 OFFICE O/P EST MOD 30 MIN: CPT | Performed by: INTERNAL MEDICINE

## 2024-04-09 PROCEDURE — 83036 HEMOGLOBIN GLYCOSYLATED A1C: CPT | Performed by: INTERNAL MEDICINE

## 2024-04-09 RX ORDER — PIOGLITAZONEHYDROCHLORIDE 30 MG/1
30 TABLET ORAL DAILY
Qty: 90 TABLET | Refills: 0 | Status: SHIPPED | OUTPATIENT
Start: 2024-04-09

## 2024-05-24 DIAGNOSIS — Z00.00 PE (PHYSICAL EXAM), ROUTINE: Primary | Chronic | ICD-10-CM

## 2024-05-24 DIAGNOSIS — E11.3293 TYPE 2 DIABETES MELLITUS WITH BOTH EYES AFFECTED BY MILD NONPROLIFERATIVE RETINOPATHY WITHOUT MACULAR EDEMA, WITHOUT LONG-TERM CURRENT USE OF INSULIN: Chronic | ICD-10-CM

## 2024-05-24 DIAGNOSIS — Z12.5 SCREENING FOR PROSTATE CANCER: ICD-10-CM

## 2024-05-24 DIAGNOSIS — E55.9 VITAMIN D DEFICIENCY: Chronic | ICD-10-CM

## 2024-05-24 DIAGNOSIS — E78.1 HYPERTRIGLYCERIDEMIA: Chronic | ICD-10-CM

## 2024-06-02 DIAGNOSIS — E78.2 MIXED HYPERLIPIDEMIA: Chronic | ICD-10-CM

## 2024-06-02 DIAGNOSIS — E11.3293 TYPE 2 DIABETES MELLITUS WITH BOTH EYES AFFECTED BY MILD NONPROLIFERATIVE RETINOPATHY WITHOUT MACULAR EDEMA, WITHOUT LONG-TERM CURRENT USE OF INSULIN: Chronic | ICD-10-CM

## 2024-06-03 RX ORDER — ROSUVASTATIN CALCIUM 20 MG/1
20 TABLET, COATED ORAL DAILY
Qty: 90 TABLET | Refills: 0 | OUTPATIENT
Start: 2024-06-03

## 2024-06-03 RX ORDER — EMPAGLIFLOZIN 25 MG/1
25 TABLET, FILM COATED ORAL DAILY
Qty: 90 TABLET | Refills: 0 | OUTPATIENT
Start: 2024-06-03

## 2024-06-03 RX ORDER — METFORMIN HYDROCHLORIDE 500 MG/1
TABLET, EXTENDED RELEASE ORAL
Qty: 360 TABLET | Refills: 0 | OUTPATIENT
Start: 2024-06-03

## 2024-06-12 ENCOUNTER — LAB (OUTPATIENT)
Dept: LAB | Facility: HOSPITAL | Age: 64
End: 2024-06-12
Payer: COMMERCIAL

## 2024-06-12 DIAGNOSIS — E78.1 HYPERTRIGLYCERIDEMIA: Chronic | ICD-10-CM

## 2024-06-12 DIAGNOSIS — E11.3293 TYPE 2 DIABETES MELLITUS WITH BOTH EYES AFFECTED BY MILD NONPROLIFERATIVE RETINOPATHY WITHOUT MACULAR EDEMA, WITHOUT LONG-TERM CURRENT USE OF INSULIN: Chronic | ICD-10-CM

## 2024-06-12 DIAGNOSIS — E55.9 VITAMIN D DEFICIENCY: Chronic | ICD-10-CM

## 2024-06-12 DIAGNOSIS — Z00.00 PE (PHYSICAL EXAM), ROUTINE: ICD-10-CM

## 2024-06-12 DIAGNOSIS — Z12.5 SCREENING FOR PROSTATE CANCER: ICD-10-CM

## 2024-06-12 PROBLEM — R79.89 LOW VITAMIN B12 LEVEL: Status: ACTIVE | Noted: 2024-06-12

## 2024-06-12 LAB
25(OH)D3 SERPL-MCNC: 41.3 NG/ML (ref 30–100)
ALBUMIN SERPL-MCNC: 4.3 G/DL (ref 3.5–5.2)
ALBUMIN UR-MCNC: <1.2 MG/DL
ALBUMIN/GLOB SERPL: 2 G/DL
ALP SERPL-CCNC: 28 U/L (ref 39–117)
ALT SERPL W P-5'-P-CCNC: 17 U/L (ref 1–41)
ANION GAP SERPL CALCULATED.3IONS-SCNC: 9.1 MMOL/L (ref 5–15)
AST SERPL-CCNC: 16 U/L (ref 1–40)
BACTERIA UR QL AUTO: NORMAL /HPF
BASOPHILS # BLD AUTO: 0.05 10*3/MM3 (ref 0–0.2)
BASOPHILS NFR BLD AUTO: 0.9 % (ref 0–1.5)
BILIRUB SERPL-MCNC: 0.4 MG/DL (ref 0–1.2)
BILIRUB UR QL STRIP: NEGATIVE
BUN SERPL-MCNC: 19 MG/DL (ref 8–23)
BUN/CREAT SERPL: 16.5 (ref 7–25)
CALCIUM SPEC-SCNC: 9.6 MG/DL (ref 8.6–10.5)
CHLORIDE SERPL-SCNC: 105 MMOL/L (ref 98–107)
CHOLEST SERPL-MCNC: 107 MG/DL (ref 0–200)
CK SERPL-CCNC: 158 U/L (ref 20–200)
CLARITY UR: CLEAR
CO2 SERPL-SCNC: 23.9 MMOL/L (ref 22–29)
COLOR UR: YELLOW
CREAT SERPL-MCNC: 1.15 MG/DL (ref 0.76–1.27)
CREAT UR-MCNC: 98.2 MG/DL
DEPRECATED RDW RBC AUTO: 44.1 FL (ref 37–54)
EGFRCR SERPLBLD CKD-EPI 2021: 71.5 ML/MIN/1.73
EOSINOPHIL # BLD AUTO: 0.16 10*3/MM3 (ref 0–0.4)
EOSINOPHIL NFR BLD AUTO: 2.8 % (ref 0.3–6.2)
ERYTHROCYTE [DISTWIDTH] IN BLOOD BY AUTOMATED COUNT: 14.5 % (ref 12.3–15.4)
GLOBULIN UR ELPH-MCNC: 2.2 GM/DL
GLUCOSE SERPL-MCNC: 104 MG/DL (ref 65–99)
GLUCOSE UR STRIP-MCNC: ABNORMAL MG/DL
HBA1C MFR BLD: 6 % (ref 4.8–5.6)
HCT VFR BLD AUTO: 45.2 % (ref 37.5–51)
HDLC SERPL-MCNC: 40 MG/DL (ref 40–60)
HGB BLD-MCNC: 15.1 G/DL (ref 13–17.7)
HGB UR QL STRIP.AUTO: NEGATIVE
HYALINE CASTS UR QL AUTO: NORMAL /LPF
IMM GRANULOCYTES # BLD AUTO: 0.02 10*3/MM3 (ref 0–0.05)
IMM GRANULOCYTES NFR BLD AUTO: 0.3 % (ref 0–0.5)
KETONES UR QL STRIP: NEGATIVE
LDLC SERPL CALC-MCNC: 51 MG/DL (ref 0–100)
LDLC/HDLC SERPL: 1.29 {RATIO}
LEUKOCYTE ESTERASE UR QL STRIP.AUTO: NEGATIVE
LYMPHOCYTES # BLD AUTO: 2.85 10*3/MM3 (ref 0.7–3.1)
LYMPHOCYTES NFR BLD AUTO: 49.1 % (ref 19.6–45.3)
MCH RBC QN AUTO: 28.1 PG (ref 26.6–33)
MCHC RBC AUTO-ENTMCNC: 33.4 G/DL (ref 31.5–35.7)
MCV RBC AUTO: 84.2 FL (ref 79–97)
MICROALBUMIN/CREAT UR: NORMAL MG/G{CREAT}
MONOCYTES # BLD AUTO: 0.5 10*3/MM3 (ref 0.1–0.9)
MONOCYTES NFR BLD AUTO: 8.6 % (ref 5–12)
NEUTROPHILS NFR BLD AUTO: 2.22 10*3/MM3 (ref 1.7–7)
NEUTROPHILS NFR BLD AUTO: 38.3 % (ref 42.7–76)
NITRITE UR QL STRIP: NEGATIVE
NRBC BLD AUTO-RTO: 0 /100 WBC (ref 0–0.2)
PH UR STRIP.AUTO: 5.5 [PH] (ref 5–8)
PLATELET # BLD AUTO: 212 10*3/MM3 (ref 140–450)
PMV BLD AUTO: 10.5 FL (ref 6–12)
POTASSIUM SERPL-SCNC: 4.4 MMOL/L (ref 3.5–5.2)
PROT SERPL-MCNC: 6.5 G/DL (ref 6–8.5)
PROT UR QL STRIP: NEGATIVE
PSA SERPL-MCNC: 1.13 NG/ML (ref 0–4)
RBC # BLD AUTO: 5.37 10*6/MM3 (ref 4.14–5.8)
RBC # UR STRIP: NORMAL /HPF
REF LAB TEST METHOD: NORMAL
SODIUM SERPL-SCNC: 138 MMOL/L (ref 136–145)
SP GR UR STRIP: >1.03 (ref 1–1.03)
SQUAMOUS #/AREA URNS HPF: NORMAL /HPF
TRIGL SERPL-MCNC: 77 MG/DL (ref 0–150)
TSH SERPL DL<=0.05 MIU/L-ACNC: 3 UIU/ML (ref 0.27–4.2)
UROBILINOGEN UR QL STRIP: ABNORMAL
VIT B12 BLD-MCNC: 231 PG/ML (ref 211–946)
VLDLC SERPL-MCNC: 16 MG/DL (ref 5–40)
WBC # UR STRIP: NORMAL /HPF
WBC NRBC COR # BLD AUTO: 5.8 10*3/MM3 (ref 3.4–10.8)

## 2024-06-12 PROCEDURE — 82570 ASSAY OF URINE CREATININE: CPT

## 2024-06-12 PROCEDURE — 81001 URINALYSIS AUTO W/SCOPE: CPT

## 2024-06-12 PROCEDURE — 80050 GENERAL HEALTH PANEL: CPT

## 2024-06-12 PROCEDURE — 82043 UR ALBUMIN QUANTITATIVE: CPT

## 2024-06-12 PROCEDURE — 82550 ASSAY OF CK (CPK): CPT

## 2024-06-12 PROCEDURE — G0103 PSA SCREENING: HCPCS

## 2024-06-12 PROCEDURE — 80061 LIPID PANEL: CPT

## 2024-06-12 PROCEDURE — 82306 VITAMIN D 25 HYDROXY: CPT

## 2024-06-12 PROCEDURE — 83036 HEMOGLOBIN GLYCOSYLATED A1C: CPT

## 2024-06-12 PROCEDURE — 82607 VITAMIN B-12: CPT

## 2024-06-14 NOTE — ASSESSMENT & PLAN NOTE
Lipids stable with LDL 51; cont rosuvastatin 20mg QD #90, 3RF; also takes fenofibrate 135mg QD and vascepa 1gm 2 BID;

## 2024-06-14 NOTE — ASSESSMENT & PLAN NOTE
Health maintenance - rec COVID19 vacc, counseling given (declined); flu vacc 10/23; RSV 1/24; PVX 9/16; Prev20 6/22; Td 2/20, (next Tdap due 2030); Shingrix and HAV done; CASSANDRA deferred; nl colonosc 9/20, repeat 2025 per Dr. Brandon; eye exam w/ Dr. Acosta 10/25/23 ([facility])/ Dr. Contreras 4/23; dental exam q6 mos; (+) seat belt use

## 2024-06-14 NOTE — ASSESSMENT & PLAN NOTE
BG control improved with A1C 6.0; commended patient; cont Ozempic 1mg weekly #3mo, 3RF, Jardiance 25mg QD, #90, 3RF, and mariaelena to 30 mg QD #90, 3RF; encouraged reg phys activity to decr insulin resistance, moderation in unhealthy starches/sweets; f/u A1C in 3 mos

## 2024-06-14 NOTE — ASSESSMENT & PLAN NOTE
BP bord/stable 136/80, worsened on repeat; no meds; cont home monitoring with goal < 130/80; f/u in 3 mos

## 2024-06-14 NOTE — ASSESSMENT & PLAN NOTE
TGs  at goal 128 with goal < 150; cont fenofibrate 135mg QD #90, 3RF and vascepa 1gm 2 BID #360, 3RF; also takes rosuvastatin 20mg QD #90, 3RF

## 2024-06-14 NOTE — PROGRESS NOTES
"Chief Complaint   Patient presents with    Annual Exam    Diabetes    Hypertension       History of Present Illness  63 y.o.  male presents for updated phys examination and f/u on DM, cholesterol, BP, and vitamin levels.    FBGs 120-130s, nonfasting BGs 140s. Home BPs mostly 120-130s/70s.    Reports awakening this AM with left wrist pain (ulnar side). Denies injury over the weekend; has not been doing repetitive movement activities. Thinks it is a little swollen. Patient is R handed.    Review of Systems  Denies headaches, visual changes, CP, palpitations, SOB, cough, abd pain, n/v/d, difficulty with urination, numbness/tingling, falls, mood changes, lightheadedness, hearing changes, rashes.  ROS (+) for acute ulnar head wrist pain with mild swelling. No injuries or falls.   All other ROS reviewed and negative.    Current Outpatient Medications:     aspirin 81 MG QD    Cholecalciferol (VITAMIN D3) 5000 UNITS QD    empagliflozin (Jardiance) 25 MG QD    EPINEPHrine (EPIPEN) 0.3 MG/0.3ML prn    fenofibric acid 135 MG QD    icosapent ethyl (Vascepa) 1 g 2 BID    metFORMIN  MG 4 QD    Multiple Vitamins-Minerals QD    pioglitazone 30 MG  QD    rosuvastatin 20 MG QD    Semaglutide, 1 MG/DOSE, (Ozempic ) weekly    VITALS:  /80 (BP Location: Left arm, Patient Position: Sitting, Cuff Size: Adult)   Pulse 76   Temp 97.5 °F (36.4 °C) (Temporal)   Resp 18   Ht 172.7 cm (68\")   Wt 87.5 kg (193 lb)   SpO2 97%   BMI 29.35 kg/m²   Repeat BP worsened 148/82 (exac'd by wrist pain)  Physical Exam  Vitals and nursing note reviewed.   Constitutional:       General: He is not in acute distress.     Appearance: Normal appearance. He is well-developed.   HENT:      Head: Normocephalic.      Right Ear: Tympanic membrane, ear canal and external ear normal.      Left Ear: Tympanic membrane, ear canal and external ear normal.      Nose: Nose normal.   Eyes:      Extraocular Movements: Extraocular movements intact.    "   Conjunctiva/sclera: Conjunctivae normal.      Pupils: Pupils are equal, round, and reactive to light.   Neck:      Vascular: No carotid bruit (bilaterally).   Cardiovascular:      Rate and Rhythm: Normal rate and regular rhythm.      Heart sounds: Normal heart sounds.      Comments: 2+ DP pulses bilaterally  Pulmonary:      Effort: Pulmonary effort is normal. No respiratory distress.      Breath sounds: Normal breath sounds. No wheezing or rales.   Abdominal:      General: Bowel sounds are normal. There is no distension.      Palpations: Abdomen is soft. There is no mass.      Tenderness: There is no abdominal tenderness.   Musculoskeletal:         General: Tenderness (left ulnar wrist adjcent to ulnar head, mild swelling, no erythema or increased warmth; increased pain with ulnar deviation, mild with radial deviation; flexion/extension intact) present.      Cervical back: Normal range of motion and neck supple.      Right lower leg: No edema.      Left lower leg: No edema.      Comments: 2 radial pulses bilaterally   Lymphadenopathy:      Cervical: No cervical adenopathy.   Skin:     General: Skin is warm and dry.      Findings: No rash.   Neurological:      Mental Status: He is alert and oriented to person, place, and time.      Gait: Gait normal.   Psychiatric:         Mood and Affect: Mood normal.         Behavior: Behavior normal.         LABS  Results for orders placed or performed in visit on 06/12/24   Comprehensive Metabolic Panel    Specimen: Blood   Result Value Ref Range    Glucose 104 (H) 65 - 99 mg/dL    BUN 19 8 - 23 mg/dL    Creatinine 1.15 0.76 - 1.27 mg/dL    Sodium 138 136 - 145 mmol/L    Potassium 4.4 3.5 - 5.2 mmol/L    Chloride 105 98 - 107 mmol/L    CO2 23.9 22.0 - 29.0 mmol/L    Calcium 9.6 8.6 - 10.5 mg/dL    Total Protein 6.5 6.0 - 8.5 g/dL    Albumin 4.3 3.5 - 5.2 g/dL    ALT (SGPT) 17 1 - 41 U/L    AST (SGOT) 16 1 - 40 U/L    Alkaline Phosphatase 28 (L) 39 - 117 U/L    Total Bilirubin  0.4 0.0 - 1.2 mg/dL    Globulin 2.2 gm/dL    A/G Ratio 2.0 g/dL    BUN/Creatinine Ratio 16.5 7.0 - 25.0    Anion Gap 9.1 5.0 - 15.0 mmol/L    eGFR 71.5 >60.0 mL/min/1.73   Lipid Panel    Specimen: Blood   Result Value Ref Range    Total Cholesterol 107 0 - 200 mg/dL    Triglycerides 77 0 - 150 mg/dL    HDL Cholesterol 40 40 - 60 mg/dL    LDL Cholesterol  51 0 - 100 mg/dL    VLDL Cholesterol 16 5 - 40 mg/dL    LDL/HDL Ratio 1.29    TSH    Specimen: Blood   Result Value Ref Range    TSH 3.000 0.270 - 4.200 uIU/mL   Microalbumin / Creatinine Urine Ratio - Urine, Clean Catch    Specimen: Urine, Clean Catch   Result Value Ref Range    Microalbumin/Creatinine Ratio      Creatinine, Urine 98.2 mg/dL    Microalbumin, Urine <1.2 mg/dL   Hemoglobin A1c    Specimen: Blood   Result Value Ref Range    Hemoglobin A1C 6.00 (H) 4.80 - 5.60 %   PSA Screen    Specimen: Blood   Result Value Ref Range    PSA 1.130 0.000 - 4.000 ng/mL   CK    Specimen: Blood   Result Value Ref Range    Creatine Kinase 158 20 - 200 U/L   Vitamin D,25-Hydroxy    Specimen: Blood   Result Value Ref Range    25 Hydroxy, Vitamin D 41.3 30.0 - 100.0 ng/ml   Vitamin B12    Specimen: Blood   Result Value Ref Range    Vitamin B-12 231 211 - 946 pg/mL   CBC Auto Differential    Specimen: Blood   Result Value Ref Range    WBC 5.80 3.40 - 10.80 10*3/mm3    RBC 5.37 4.14 - 5.80 10*6/mm3    Hemoglobin 15.1 13.0 - 17.7 g/dL    Hematocrit 45.2 37.5 - 51.0 %    MCV 84.2 79.0 - 97.0 fL    MCH 28.1 26.6 - 33.0 pg    MCHC 33.4 31.5 - 35.7 g/dL    RDW 14.5 12.3 - 15.4 %    RDW-SD 44.1 37.0 - 54.0 fl    MPV 10.5 6.0 - 12.0 fL    Platelets 212 140 - 450 10*3/mm3    Neutrophil % 38.3 (L) 42.7 - 76.0 %    Lymphocyte % 49.1 (H) 19.6 - 45.3 %    Monocyte % 8.6 5.0 - 12.0 %    Eosinophil % 2.8 0.3 - 6.2 %    Basophil % 0.9 0.0 - 1.5 %    Immature Grans % 0.3 0.0 - 0.5 %    Neutrophils, Absolute 2.22 1.70 - 7.00 10*3/mm3    Lymphocytes, Absolute 2.85 0.70 - 3.10 10*3/mm3     Monocytes, Absolute 0.50 0.10 - 0.90 10*3/mm3    Eosinophils, Absolute 0.16 0.00 - 0.40 10*3/mm3    Basophils, Absolute 0.05 0.00 - 0.20 10*3/mm3    Immature Grans, Absolute 0.02 0.00 - 0.05 10*3/mm3    nRBC 0.0 0.0 - 0.2 /100 WBC   Urinalysis without microscopic (no culture) - Urine, Clean Catch    Specimen: Urine, Clean Catch   Result Value Ref Range    Color, UA Yellow Yellow, Straw    Appearance, UA Clear Clear    pH, UA 5.5 5.0 - 8.0    Specific Gravity, UA >1.030 (H) 1.005 - 1.030    Glucose, UA >=1000 mg/dL (3+) (A) Negative    Ketones, UA Negative Negative    Bilirubin, UA Negative Negative    Blood, UA Negative Negative    Protein, UA Negative Negative    Leuk Esterase, UA Negative Negative    Nitrite, UA Negative Negative    Urobilinogen, UA 0.2 E.U./dL 0.2 - 1.0 E.U./dL   Urinalysis, Microscopic Only - Urine, Clean Catch    Specimen: Urine, Clean Catch   Result Value Ref Range    RBC, UA 0-2 None Seen, 0-2 /HPF    WBC, UA 0-2 None Seen, 0-2 /HPF    Bacteria, UA None Seen None Seen /HPF    Squamous Epithelial Cells, UA 0-2 None Seen, 0-2 /HPF    Hyaline Casts, UA None Seen None Seen /LPF    Methodology Automated Microscopy      4/24 A1C 6.7      ECG 12 Lead    Date/Time: 6/17/2024 9:50 AM  Performed by: Violette Hylton MD    Authorized by: Violette Hylton MD  Comparison: compared with previous ECG from 6/13/2023  Similar to previous ECG  Rhythm: sinus rhythm  Rate: normal  BPM: 72  Conduction: conduction normal  ST Segments: ST segments normal  T Waves: T waves normal  T inversion: III  QRS axis: normal  Other findings: poor R wave progression  Clinical impression comment: stable EKG            ASSESSMENT/PLAN    Diagnoses and all orders for this visit:    1. PE (physical exam), routine (Primary)  Assessment & Plan:  Health maintenance - rec COVID19 vacc, counseling given (declined); flu vacc 10/23; RSV 1/24; PVX 9/16; Prev20 6/22; Td 2/20, (next Tdap due 2030); Shingrix and HAV done; CASSANDRA deferred; nl  colonosc 9/20, repeat 2025 per Dr. Brandon; eye exam w/ Dr. Acosta 10/25/23 ([facility])/ Dr. Contreras 4/23; dental exam q6 mos; (+) seat belt use      2. Hypertension  Assessment & Plan:  BP bord/stable 136/80, worsened on repeat; no meds; cont home monitoring with goal < 130/80; f/u in 3 mos    Orders:  -     ECG 12 Lead    3. Type 2 diabetes mellitus with both eyes affected by mild nonproliferative retinopathy without macular edema, without long-term current use of insulin  Assessment & Plan:  BG control improved with A1C 6.0; commended patient; cont Ozempic 1mg weekly #3mo, 3RF, Jardiance 25mg QD, #90, 3RF, and mariaelena to 30 mg QD #90, 3RF; encouraged reg phys activity to decr insulin resistance, moderation in unhealthy starches/sweets; f/u A1C in 3 mos        Orders:  -     Hemoglobin A1c; Future  -     empagliflozin (Jardiance) 25 MG tablet tablet; Take 1 tablet by mouth Daily.  Dispense: 90 tablet; Refill: 3  -     metFORMIN ER (GLUCOPHAGE-XR) 500 MG 24 hr tablet; Take 4 tablets by mouth Daily With Breakfast.  Dispense: 360 tablet; Refill: 3  -     pioglitazone (ACTOS) 30 MG tablet; Take 1 tablet by mouth Daily.  Dispense: 90 tablet; Refill: 3  -     Semaglutide, 1 MG/DOSE, (Ozempic, 1 MG/DOSE,) 4 MG/3ML solution pen-injector; Inject 1 mg under the skin into the appropriate area as directed 1 (One) Time Per Week.  Dispense: 9 mL; Refill: 3    4. Hyperlipidemia  Assessment & Plan:  Lipids stable with LDL 51; cont rosuvastatin 20mg QD #90, 3RF; also takes fenofibrate 135mg QD and vascepa 1gm 2 BID;    Orders:  -     fenofibric acid (TRILIPIX) 135 MG capsule delayed-release delayed release capsule; Take 1 capsule by mouth Daily.  Dispense: 90 capsule; Refill: 3  -     icosapent ethyl (Vascepa) 1 g capsule capsule; Take 2 g by mouth 2 (Two) Times a Day With Meals.  Dispense: 360 capsule; Refill: 3  -     rosuvastatin (CRESTOR) 20 MG tablet; Take 1 tablet by mouth Daily.  Dispense: 90 tablet; Refill: 3    5.  Hypertriglyceridemia  Assessment & Plan:  TGs  at goal 128 with goal < 150; cont fenofibrate 135mg QD #90, 3RF and vascepa 1gm 2 BID #360, 3RF; also takes rosuvastatin 20mg QD #90, 3RF    Orders:  -     fenofibric acid (TRILIPIX) 135 MG capsule delayed-release delayed release capsule; Take 1 capsule by mouth Daily.  Dispense: 90 capsule; Refill: 3  -     icosapent ethyl (Vascepa) 1 g capsule capsule; Take 2 g by mouth 2 (Two) Times a Day With Meals.  Dispense: 360 capsule; Refill: 3    6. Vitamin D deficiency  Assessment & Plan:  [place] vit D 41.3 off of supplementation for the summer (takes supplementation fall/winter months)      7. Low vitamin B12 level  Assessment & Plan:  NEW dx with bord B12 231; patient is on met XR 2000mg QD; rec OTC B12 SL 500mcg QD; f/u level in 3 mos    Orders:  -     Vitamin B12; Future  -     vitamin B-12 (CYANOCOBALAMIN) 500 MCG tablet; Take 1 tablet by mouth Daily.    8. Elevated TSH  Assessment & Plan:  TSH back to normal; no meds      9. Left wrist pain  Comments:  x1d; sugg of tendinitis; rec scheduled NSAIDs for the rest of the week with ice/elevation; avoid repetitive movements; f/u if no better        FOLLOW-UP  RTC 3 mos with A1C and B12 (escribed)    Electronically signed by:    Violette Hylton MD, FACP  06/17/2024

## 2024-06-14 NOTE — ASSESSMENT & PLAN NOTE
NEW dx with bord B12 231; patient is on met XR 2000mg QD; rec OTC B12 SL 500mcg QD; f/u level in 3 mos

## 2024-06-17 ENCOUNTER — OFFICE VISIT (OUTPATIENT)
Dept: INTERNAL MEDICINE | Facility: CLINIC | Age: 64
End: 2024-06-17
Payer: COMMERCIAL

## 2024-06-17 VITALS
TEMPERATURE: 97.5 F | BODY MASS INDEX: 29.25 KG/M2 | DIASTOLIC BLOOD PRESSURE: 80 MMHG | SYSTOLIC BLOOD PRESSURE: 136 MMHG | HEIGHT: 68 IN | RESPIRATION RATE: 18 BRPM | HEART RATE: 76 BPM | WEIGHT: 193 LBS | OXYGEN SATURATION: 97 %

## 2024-06-17 DIAGNOSIS — E78.1 HYPERTRIGLYCERIDEMIA: Chronic | ICD-10-CM

## 2024-06-17 DIAGNOSIS — R79.89 LOW VITAMIN B12 LEVEL: ICD-10-CM

## 2024-06-17 DIAGNOSIS — R79.89 ELEVATED TSH: ICD-10-CM

## 2024-06-17 DIAGNOSIS — E55.9 VITAMIN D DEFICIENCY: Chronic | ICD-10-CM

## 2024-06-17 DIAGNOSIS — E78.2 MIXED HYPERLIPIDEMIA: Chronic | ICD-10-CM

## 2024-06-17 DIAGNOSIS — E11.3293 TYPE 2 DIABETES MELLITUS WITH BOTH EYES AFFECTED BY MILD NONPROLIFERATIVE RETINOPATHY WITHOUT MACULAR EDEMA, WITHOUT LONG-TERM CURRENT USE OF INSULIN: Chronic | ICD-10-CM

## 2024-06-17 DIAGNOSIS — Z00.00 PE (PHYSICAL EXAM), ROUTINE: Primary | ICD-10-CM

## 2024-06-17 DIAGNOSIS — I10 ESSENTIAL HYPERTENSION: Chronic | ICD-10-CM

## 2024-06-17 DIAGNOSIS — M25.532 LEFT WRIST PAIN: ICD-10-CM

## 2024-06-17 PROCEDURE — 93000 ELECTROCARDIOGRAM COMPLETE: CPT | Performed by: INTERNAL MEDICINE

## 2024-06-17 PROCEDURE — 99396 PREV VISIT EST AGE 40-64: CPT | Performed by: INTERNAL MEDICINE

## 2024-06-17 RX ORDER — SEMAGLUTIDE 1.34 MG/ML
1 INJECTION, SOLUTION SUBCUTANEOUS WEEKLY
Qty: 9 ML | Refills: 3 | Status: SHIPPED | OUTPATIENT
Start: 2024-06-17

## 2024-06-17 RX ORDER — ICOSAPENT ETHYL 1 G/1
2 CAPSULE ORAL 2 TIMES DAILY WITH MEALS
Qty: 360 CAPSULE | Refills: 3 | Status: SHIPPED | OUTPATIENT
Start: 2024-06-17

## 2024-06-17 RX ORDER — ROSUVASTATIN CALCIUM 20 MG/1
20 TABLET, COATED ORAL DAILY
Qty: 90 TABLET | Refills: 3 | Status: SHIPPED | OUTPATIENT
Start: 2024-06-17

## 2024-06-17 RX ORDER — UREA 10 %
500 LOTION (ML) TOPICAL DAILY
Start: 2024-06-17

## 2024-06-17 RX ORDER — METFORMIN HYDROCHLORIDE 500 MG/1
2000 TABLET, EXTENDED RELEASE ORAL
Qty: 360 TABLET | Refills: 3 | Status: SHIPPED | OUTPATIENT
Start: 2024-06-17

## 2024-06-17 RX ORDER — FENOFIBRIC ACID 135 MG/1
135 CAPSULE, DELAYED RELEASE ORAL DAILY
Qty: 90 CAPSULE | Refills: 3 | Status: SHIPPED | OUTPATIENT
Start: 2024-06-17

## 2024-06-17 RX ORDER — PIOGLITAZONEHYDROCHLORIDE 30 MG/1
30 TABLET ORAL DAILY
Qty: 90 TABLET | Refills: 3 | Status: SHIPPED | OUTPATIENT
Start: 2024-06-17

## 2024-06-21 DIAGNOSIS — E11.3293 TYPE 2 DIABETES MELLITUS WITH BOTH EYES AFFECTED BY MILD NONPROLIFERATIVE RETINOPATHY WITHOUT MACULAR EDEMA, WITHOUT LONG-TERM CURRENT USE OF INSULIN: Chronic | ICD-10-CM

## 2024-06-21 RX ORDER — PIOGLITAZONEHYDROCHLORIDE 15 MG/1
15 TABLET ORAL DAILY
Qty: 90 TABLET | Refills: 0 | OUTPATIENT
Start: 2024-06-21

## 2024-08-08 ENCOUNTER — OFFICE VISIT (OUTPATIENT)
Dept: INTERNAL MEDICINE | Facility: CLINIC | Age: 64
End: 2024-08-08
Payer: COMMERCIAL

## 2024-08-08 VITALS
HEART RATE: 80 BPM | TEMPERATURE: 97 F | SYSTOLIC BLOOD PRESSURE: 142 MMHG | HEIGHT: 68 IN | OXYGEN SATURATION: 98 % | RESPIRATION RATE: 18 BRPM | DIASTOLIC BLOOD PRESSURE: 84 MMHG | BODY MASS INDEX: 29.25 KG/M2 | WEIGHT: 193 LBS

## 2024-08-08 DIAGNOSIS — I10 ESSENTIAL HYPERTENSION: ICD-10-CM

## 2024-08-08 DIAGNOSIS — M25.511 ACUTE PAIN OF RIGHT SHOULDER: Primary | ICD-10-CM

## 2024-08-08 PROCEDURE — 99213 OFFICE O/P EST LOW 20 MIN: CPT

## 2024-08-08 NOTE — PROGRESS NOTES
Follow Up Office Visit      Date: 2024  Patient Name: Bradley Ortiz  : 1960   MRN: [id number]     Chief Complaint:    Chief Complaint   Patient presents with    Shoulder Pain     Right       History of Present Illness: Bradley Ortiz is a 63 y.o. male who is here today for evaluation of intermittent right shoulder pain that onset over 1-month ago after he was reaching up and felt something tear. He does not experience pain daily. He has tried resting and taking ibuprofen but pain has persisted prompting his visit in today. He is concerned as his [employer] league starts back up next month.     Subjective      Review of Systems:   Review of Systems   Constitutional: Negative.    Musculoskeletal:  Positive for arthralgias. Negative for joint swelling.   Neurological:  Negative for weakness and numbness.       Medications:     Current Outpatient Medications:     aspirin 81 MG tablet, Take 1 tablet by mouth Daily., Disp: , Rfl:     Cholecalciferol (VITAMIN D3) 5000 UNITS capsule capsule, Take 1 capsule by mouth Daily., Disp: , Rfl:     empagliflozin (Jardiance) 25 MG tablet tablet, Take 1 tablet by mouth Daily., Disp: 90 tablet, Rfl: 3    EPINEPHrine (EPIPEN) 0.3 MG/0.3ML solution auto-injector injection, USE AS DIRECTED, Disp: 1 each, Rfl: 1    fenofibric acid (TRILIPIX) 135 MG capsule delayed-release delayed release capsule, Take 1 capsule by mouth Daily., Disp: 90 capsule, Rfl: 3    glucose blood (OneTouch Verio) test strip, Use as instructed, Disp: 100 each, Rfl: 3    icosapent ethyl (Vascepa) 1 g capsule capsule, Take 2 g by mouth 2 (Two) Times a Day With Meals., Disp: 360 capsule, Rfl: 3    metFORMIN ER (GLUCOPHAGE-XR) 500 MG 24 hr tablet, Take 4 tablets by mouth Daily With Breakfast., Disp: 360 tablet, Rfl: 3    Multiple Vitamins-Minerals (MULTIVITAMIN ADULT PO), Take  by mouth Daily., Disp: , Rfl:     OneTouch Delica Lancets 33G misc, 1 each 3 (Three) Times a Day., Disp: 100 each, Rfl: 3     "pioglitazone (ACTOS) 30 MG tablet, Take 1 tablet by mouth Daily., Disp: 90 tablet, Rfl: 3    rosuvastatin (CRESTOR) 20 MG tablet, Take 1 tablet by mouth Daily., Disp: 90 tablet, Rfl: 3    Semaglutide, 1 MG/DOSE, (Ozempic, 1 MG/DOSE,) 4 MG/3ML solution pen-injector, Inject 1 mg under the skin into the appropriate area as directed 1 (One) Time Per Week., Disp: 9 mL, Rfl: 3    vitamin B-12 (CYANOCOBALAMIN) 500 MCG tablet, Take 1 tablet by mouth Daily., Disp: , Rfl:     Allergies:   Allergies   Allergen Reactions    Bee Venom Anaphylaxis     Passed out, tingling in throat, right hand swelling (ER 8/4/19)    Triamterene Other (See Comments)     Hypercalcemia       Objective     Physical Exam:  Vital Signs:   Vitals:    08/08/24 1409   BP: 142/84   Pulse: 80   Resp: 18   Temp: 97 °F (36.1 °C)   TempSrc: Temporal   SpO2: 98%   Weight: 87.5 kg (193 lb)   Height: 172.7 cm (67.99\")   PainSc:   8   PainLoc: Shoulder     Body mass index is 29.35 kg/m².   Facility age limit for growth %kai is 20 years.        Physical Exam  Vitals and nursing note reviewed.   Constitutional:       General: He is not in acute distress.     Appearance: Normal appearance.   Eyes:      Extraocular Movements: Extraocular movements intact.      Conjunctiva/sclera: Conjunctivae normal.   Pulmonary:      Effort: Pulmonary effort is normal. No respiratory distress.   Musculoskeletal:      Left shoulder: No swelling, deformity, tenderness, bony tenderness or crepitus. Normal range of motion. Normal strength. Normal pulse.      Comments: (+) lift off test   Neurological:      General: No focal deficit present.      Mental Status: He is alert and oriented to person, place, and time. Mental status is at baseline.   Psychiatric:         Mood and Affect: Mood normal.         Behavior: Behavior normal.             Assessment / Plan      Assessment/Plan:   Diagnoses and all orders for this visit:    1. Acute pain of right shoulder (Primary)  Assessment & " Plan:  - x 1 month, patient agreeable to PT, continue NSAIDs as needed for pain    Orders:  -     Ambulatory Referral to Physical Therapy for Evaluation & Treatment    2. Hypertension  Assessment & Plan:  - Suboptimal control in office today; cont. home BP checks and bring log of readings to next scheduled appointment with Dr. Hylton as scheduled         Follow Up:   Return for Next scheduled follow up, Follow Up Dr. Hylton.      Marsha Benoit PA-C   [facility]

## 2024-08-09 PROBLEM — M25.511 ACUTE PAIN OF RIGHT SHOULDER: Status: ACTIVE | Noted: 2024-08-09

## 2024-08-09 NOTE — ASSESSMENT & PLAN NOTE
- Suboptimal control in office today; cont. home BP checks and bring log of readings to next scheduled appointment with Dr. Hylton as scheduled

## 2024-09-13 ENCOUNTER — LAB (OUTPATIENT)
Dept: LAB | Facility: HOSPITAL | Age: 64
End: 2024-09-13
Payer: COMMERCIAL

## 2024-09-13 DIAGNOSIS — E11.3293 TYPE 2 DIABETES MELLITUS WITH BOTH EYES AFFECTED BY MILD NONPROLIFERATIVE RETINOPATHY WITHOUT MACULAR EDEMA, WITHOUT LONG-TERM CURRENT USE OF INSULIN: Chronic | ICD-10-CM

## 2024-09-13 DIAGNOSIS — R79.89 LOW VITAMIN B12 LEVEL: ICD-10-CM

## 2024-09-13 LAB
HBA1C MFR BLD: 6.2 % (ref 4.8–5.6)
VIT B12 BLD-MCNC: 529 PG/ML (ref 211–946)

## 2024-09-13 PROCEDURE — 83036 HEMOGLOBIN GLYCOSYLATED A1C: CPT

## 2024-09-13 PROCEDURE — 82607 VITAMIN B-12: CPT

## 2024-09-17 ENCOUNTER — OFFICE VISIT (OUTPATIENT)
Dept: INTERNAL MEDICINE | Facility: CLINIC | Age: 64
End: 2024-09-17
Payer: COMMERCIAL

## 2024-09-17 VITALS
DIASTOLIC BLOOD PRESSURE: 82 MMHG | HEART RATE: 67 BPM | OXYGEN SATURATION: 97 % | BODY MASS INDEX: 29.16 KG/M2 | SYSTOLIC BLOOD PRESSURE: 140 MMHG | WEIGHT: 192.4 LBS | HEIGHT: 68 IN

## 2024-09-17 DIAGNOSIS — M25.511 ACUTE PAIN OF RIGHT SHOULDER: ICD-10-CM

## 2024-09-17 DIAGNOSIS — R79.89 LOW VITAMIN B12 LEVEL: ICD-10-CM

## 2024-09-17 DIAGNOSIS — E11.3293 TYPE 2 DIABETES MELLITUS WITH BOTH EYES AFFECTED BY MILD NONPROLIFERATIVE RETINOPATHY WITHOUT MACULAR EDEMA, WITHOUT LONG-TERM CURRENT USE OF INSULIN: Primary | Chronic | ICD-10-CM

## 2024-09-17 DIAGNOSIS — I10 ESSENTIAL HYPERTENSION: Chronic | ICD-10-CM

## 2024-09-17 DIAGNOSIS — Z71.85 VACCINE COUNSELING: ICD-10-CM

## 2024-09-17 PROCEDURE — 99214 OFFICE O/P EST MOD 30 MIN: CPT | Performed by: INTERNAL MEDICINE

## 2024-09-17 RX ORDER — VALSARTAN 160 MG/1
160 TABLET ORAL DAILY
Qty: 30 TABLET | Refills: 2 | Status: SHIPPED | OUTPATIENT
Start: 2024-09-17

## 2024-09-26 ENCOUNTER — OFFICE VISIT (OUTPATIENT)
Dept: INTERNAL MEDICINE | Facility: CLINIC | Age: 64
End: 2024-09-26
Payer: COMMERCIAL

## 2024-09-26 VITALS
TEMPERATURE: 96.9 F | SYSTOLIC BLOOD PRESSURE: 136 MMHG | WEIGHT: 193.4 LBS | BODY MASS INDEX: 29.31 KG/M2 | DIASTOLIC BLOOD PRESSURE: 82 MMHG | OXYGEN SATURATION: 98 % | HEIGHT: 68 IN | HEART RATE: 63 BPM

## 2024-09-26 DIAGNOSIS — E11.3293 TYPE 2 DIABETES MELLITUS WITH BOTH EYES AFFECTED BY MILD NONPROLIFERATIVE RETINOPATHY WITHOUT MACULAR EDEMA, WITHOUT LONG-TERM CURRENT USE OF INSULIN: Chronic | ICD-10-CM

## 2024-09-26 DIAGNOSIS — S89.91XA RIGHT LEG INJURY, INITIAL ENCOUNTER: Primary | ICD-10-CM

## 2024-09-26 DIAGNOSIS — L03.115 CELLULITIS OF RIGHT LEG: ICD-10-CM

## 2024-09-26 RX ORDER — CEPHALEXIN 500 MG/1
500 CAPSULE ORAL 4 TIMES DAILY
Qty: 28 CAPSULE | Refills: 0 | Status: SHIPPED | OUTPATIENT
Start: 2024-09-26 | End: 2024-10-03

## 2024-10-07 ENCOUNTER — HOSPITAL ENCOUNTER (OUTPATIENT)
Dept: MRI IMAGING | Facility: HOSPITAL | Age: 64
Discharge: HOME | End: 2024-10-07
Admitting: INTERNAL MEDICINE
Payer: COMMERCIAL

## 2024-10-07 DIAGNOSIS — M25.511 ACUTE PAIN OF RIGHT SHOULDER: ICD-10-CM

## 2024-10-07 PROCEDURE — 73221 MRI JOINT UPR EXTREM W/O DYE: CPT

## 2024-10-08 DIAGNOSIS — M67.911 TENDINOPATHY OF RIGHT ROTATOR CUFF: Primary | ICD-10-CM

## 2024-10-08 DIAGNOSIS — M19.011 PRIMARY OSTEOARTHRITIS OF RIGHT SHOULDER: ICD-10-CM

## 2024-10-09 ENCOUNTER — TELEPHONE (OUTPATIENT)
Dept: INTERNAL MEDICINE | Facility: CLINIC | Age: 64
End: 2024-10-09
Payer: COMMERCIAL

## 2024-10-09 NOTE — TELEPHONE ENCOUNTER
----- Message from Violette Hylton sent at 10/8/2024 11:24 PM EDT -----  MRI shoulder shows rotator cuff tendinitis and mild-mod arthritis changes in the R shoulder.  Since he did not improve with PT, rec proceed with ortho consultation for further recs - will make referral

## 2024-10-13 PROBLEM — M19.011 PRIMARY OSTEOARTHRITIS OF RIGHT SHOULDER: Chronic | Status: ACTIVE | Noted: 2024-10-08

## 2024-10-13 NOTE — PROGRESS NOTES
"Chief Complaint   Patient presents with    Hypertension       History of Present Illness  64 y.o.  male presents for f/u on BP, R shoulder pain, and leg injury.  Reports home BPs in the 120s/70-80s.  Not checking regularly.    Wound to the right shin has significantly improved, now only with minimal scabbing.  Reports improvement just after the first dose of Keflex.    Review of Systems  Denies CP, SOB, palpitations.  ROS (+) for R shoulder pain, stable. All other ROS reviewed and negative.    Current Outpatient Medications:     aspirin 81 MG QD    Cholecalciferol (VITAMIN D3) 5000 UNITS QD    empagliflozin (Jardiance) 25 MG QD    EPINEPHrine (EPIPEN) 0.3 MG/0.3ML prn    fenofibric acid 135 MG QD    icosapent ethyl (Vascepa) 1 g 2 BID    metFORMIN  MG 4 QD    Multiple Vitamins-Minerals QD    pioglitazone 30 MG  QD    rosuvastatin 20 MG QD    Semaglutide 1 MG/DOSE, (Ozempic) weekly    valsartan 160 MG QD    vitamin B-12 (CYANOCOBALAMIN) 500 MCG QD    VITALS:  /62   Pulse 70   Resp 22   Ht 172.7 cm (68\")   Wt 87.4 kg (192 lb 9.6 oz)   SpO2 98%   BMI 29.28 kg/m²     Physical Exam  Vitals and nursing note reviewed.   Constitutional:       General: He is not in acute distress.     Appearance: Normal appearance. He is not ill-appearing.   Eyes:      Extraocular Movements: Extraocular movements intact.      Conjunctiva/sclera: Conjunctivae normal.   Pulmonary:      Effort: Pulmonary effort is normal. No respiratory distress.   Skin:     Findings: Lesion (Monitor total wound almost the entire length of the right lower shin is well-healed with only minimal scabbing, no erythema or drainage) present.   Neurological:      Mental Status: He is alert and oriented to person, place, and time. Mental status is at baseline.      Gait: Gait normal.   Psychiatric:         Mood and Affect: Mood normal.         Behavior: Behavior normal.         LABS  Results for orders placed or performed in visit on 09/13/24 "   Vitamin B12    Collection Time: 09/13/24 10:27 AM    Specimen: Blood   Result Value Ref Range    Vitamin B-12 529 211 - 946 pg/mL   Hemoglobin A1c    Collection Time: 09/13/24 10:27 AM    Specimen: Blood   Result Value Ref Range    Hemoglobin A1C 6.20 (H) 4.80 - 5.60 %     10/7/24 MRI R shoulder - capsulitis, rotator cuff tendinopathy, mod AC/GH degen changes, min biceps tenosynovitis    ASSESSMENT/PLAN    Diagnoses and all orders for this visit:    1. Hypertension (Primary)  Assessment & Plan:  BP mildly elevated, improved/normal on repeat; cont valsartan 160mg QD #90, 2RF; BMP on the way out the door for drug monitoring    Orders:  -     Basic Metabolic Panel; Future  -     valsartan (DIOVAN) 160 MG tablet; Take 1 tablet by mouth Daily.  Dispense: 90 tablet; Refill: 2    2. Tendinopathy of right rotator cuff  Assessment & Plan:  See MRI results, showing capsulitis and rotator cuff tendinopathy; failed PT; has ortho appt with Dr. Zendejas pending 10/24/24      3. Adhesive capsulitis of right shoulder  Comments:  MRI - capsulitis and rotator cuff tendinopathy; failed PT; has ortho appt with Dr. Zendejas pending 10/24/24; may benefit from steroid injection    4. Injury of right lower extremity, subsequent encounter  Comments:  s/p 7d course of cephalexin; nearly completely healed at this time; discussed not picking off the scabs    5. Needs flu shot  -     Fluzone >6mos ([id number])        FOLLOW-UP  Health maintenance - rec flu vacc (given today); declines any further COVID19 vacc; RSV vacc prev completed  RTC 12/17/24 as scheduled with A1C    Electronically signed by:    Violette Hylton MD, FACP  10/17/2024    EMR Dragon/Transcription Utilization  Please note that portions of this note were completed with a voice recognition program.

## 2024-10-13 NOTE — ASSESSMENT & PLAN NOTE
BP mildly elevated, improved/normal on repeat; cont valsartan 160mg QD #90, 2RF; BMP on the way out the door for drug monitoring

## 2024-10-13 NOTE — ASSESSMENT & PLAN NOTE
See MRI results, showing capsulitis and rotator cuff tendinopathy; failed PT; has ortho appt with Dr. Zendejas pending 10/24/24

## 2024-10-17 ENCOUNTER — LAB (OUTPATIENT)
Dept: LAB | Facility: HOSPITAL | Age: 64
End: 2024-10-17
Payer: COMMERCIAL

## 2024-10-17 ENCOUNTER — OFFICE VISIT (OUTPATIENT)
Dept: INTERNAL MEDICINE | Facility: CLINIC | Age: 64
End: 2024-10-17
Payer: COMMERCIAL

## 2024-10-17 VITALS
WEIGHT: 192.6 LBS | RESPIRATION RATE: 22 BRPM | SYSTOLIC BLOOD PRESSURE: 122 MMHG | BODY MASS INDEX: 29.19 KG/M2 | DIASTOLIC BLOOD PRESSURE: 62 MMHG | HEIGHT: 68 IN | HEART RATE: 70 BPM | OXYGEN SATURATION: 98 %

## 2024-10-17 DIAGNOSIS — M67.911 TENDINOPATHY OF RIGHT ROTATOR CUFF: ICD-10-CM

## 2024-10-17 DIAGNOSIS — I10 ESSENTIAL HYPERTENSION: Chronic | ICD-10-CM

## 2024-10-17 DIAGNOSIS — M75.01 ADHESIVE CAPSULITIS OF RIGHT SHOULDER: ICD-10-CM

## 2024-10-17 DIAGNOSIS — S89.91XD INJURY OF RIGHT LOWER EXTREMITY, SUBSEQUENT ENCOUNTER: ICD-10-CM

## 2024-10-17 DIAGNOSIS — I10 ESSENTIAL HYPERTENSION: Primary | Chronic | ICD-10-CM

## 2024-10-17 DIAGNOSIS — Z23 NEEDS FLU SHOT: ICD-10-CM

## 2024-10-17 LAB
ANION GAP SERPL CALCULATED.3IONS-SCNC: 12 MMOL/L (ref 5–15)
BUN SERPL-MCNC: 20 MG/DL (ref 8–23)
BUN/CREAT SERPL: 16.8 (ref 7–25)
CALCIUM SPEC-SCNC: 10.2 MG/DL (ref 8.6–10.5)
CHLORIDE SERPL-SCNC: 106 MMOL/L (ref 98–107)
CO2 SERPL-SCNC: 24 MMOL/L (ref 22–29)
CREAT SERPL-MCNC: 1.19 MG/DL (ref 0.76–1.27)
EGFRCR SERPLBLD CKD-EPI 2021: 68.2 ML/MIN/1.73
GLUCOSE SERPL-MCNC: 120 MG/DL (ref 65–99)
POTASSIUM SERPL-SCNC: 4.4 MMOL/L (ref 3.5–5.2)
SODIUM SERPL-SCNC: 142 MMOL/L (ref 136–145)

## 2024-10-17 PROCEDURE — 90471 IMMUNIZATION ADMIN: CPT | Performed by: INTERNAL MEDICINE

## 2024-10-17 PROCEDURE — 90656 IIV3 VACC NO PRSV 0.5 ML IM: CPT | Performed by: INTERNAL MEDICINE

## 2024-10-17 PROCEDURE — 99214 OFFICE O/P EST MOD 30 MIN: CPT | Performed by: INTERNAL MEDICINE

## 2024-10-17 PROCEDURE — 80048 BASIC METABOLIC PNL TOTAL CA: CPT

## 2024-10-17 RX ORDER — VALSARTAN 160 MG/1
160 TABLET ORAL DAILY
Qty: 90 TABLET | Refills: 2 | Status: SHIPPED | OUTPATIENT
Start: 2024-10-17

## 2024-10-18 ENCOUNTER — TELEPHONE (OUTPATIENT)
Dept: INTERNAL MEDICINE | Facility: CLINIC | Age: 64
End: 2024-10-18
Payer: COMMERCIAL

## 2024-10-18 NOTE — TELEPHONE ENCOUNTER
----- Message from Violette Hylton sent at 10/17/2024  7:29 PM EDT -----  Kidney function and electrolytes, including potassium, are all normal.  Continue current medications and BP monitoring as discussed

## 2024-10-24 ENCOUNTER — OFFICE VISIT (OUTPATIENT)
Dept: ORTHOPEDIC SURGERY | Facility: CLINIC | Age: 64
End: 2024-10-24
Payer: COMMERCIAL

## 2024-10-24 VITALS
BODY MASS INDEX: 29.55 KG/M2 | DIASTOLIC BLOOD PRESSURE: 72 MMHG | WEIGHT: 195 LBS | SYSTOLIC BLOOD PRESSURE: 122 MMHG | HEIGHT: 68 IN

## 2024-10-24 DIAGNOSIS — M75.41 IMPINGEMENT SYNDROME OF RIGHT SHOULDER: ICD-10-CM

## 2024-10-24 DIAGNOSIS — M75.00 DIABETIC FROZEN SHOULDER ASSOCIATED WITH TYPE 2 DIABETES MELLITUS: Primary | ICD-10-CM

## 2024-10-24 DIAGNOSIS — M75.51 BURSITIS OF RIGHT SHOULDER: ICD-10-CM

## 2024-10-24 DIAGNOSIS — E11.618 DIABETIC FROZEN SHOULDER ASSOCIATED WITH TYPE 2 DIABETES MELLITUS: Primary | ICD-10-CM

## 2024-10-24 DIAGNOSIS — M75.01 ADHESIVE CAPSULITIS OF RIGHT SHOULDER: ICD-10-CM

## 2024-10-24 RX ORDER — LIDOCAINE HYDROCHLORIDE 10 MG/ML
5 INJECTION, SOLUTION EPIDURAL; INFILTRATION; INTRACAUDAL; PERINEURAL
Status: COMPLETED | OUTPATIENT
Start: 2024-10-24 | End: 2024-10-24

## 2024-10-24 RX ORDER — TRIAMCINOLONE ACETONIDE 40 MG/ML
40 INJECTION, SUSPENSION INTRA-ARTICULAR; INTRAMUSCULAR
Status: COMPLETED | OUTPATIENT
Start: 2024-10-24 | End: 2024-10-24

## 2024-10-24 RX ADMIN — LIDOCAINE HYDROCHLORIDE 5 ML: 10 INJECTION, SOLUTION EPIDURAL; INFILTRATION; INTRACAUDAL; PERINEURAL at 09:42

## 2024-10-24 RX ADMIN — TRIAMCINOLONE ACETONIDE 40 MG: 40 INJECTION, SUSPENSION INTRA-ARTICULAR; INTRAMUSCULAR at 09:42

## 2024-10-24 NOTE — PROGRESS NOTES
Procedure   - Large Joint Arthrocentesis: R glenohumeral on 10/24/2024 9:42 AM  Indications: pain  Details: 21 G needle, ultrasound-guided posterior approach  Medications: 5 mL lidocaine PF 1% 1 %; 40 mg triamcinolone acetonide 40 MG/ML  Outcome: tolerated well, no immediate complications  Procedure, treatment alternatives, risks and benefits explained, specific risks discussed. Consent was given by the patient. Immediately prior to procedure a time out was called to verify the correct patient, procedure, equipment, support staff and site/side marked as required. Patient was prepped and draped in the usual sterile fashion.

## 2024-10-24 NOTE — PROGRESS NOTES
[facility] Orthopaedic Surgery Office Visit - Javier Zendejas MD  [facility]    Office Visit       Patient Name: Bradley Ortiz    Chief Complaint:   Chief Complaint   Patient presents with    Right Shoulder - Pain, Initial Evaluation       Referring Physician: Violette Hylton MD  - I appreciate the referral      History of Present Illness:   Bradley Ortiz is a 64 y.o. male who presents with right body part: shoulder Reason: pain.  Onset:Onset: atraumatic and gradual in nature. The issue has been ongoing for 2 month(s). Pain is a 7/10 on the pain scale. Pain is described as Pain Characterization: stabbing. Associated symptoms include Symptoms: pain. The pain is worse with any movement of the joint and reaching up high or behind his back ; resting improve the pain. Previous treatments have included: physical therapy. I have reviewed the patient's history of present illness as noted/entered above.    I have reviewed the patient's past medical history, surgical history, social history, family history, medications, and allergies as noted in the electronic medical record and as noted/entered.  I have reviewed the patient's review of systems as noted/enter and updated as noted in the patient's HPI.      RIGHT SHOULDER pain  Worse x2 months  MRI RIGHT SHOULDER completed, reviewed  Stabbing pain  Completed PT  Pain persists  Type 2 diabetes mellitus  Retired, originally from [employer]  Diabetic Frozen Shoulder   Supportive wife present.  He enjoys staying busy and babysitting grandchild        64 y.o. male  Body mass index is 29.65 kg/m².    Subjective   Subjective      Review of Systems   Constitutional: Negative.  Negative for chills, fatigue and fever.   HENT: Negative.  Negative for congestion and dental problem.    Eyes: Negative.  Negative for blurred vision.   Respiratory: Negative.  Negative for  shortness of breath.    Cardiovascular: Negative.  Negative for leg swelling.   Gastrointestinal: Negative.  Negative for abdominal pain.   Endocrine: Negative.  Negative for polyuria.   Genitourinary: Negative.  Negative for difficulty urinating.   Musculoskeletal:  Positive for arthralgias.   Skin: Negative.    Allergic/Immunologic: Negative.    Neurological: Negative.    Hematological: Negative.  Negative for adenopathy.   Psychiatric/Behavioral: Negative.  Negative for behavioral problems.         Past Medical History:   Past Medical History:   Diagnosis Date    Bee sting 06/03/2016    Impression: 08/30/2014 - precautions discussed; has updated Epi Pen  Impression: 06/30/2014 - borderline allergic reaction but could be as extensive as it is due to the numerous stings he sustained; DXM 1cc IM x1 in the office; RXs for prednisone taper #36, 0RF and epi pen #1, 0RF; also rec OTC antihsitamine QD and zantac/pepcid BID, aveeno oatmeal baths, and avoidance of heat and hot showers;     CTS (carpal tunnel syndrome)     right wrist    Hx of anaphylaxis 08/04/2019    bee sting (8/4/19), went to ER, has Epi pen    Hx of colonoscopy 06/15/2015    nl colonosc (6/15/15), repeat 5 yrs; [facility]    Hx of colonoscopy 09/21/2020    colonosc (9/21/20): diverticulosis, enlarged prostate, repeat 5 yrs; [facility]    Hx of MRI R shoulder 10/07/2024    MRI R shoulder (10/7/24): rotator cuff tendinopathy (supraspinatus and subscapularis), mod AC and mild GH degen changes; edema/thickening of axillary pouch sugg of capsulitis; minimal biceps tenosynovitis    Rotator cuff syndrome August, 2024       Past Surgical History:   Past Surgical History:   Procedure Laterality Date    CARPAL TUNNEL RELEASE Right 10/2017    ortho - Dr. Bertrand    COLONOSCOPY      TONSILLECTOMY  1970    age 10    WRIST SURGERY      Right wrist, Capal tunnel       Family History:   Family History   Problem Relation Age of Onset    Cancer Father          Bladder cancer    Diverticulitis Father     Hypertension Father     Colon polyps Father     Coronary artery disease Father         CABG    Hyperlipidemia Father         hyperTG    Diabetes Maternal Uncle     Diabetes Maternal Grandmother     Diabetes Maternal Uncle        Social History:   Social History     Socioeconomic History    Marital status:     Number of children: 2   Tobacco Use    Smoking status: Never     Passive exposure: Never    Smokeless tobacco: Never   Vaping Use    Vaping status: Never Used   Substance and Sexual Activity    Alcohol use: No    Drug use: No    Sexual activity: Yes     Partners: Female       Medications:   Current Outpatient Medications:     aspirin 81 MG tablet, Take 1 tablet by mouth Daily., Disp: , Rfl:     Cholecalciferol (VITAMIN D3) 5000 UNITS capsule capsule, Take 1 capsule by mouth Daily., Disp: , Rfl:     empagliflozin (Jardiance) 25 MG tablet tablet, Take 1 tablet by mouth Daily., Disp: 90 tablet, Rfl: 3    EPINEPHrine (EPIPEN) 0.3 MG/0.3ML solution auto-injector injection, USE AS DIRECTED, Disp: 1 each, Rfl: 1    fenofibric acid (TRILIPIX) 135 MG capsule delayed-release delayed release capsule, Take 1 capsule by mouth Daily., Disp: 90 capsule, Rfl: 3    glucose blood (OneTouch Verio) test strip, Use as instructed, Disp: 100 each, Rfl: 3    icosapent ethyl (Vascepa) 1 g capsule capsule, Take 2 g by mouth 2 (Two) Times a Day With Meals., Disp: 360 capsule, Rfl: 3    metFORMIN ER (GLUCOPHAGE-XR) 500 MG 24 hr tablet, Take 4 tablets by mouth Daily With Breakfast., Disp: 360 tablet, Rfl: 3    Multiple Vitamins-Minerals (MULTIVITAMIN ADULT PO), Take  by mouth Daily., Disp: , Rfl:     OneTouch Delica Lancets 33G misc, 1 each 3 (Three) Times a Day., Disp: 100 each, Rfl: 3    pioglitazone (ACTOS) 30 MG tablet, Take 1 tablet by mouth Daily., Disp: 90 tablet, Rfl: 3    rosuvastatin (CRESTOR) 20 MG tablet, Take 1 tablet by mouth Daily., Disp: 90 tablet, Rfl: 3    Semaglutide, 1  "MG/DOSE, (Ozempic, 1 MG/DOSE,) 4 MG/3ML solution pen-injector, Inject 1 mg under the skin into the appropriate area as directed 1 (One) Time Per Week., Disp: 9 mL, Rfl: 3    valsartan (DIOVAN) 160 MG tablet, Take 1 tablet by mouth Daily., Disp: 90 tablet, Rfl: 2    vitamin B-12 (CYANOCOBALAMIN) 500 MCG tablet, Take 1 tablet by mouth Daily., Disp: , Rfl:     Allergies:   Allergies   Allergen Reactions    Bee Venom Anaphylaxis     Passed out, tingling in throat, right hand swelling (ER 8/4/19)    Triamterene Other (See Comments)     Hypercalcemia       The following portions of the patient's history were reviewed and updated as appropriate: allergies, current medications, past family history, past medical history, past social history, past surgical history and problem list.        Objective    Objective      Vital Signs:   Vitals:    10/24/24 0911   BP: 122/72   Weight: 88.5 kg (195 lb)   Height: 172.7 cm (68\")       Ortho Exam:  RIGHT SHOULDER    General: no acute distress, comfortable  Vitals reviewed in chart    Musculoskeletal Exam    SIDE: Right shoulder  Shoulder Exam:  Range of motion measurements (degrees)  Forward flexion/Abduction/External rotation at side/ER at 90/IR at 90/IR position  Active: 130/130/30/70/40/buttock  Passive: 150/150/40/70/40/buttock    Pain with internal/external rotation and findings consistent with diabetic frozen shoulder.    Diminished internal rotation and external rotation  Painful arc of motion  No evidence of septic joint  Pain with forward flexion and abduction greater than 120  Impingement testing Neer's test - positive/painful  Impingement testing Hawkin's test - positive/painful  Rotator cuff testing Violette's test - mild pain but no obvious weakness, pain limited  Rotator cuff testing External rotation - no weakness  Rotator cuff testing Lag signs - absent  Rotator cuff testing Belly press - negative  Scapular dyskinesis - present, abnormal scapular motion      Results Review: "   Imaging Results (Last 24 Hours)       ** No results found for the last 24 hours. **          MRI Shoulder Right Without Contrast    Result Date: 10/8/2024  Impression: Edema and thickening of the axillary pouch, predominantly anteriorly suggestive of capsulitis. Rotator cuff tendinopathy without high-grade or full-thickness tearing. Moderate acromioclavicular and mild glenohumeral degenerative changes. Minimal biceps tenosynovitis. Electronically Signed: Donald James MD  10/8/2024 4:58 PM EDT  Workstation ID: [id number]      I personally reviewed and personally interpreted the imaging above.  Findings of adhesive capsulitis noted on MRI.  Rotator cuff tendinopathy.  Baseline degenerative changes.    Procedures     RIGHT SHOULDER --ultrasound-guided glenohumeral joint injection per NEEL Holman-see her procedure note        Assessment / Plan      Assessment/Plan:   Problem List Items Addressed This Visit          Musculoskeletal and Injuries    Bursitis of right shoulder    Relevant Orders    Ambulatory Referral to Physical Therapy for Evaluation & Treatment    Impingement syndrome of right shoulder    Relevant Orders    Ambulatory Referral to Physical Therapy for Evaluation & Treatment    Adhesive capsulitis of right shoulder    Relevant Orders    Ambulatory Referral to Physical Therapy for Evaluation & Treatment    Diabetic frozen shoulder associated with type 2 diabetes mellitus - Primary    Relevant Medications    empagliflozin (Jardiance) 25 MG tablet tablet    metFORMIN ER (GLUCOPHAGE-XR) 500 MG 24 hr tablet    pioglitazone (ACTOS) 30 MG tablet    Semaglutide, 1 MG/DOSE, (Ozempic, 1 MG/DOSE,) 4 MG/3ML solution pen-injector    Other Relevant Orders    Ambulatory Referral to Physical Therapy for Evaluation & Treatment     Right SHOULDER - Diabetic Frozen Shoulder    Selective rest/activity modifications recommended while the shoulder recovers, although stretching and physical therapy are  encouraged.    Physical therapy prescription provided and stretching program discussed    Steroid injection - a steroid injection can be beneficial and was offered.  Risks and benefits were discussed including a bump in blood glucose in the case of Diabetes.  Per NEEL Holman    Follow-up - the patient can schedule an appointment for 2 months pending progress with the nonoperative treatment program    Patient handout was provided with discussion of frozen shoulder  What is a frozen shoulder?  Frozen Shoulder or Idiopathic Adhesive Capsulitis - the patient has a diagnosis of idiopathic adhesive capsulitis or “frozen shoulder.”  We discussed that this condition can have variable “freezing” and “thawing” phases that can be very painful.  Fortunately, this condition rarely requires operative intervention and responds to conservative measures gradually over time.  Unfortunately, I did  that the symptoms can last up to 6-12 months in some patients and frozen shoulder can return to the same shoulder or impact the other shoulder in the future.    What is our plan to help nonoperatively treat frozen shoulder?  We discussed a plan for selective rest/activity modification, frozen shoulder exercises demonstrated with emphasis on range of motion and physical therapy prescription given, and the option of a corticosteroid injection.      When will I see the patient back?  I will see the patient back in 2 months to follow-up their progress pending progress or sooner if needed.  If the patient is improved then they can call to cancel the appointment.  If the patient has improved range of motion, but continued pain, then we will look for other potential causes of shoulder pain at the follow-up appointment.  The luong now is to improve the range of motion and gradually decrease the pain they are experiencing.      Follow Up: 2 months, no xrays        Javier Zendejas MD, FAAOS  Orthopedic Surgeon, Shoulder  Surgery  [facility]  Orthopedics and Sports Medicine  [address], [address]  [place] [address]    & New Location:  [facility] Location  [address], [address]  [place] [address]    10/24/24  09:37 EDT

## 2024-11-06 ENCOUNTER — TREATMENT (OUTPATIENT)
Dept: PHYSICAL THERAPY | Facility: CLINIC | Age: 64
End: 2024-11-06
Payer: COMMERCIAL

## 2024-11-06 DIAGNOSIS — M75.01 ADHESIVE CAPSULITIS OF RIGHT SHOULDER: Primary | ICD-10-CM

## 2024-11-06 NOTE — PROGRESS NOTES
Physical Therapy Initial Evaluation and Plan of Care  [place]     Patient: Bradley Ortiz   : 1960  Diagnosis/ICD-10 Code:  No primary diagnosis found.  Referring practitioner: Javier Zendejas MD  Date of Initial Visit: 2024  Today's Date: 2024  Patient seen for Visit count could not be calculated. Make sure you are using a visit which is associated with an episode. session         Visit Diagnoses:  No diagnosis found.    Patient Active Problem List    Diagnosis Date Noted    Bursitis of right shoulder 10/24/2024     Note Last Updated: 2024     10/24/24 Rosemary Zendejas - adhesive capsulitis/bursitis, s/p steroid injection, PT referral, RTC 2 mos;      Impingement syndrome of right shoulder 10/24/2024     Note Last Updated: 2024     10/24/24 Rosemary Zendejas - adhesive capsulitis/bursitis, s/p steroid injection, PT referral, RTC 2 mos;      Adhesive capsulitis of right shoulder 10/24/2024     Note Last Updated: 2024     10/24/24 Rosemary Zendejas - adhesive capsulitis/bursitis, s/p steroid injection, PT referral, RTC 2 mos;      Osteoarthritis of right shoulder 10/08/2024     Note Last Updated: 2024     10/24/24 Rosemary Zendejas - adhesive capsulitis/bursitis, s/p steroid injection, PT referral, RTC 2 mos;MRI R shoulder (10/7/24): rotator cuff tendinopathy (supraspinatus and subscapularis), mod AC and mild GH degen changes; edema/thickening of axillary pouch sugg of capsulitis; minimal biceps tenosynovitis       Tendinopathy of right rotator cuff 2024     Note Last Updated: 2024     10/24/24 Rosemary Zendejas - adhesive capsulitis/bursitis, s/p steroid injection, PT referral, RTC 2 mos; 10/7/24 MRI R shoulder: rotator cuff tendinopathy (supraspinatus and subscapularis), mod AC and mild GH degen changes; edema/thickening of axillary pouch sugg of capsulitis; minimal biceps tenosynovitis       Low vitamin B12 level 2024     Note Last Updated: 2024      9/13/24 nl B12 529; 6/12/24 bord low B12 231      Elevated TSH 06/08/2023     Note Last Updated: 6/12/2024 6/12/24 nl TSH 3.00; 6/7/23 NEW bord TSH 4.28      CKD (chronic kidney disease) stage 2, GFR 60-89 ml/min 06/08/2023     Note Last Updated: 6/8/2023 6/7/23 bord/incr'd Cr 1.28 GFR 63 (was Cr 1.13, GFR 73.9 in 5/22)      Hypertriglyceridemia 11/29/2022     Note Last Updated: 3/8/2023     3/7/23 improved lipids TC 89, , HDL 29, LDL 37; cont rosuvastatin 20mg QD, trilipix 135mg QD, and vascepa 1gm 2 BID; 11/29/22 abnl lipids , , HDL 35, LDL 78; 10/15/21 , TG 1475, LDL 79; goal TG < 150, goal LDL < 70      Ocular hypertension, right eye 06/13/2022     Note Last Updated: 6/14/2022 4/12/22 ophtho/Dr. Ben urbina glaucoma OD with ocular HTN, no meds for now, RTC 6 mos with IOP; 3/30/22 opto/Dr. Richter - referral to glaucoma specialist, rec retinal checks q6 mos      Allergy to bee sting 06/07/2022    Borderline glaucoma (glaucoma suspect), right 04/12/2022     Note Last Updated: 6/20/2023 4/14/23 ophtho/Dr. Ben urbina glaucoma OD, RTC 6 mos; 4/12/22 ophtho/Dr. Ben urbina glaucoma OD with ocular HTN, no meds for now, RTC 6 mos with IOP;      COVID-19 virus infection 10/18/2021     Note Last Updated: 9/9/2022 9/1/22 (+) COVID19; 10/17/21 symptomatic with stuffy nose, 10/18/21 lost smell/taste and (+) COVID19 test      Left-sided tinnitus 06/04/2021    Diverticulosis of colon 09/24/2020     Note Last Updated: 9/24/2020 9/21/20 colonosc/Dr. Brandon - diverticulosis, enlarged prostate, repeat 5 yrs      Nonproliferative retinopathy due to secondary diabetes 06/11/2020     Note Last Updated: 6/11/2020     6/10/2020 opto/Dr. Keith - mild-mod nonproliferative DM retinopathy, RTC 3 mos      Benign prostatic hyperplasia with weak urinary stream 09/08/2019     Note Last Updated: 9/24/2020 9/21/20 colonosc/Dr. Brandon - mildly enlarged prostate, diverticulosis; 9/4/19 uro/  Ray - PSA back down to 1.1, CASSANDRA/PSA per PCP, no current urinary sxs      Increased prostate specific antigen (PSA) velocity 05/11/2019     Note Last Updated: 6/1/2022 5/31/22 stable PSA 1.2; 5/28/21 acceptable PSA 1.73, 5/26/20 PSA back down to 1.3 (was 2.3 in 12/19), 39.2% free PSA (5% risk); 9/4/19 uro/DrRosalie Ray - PSA back down to 1.1, no FH prostate CA, reassurance given, CASSANDRA/PSA per PCP; 7/18/19 uro/ Ray - repeat PSA in 1 month, TRUS and PBX if further elevation; 6/29/19 PSA further elevated 2.3, 42.6% free PSA, rec uro consultation; 5/11/19 PSA 1.43, was 0.78 in 3/18      Carpal tunnel syndrome, bilateral 09/15/2017     Note Last Updated: 1/5/2018     10/17 s/p R. CTS surgery; NCV (8/17) R>L per pt; hand ortho - Dr. Berrtand      PE (physical exam), routine 03/03/2017    Hypercalcemia 03/03/2017     Note Last Updated: 9/7/2023 9/7/23 nl Ca 9.9; 6/7/23 recurrent elevated Ca 10.9; 6/8/17 resolved after discontinuation of HCTZ, Non-PTH mediated;       Hyperlipidemia 06/03/2016     Note Last Updated: 6/12/2024 6/12/24 stable lipids , TG 77, HDL 40, LDL 51; 6/7/23 stable lipids , , HDL 34, LDL; 56 3/7/23 improved lipids TC 89, , HDL 29, LDL 37; 11/29/22 abnl lipids , , HDL 35, LDL 78; incr rosuvastatin 20mg QD; cont trilipix 135mg QD and vascepa 1gm 2 BID; 9/7/22 worsened lipids , , HDL 32, ; RX rosuvastatin 10mg QD; 5/31/22 worsened lipids , , HDL 32, ; goal LDL < 70; 3/25/22 improved/abnl lipids , , HDL 25, LDL 68; on fenofibrate 135mg and vascepa 2gm BID;  10/15/21 abnl lipids , TG 1475, LDL 79; no HDL; 5/28/21 worsened lipids , , HDL 33, ; goal LDL < 70; 5/26/20 stable bord elevated lipids , , HDL 29, LDL 71; on fenofibrate 135mg QD and vascepa 2gm BID; goal LDL < 70; 03/01/2016 - LDL excellent by significantly elevated TGs; remains on fenofibrate and rec consistency with vascepa  2 BID; decr saturated fats/chol in the diet; f/u lipids in 3 mos  Impression: 11/06/2015 - LDL near goal but TGs elevated despite fenofibrate and fish oil supplementation; trial of vascepa 1gm 2 BID #60, 0RF; if covered by insurance, f/u lipids in 3 mos; decr saturated fats/chol in the diet  Impression: 07/28/2015 - lipids unchanged with goal LDL < 100 and TG < 150; remains on fenofibrate; options are to incr OTC fish oil, take RX fish oil, or Niaspan; he wants to incr OTC fish oil, and I have advised ensuring DHA/EPA 56724cu/day; f/u lipids in 3 mos  Impression: 05/29/2015 - goal LDL < 100; f/u lipids in 2 mos  Impression: 04/21/2015 - LDL remains bord with goal < 100; TGs remains elevated despite fenofibrate therapy; advised augmentation of fish oil to obtain 1000mg/day DHA/EPA components; repeat lipids in 3 mos  Impression: 01/09/2015 - LDL slightly worsened and elevated TGs; remains on fenofibrate #90, 3RF and decr saturated fats in the diet; f/u lipids in 3 mos  Impression: 08/30/2014 - lipids remains borderline with goal LDL < 100 and goal TG < 150; remains on fenofibrate for the elevated TGs; consider fish oil supplementation; cont decreased saturated fats in the diet  Impression: 05/12/2014 - LDL at goal but TGs elevated with goal < 150; remains on fenofibrate but encouraged re-eval of DHA/EPA content in fish oil supplementation, with goal 1000mg/day; repeat lipids in 3 mos  Impression: 02/10/2014 - LDL at goal < 100 but TGs elevated with goal < 150; cont fenofibrate and resume fish oil to total 1000mg/day of the DHA/EPA component; repeat lipids in 3 mos; if not improved, may need to add med such as lovaza;         Hypertension 06/03/2016     Note Last Updated: 4/9/2021 4/9/21 /76; goal < 130/80; Impression: 2/21/20 /76; no meds;  5/17/19 stable /74; no meds; 6/8/17 BP stable on no meds; 03/01/2016 - BP and electrolytes stable on triam HCTZ, renew as needed; low Na diet  Impression:  11/06/2015 - BP stable  Impression: 07/28/2015 - BP stable on triam HCTZ  Impression: 05/29/2015 - BP remains stable  Impression: 04/27/2015 - BP stable; discussed which OTC meds to take that would not exacerbate BP  Impression: 01/09/2015 - BP stable on triam HCTZ #90, 3RF, low Na diet  Impression: 08/30/2014 - BP remains stable on triam HCTZ; low Na diet  Impression: 05/12/2014 - BP stable on triam HCTZ; consider addition of ACEI for nephroprotective effects  Impression: 02/10/2014 - repeat BP borderline; cont low Na diet; is on triam HCTZ for BP as well as Meniere's;       Meniere disease 06/03/2016     Note Last Updated: 2/1/2019     Impression: 01/09/2015 - stable with rare use of meclizine and phen - re-escribed because last RX went to wrong [facility]  Impression: 08/30/2014 - stable with rare use of meclizine #30, 1RF and phenergan #15, 0RF; notes need to use phenergan usually once during the year; Description: on chronic HCTZ; LEFT EAR; allergy - Dr. Tavarez      Obesity 06/03/2016     Note Last Updated: 6/11/2023     -with comorbidities of HTN, DM, hyperlipidemia, LBP    Impression: 03/01/2016 - challenged patient to get below 200lbs; regular exercise with adequate intensity and portion size control;       Onychomycosis of toenail 06/03/2016     Note Last Updated: 12/2/2022     Impression: 04/21/2015 - discussed keeping toenail trimmed as he is doing; OK to cont current topical OTC therapy; OK to try teatree oil; no indication with mild nature to pursue oral antifungal;       Seborrheic keratosis 06/03/2016     Note Last Updated: 6/3/2016     Impression: 04/21/2015 - L. temple area seems to be SK; R. cheek area is SK versus AK; he also notes new skin tag at inenr R. eye that has developed over the last month; rec derm consultation - he will schedule on his own (have his wife do it) and call if needs formal referral;       Type 2 diabetes mellitus with both eyes affected by mild nonproliferative retinopathy without  macular edema, without long-term current use of insulin 06/03/2016     Note Last Updated: 9/14/2024 9.13.24 A1C 6.2; 6/12/24 A1C 6.0; 4/9/24 A1C 6.7; cont ozempic 1mg weekly, jardiance 25mg QD and met 2000mg QD, incr mariaelena 30mg QD; 10/25/23 opto/Dr. Acosta - no DMR; 9/14/23 add mariaelena 15mg QD; 9/7/23 unchanged A1C 6.7; 6/7/23 A1C 6.7; 4/14/23 ophtho/Dr. Contreras - no DMR, RTC 6 mos for f/u bord glaucoma OD; 3.14.23 invokana denied by insurance, change to jardiance 25mg QD; 3/7/23 improved A1C 6.6; 11/29/22 worsened A1C 7.7; 9/7/22 A1C 6.5; 5/31/22 improved A1C 6.5; 3/25/22 A1C 7.3 incr ozempic 1mg weekly, cont met ER 2000mg QD and invokana 300mg QD; 10/15/21 A1C 7.4; cont invokana 300mg QD and met ER 2000mg QD, change trulicity to ozempic; 5/28/21 A1C 5.93; 4/9/21 A1C 6.2; on met ER 500mg 4 QD, invokana 300mg QD, trulicity 1.5mg weekly; 1/8/21 A1C worsened 8.5; on invokana 300mg QD, met ER 500mg 4 QD; add Trulicity 0.75mg weekly;  9/4/20 A1C 7.2; 6/10/2020 opto/Dr. Keith - mild-mod nonproliferative DM retinopathy, RTC 3 mos; 5/26/20 A1C worsened 6.93; 2/21/20 A1C 6.6 (was 6.8 in 11/19); on invokana 300mg QD and met ER 500mg 4 QD;  1/11/18 opto/Dr. Keith - no DM retinopathy; 03/01/2016 - BG control worsened; encouraged consistency with exercise and moderation in unhealthy starches/sweets; remains on metformin and invokana; f/u A1C in 3 mos - may need to add anti DM meds if no better  Impression: 11/06/2015 - BG control stable with goal A1C < 6.5; encouraged increased phys activity; cont invokana and metformint; f/u A1C in 3 mos  Impression: 07/28/2015 - BGs continue to improve; cont met ER and invokana; f/u in 3 mos with A1C; cont with healthy diet and phys activity  Impression: 05/29/2015 - proceed to 300mg QD and continue metformin ER 500mg 2 QD; samples provided and RX for #30, 5RF; RTC 2 mos with A1C  Impression: 04/21/2015 - BG control worsened; agree with exercise intervention; cont metformin; change januvia to  invokana 100mg QD - samples provided, reviewed potential side effects and how to take correctly; RTC in 1 month to review BG readings and plan for repeat A1C in 3 mos; call with questions in the interim  Impression: 01/09/2015 - BG control worsened; cont metformin #180, 3RF and januvia #90, 3RF and resume healthy diet and more reg aerobic activity; referral to DM nutrition as requested by patient; f/u A1C in 3 mos  Impression: 08/30/2014 - BG control significantly improved with addition of januvia #30, 5RF; cont metformin with increased phys activity and moderation in unhealthy starches; repeat A1C with PE labs  Impression: 05/12/2014 - BG control much worsened; reviewed med options and common side effects; emphasized importance of resumign reg aerobic activity to decr insulin resistance; in the interim, incr metformin ER to 1000mg 2 tabs QD #180, 1RF and add januvia 100mg QD #30, 3RF; encouraged monitoring some BGs; repeat A1C in 3 mos; Description: no DM retinopathy (11/14); opto - Dr. Keith      Vitamin D deficiency 06/03/2016     Note Last Updated: 6/12/2024 6/12/24 vit D 41.3; 9/7/23 vit D 34.8; no supplement; 6/7/23 vit D 49.1; advised to stop supplement d/t hyperCa;  5/31/22 vit D 61; 5/28/21 vit D 54.0; 5/26/20 stable level 51.4; 03/01/2016 - just started 5000 units QD 2 weeks ago; f/u level in 3 mos  Impression: 07/28/2015 - reviewed nl level in 4/15; cont supplementation and check with next PE labs  Impression: 04/21/2015 - resolved with current supplementation  Impression: 01/09/2015 - vit D level bord; incr vit D to 4000units QD in the winter months, OK to resume 2000units in the spring/summer months; f/u level in 3 mos  Impression: 05/12/2014 - borderline but expect improvement in the next 4 months with increased sun exposure; definitely should maintain daily vit D supplementation in the fall/winter months  Impression: 02/10/2014 - incr vit D to 4000 units QD and add ergo 50,000 units weekly x  8weeks; f/u vit D level in 3 mos;       Actinic keratoses 06/03/2016     Note Last Updated: 6/3/2016     Derm Roberto Tran      Low back pain 06/01/2009     Note Last Updated: 6/7/2022     Formatting of this note might be different from the original.      Neck pain 02/05/2009     Note Last Updated: 6/7/2022     Formatting of this note might be different from the original.      Seasonal allergic rhinitis due to pollen 11/12/2008     Past Medical History:   Diagnosis Date    Bee sting 06/03/2016    Impression: 08/30/2014 - precautions discussed; has updated Epi Pen  Impression: 06/30/2014 - borderline allergic reaction but could be as extensive as it is due to the numerous stings he sustained; DXM 1cc IM x1 in the office; RXs for prednisone taper #36, 0RF and epi pen #1, 0RF; also rec OTC antihsitamine QD and zantac/pepcid BID, aveeno oatmeal baths, and avoidance of heat and hot showers;     CTS (carpal tunnel syndrome)     right wrist    Hx of anaphylaxis 08/04/2019    bee sting (8/4/19), went to ER, has Epi pen    Hx of colonoscopy 06/15/2015    nl colonosc (6/15/15), repeat 5 yrs; ARABELLA Brandon    Hx of colonoscopy 09/21/2020    colonosc (9/21/20): diverticulosis, enlarged prostate, repeat 5 yrs; ARABELLA Brandon    Hx of MRI R shoulder 10/07/2024    MRI R shoulder (10/7/24): rotator cuff tendinopathy (supraspinatus and subscapularis), mod AC and mild GH degen changes; edema/thickening of axillary pouch sugg of capsulitis; minimal biceps tenosynovitis    Rotator cuff syndrome August, 2024     Past Surgical History:   Procedure Laterality Date    CARPAL TUNNEL RELEASE Right 10/2017    ortho - Dr. Bertrand    COLONOSCOPY      TONSILLECTOMY  1970    age 10    WRIST SURGERY      Right wrist, Capal tunnel       Subjective Questionnaire: QuickDASH: 13%    see MRI report below:      MRI SHOULDER RIGHT WO CONTRAST     Date of Exam: 10/7/2024 8:44 AM EDT     Indication: persistent R shoulder pain with decr'd ROM x 2 mos, s/p PT  and conservative care.     Comparison: None available.     Technique:  Routine multiplanar/multisequence images of the right shoulder were obtained without contrast administration.          Findings:  Acromioclavicular joint: Moderate degenerative changes of the acromioclavicular joint. No substantial  subacromial/subdeltoid bursal fluid/edema. Type I acromion. Os acromiale is absent.     Rotator cuff: Tendinopathy of the supraspinatus tendon. No tear or substantial tendinopathy of the infraspinatus tendon. Tendinopathy with low-grade interstitial tearing of the distal inferior fibers of the subscapularis tendon. No tear or substantial   tendinopathy of the teres minor tendon. No fatty atrophy or edematous changes of the the rotator cuff muscles.     Labrum: Degeneration and tearing of the labrum particularly anteriorly and superiorly.     Biceps tendon: Normal position and signal of the long head of the biceps tendon. Minimal tenosynovitis.     Joint: Mild glenohumeral chondral thinning. Articular cartilage is intact. No subchondral edema. Thickening and edema of the anterior aspect of the axillary pouch.     Bones:  No fracture or marrow edema. No suspicious marrow replacing lesions.     Soft tissues: No soft tissue masses or fluid collections seen.     IMPRESSION:  Impression:  Edema and thickening of the axillary pouch, predominantly anteriorly suggestive of capsulitis.     Rotator cuff tendinopathy without high-grade or full-thickness tearing.     Moderate acromioclavicular and mild glenohumeral degenerative changes.     Minimal biceps tenosynovitis.     Electronically Signed: Donald James MD    10/8/2024 4:58 PM EDT    Workstation ID: [id number]  Subjective Evaluation    History of Present Illness  Mechanism of injury: Started in August ; did a round of PT at [facility], about 6 sessions    Got MRI and injection  Have better motion now, pain at upper levels of motion  Most limited with IR HBB     Has not been  doing home exercises lately    Only has pain at extreme ranges of motion    Cautious, guard it     Occasional stiffness in the neck     Hx: DM- last A1C in low 6's; sees Dr. Hylton every 3 months, carpal tunnel surgery right hand       Patient Occupation: retired from [employer]  Pain  Current pain ratin  Quality: discomfort  Aggravating factors: movement, lifting, outstretched reach and overhead activity  Progression: improved    Patient Goals  Patient goals for therapy: independence with ADLs/IADLs, increased strength, increased motion, decreased pain and return to sport/leisure activities           Objective          Postural Observations  Seated posture: fair  Standing posture: fair      Palpation     Additional Palpation Details  No tenderness to palpate     Tenderness     Right Shoulder  No tenderness     Cervical/Thoracic Screen   Cervical range of motion within normal limits with the following exceptions: Occasional crunching in neck     Neurological Testing     Reflexes   Left   Biceps (C5/C6): normal (2+)  Brachioradialis (C6): normal (2+)  Triceps (C7): normal (2+)    Right   Biceps (C5/C6): normal (2+)  Brachioradialis (C6): normal (2+)  Triceps (C7): normal (2+)    Active Range of Motion   Left Shoulder   Internal rotation BTB: T8     Right Shoulder   Flexion: 145 degrees   External rotation 0°: 80 degrees   Internal rotation BTB: L5 with pain    Strength/Myotome Testing     Left Shoulder   Normal muscle strength    Right Shoulder   Normal muscle strength    Tests   Cervical     Right   Negative active compression (Bendena).     Right Shoulder   Negative empty can, lift-off and Bhavin's sign.           Assessment & Plan       Assessment  Impairments: abnormal or restricted ROM, activity intolerance, lacks appropriate home exercise program and pain with function   Functional limitations: carrying objects, lifting, sleeping, pushing, uncomfortable because of pain, reaching behind back and reaching overhead  "  Assessment details: Very pleasant 63 yo presents to PT with complaints of right shoulder pain and immobility; dx with adhesive capsulitis ; mostly limited with IR behind the back; good strength of RTC and clinical exam normal; should respond well to PT to improve functional mobility and strength for ADL's   Prognosis: good    Goals  Plan Goals: 2 weeks:  1. IND with HEP  2. Patient to demonstrate 50% improved mobility of right shoulder  3. Patient to participate in up to 30 minutes of PT without a significant increase in pain    4 weeks:  1. Patient to improve QD score by 1 MCID  2. Patient to display full right shoulder mobility   3. Patient to report ability to perform ADL's without mobility dysfunction    Plan  Therapy options: will be seen for skilled therapy services  Planned modality interventions: iontophoresis, TENS, thermotherapy (hydrocollator packs), ultrasound, high voltage pulsed current (pain management), dry needling and cryotherapy  Planned therapy interventions: manual therapy, neuromuscular re-education, soft tissue mobilization, spinal/joint mobilization, strengthening, stretching, therapeutic activities, joint mobilization, home exercise program, flexibility, functional ROM exercises, balance/weight-bearing training and body mechanics training  Frequency: 1x week  Duration in weeks: 4  Treatment plan discussed with: patient  Plan details: May see patient 1-3 x per week for up to 12 weeks as needed to achieve goals         Access Code: [email or web address]  URL: [email or web address]  Date: 11/06/2024  Prepared by: Moustapha Sorensen    Exercises  - Sleeper Stretch (Mirrored)  - 2 x daily - 7 x weekly - 1 sets - 5 reps - 20\" hold  - Standing Shoulder Internal Rotation Stretch with Towel  - 2 x daily - 7 x weekly - 1 sets - 5 reps - 20\" hold  - Shoulder Scaption AAROM with Dowel  - 3 x daily - 7 x weekly - 3 sets - 10 reps  - Seated Shoulder Inferior Glide (Mirrored)  - 3 x daily - 7 x weekly - 3 sets " "- 10 reps  - Seated Shoulder Inferior Glide in Abduction Below 90  - 2 x daily - 7 x weekly - 1 sets - 10 reps - 5\" hold    Timed:         Manual Therapy:    10     mins  75888;     Therapeutic Exercise:    20     mins  23658;     Neuromuscular Keely:        mins  09759;    Therapeutic Activity:          mins  81713;     Gait Training:           mins  66149;     Ultrasound:          mins  14657;    Ionto                                   mins   58934  Self Care                            mins   37496  Canalith Repos         mins [id number]      Un-Timed:  Electrical Stimulation:         mins  34116 ( );  Dry Needling          mins self-pay  Traction          mins 86728    Low Eval     30     Mins  59305  Mod Eval          Mins  21204  High Eval                            Mins  33270        Timed Treatment:   30   mins   Total Treatment:     60   mins          PT: GAYLA Sorensen PT     License Number: [id number]  Electronically signed by GAYLA Sorensen PT, 11/06/24, 10:18 AM EST    Certification Period: 11/6/2024 thru 2/3/2025  I certify that the therapy services are furnished while this patient is under my care.  The services outlined above are required by this patient, and will be reviewed every 90 days.         Physician Signature:__________________________________________________    PHYSICIAN: Javier Zendejas MD  NPI: [id number]                                      DATE:      Please sign and return via fax to [email or web address] . Thank you, [facility].  "

## 2024-11-06 NOTE — PROGRESS NOTES
Physical Therapy Initial Evaluation and Plan of Care  [facility]    Patient: Bradley Ortiz   : 1960  Diagnosis/ICD-10 Code:  No primary diagnosis found.  Referring practitioner: Javier Zendejas MD  Date of Initial Visit: 2024  Today's Date: 2024  Patient seen for Visit count could not be calculated. Make sure you are using a visit which is associated with an episode. session         Visit Diagnoses:  No diagnosis found.    Patient Active Problem List    Diagnosis Date Noted   • Bursitis of right shoulder 10/24/2024     Note Last Updated: 2024     10/24/24 Rosemary Zendejas - adhesive capsulitis/bursitis, s/p steroid injection, PT referral, RTC 2 mos;     • Impingement syndrome of right shoulder 10/24/2024     Note Last Updated: 2024     10/24/24 Rosemary Zendejas - adhesive capsulitis/bursitis, s/p steroid injection, PT referral, RTC 2 mos;     • Adhesive capsulitis of right shoulder 10/24/2024     Note Last Updated: 2024     10/24/24 Rosemary Zendejas - adhesive capsulitis/bursitis, s/p steroid injection, PT referral, RTC 2 mos;     • Osteoarthritis of right shoulder 10/08/2024     Note Last Updated: 2024     10/24/24 Rosemary Zendejas - adhesive capsulitis/bursitis, s/p steroid injection, PT referral, RTC 2 mos;MRI R shoulder (10/7/24): rotator cuff tendinopathy (supraspinatus and subscapularis), mod AC and mild GH degen changes; edema/thickening of axillary pouch sugg of capsulitis; minimal biceps tenosynovitis      • Tendinopathy of right rotator cuff 2024     Note Last Updated: 2024     10/24/24 Rosemary Zendejas - adhesive capsulitis/bursitis, s/p steroid injection, PT referral, RTC 2 mos; 10/7/24 MRI R shoulder: rotator cuff tendinopathy (supraspinatus and subscapularis), mod AC and mild GH degen changes; edema/thickening of axillary pouch sugg of capsulitis; minimal biceps tenosynovitis      • Low vitamin B12 level 2024     Note Last Updated: 2024      9/13/24 nl B12 529; 6/12/24 bord low B12 231     • Elevated TSH 06/08/2023     Note Last Updated: 6/12/2024 6/12/24 nl TSH 3.00; 6/7/23 NEW bord TSH 4.28     • CKD (chronic kidney disease) stage 2, GFR 60-89 ml/min 06/08/2023     Note Last Updated: 6/8/2023 6/7/23 bord/incr'd Cr 1.28 GFR 63 (was Cr 1.13, GFR 73.9 in 5/22)     • Hypertriglyceridemia 11/29/2022     Note Last Updated: 3/8/2023     3/7/23 improved lipids TC 89, , HDL 29, LDL 37; cont rosuvastatin 20mg QD, trilipix 135mg QD, and vascepa 1gm 2 BID; 11/29/22 abnl lipids , , HDL 35, LDL 78; 10/15/21 , TG 1475, LDL 79; goal TG < 150, goal LDL < 70     • Ocular hypertension, right eye 06/13/2022     Note Last Updated: 6/14/2022 4/12/22 ophtho/Dr. Ben urbina glaucoma OD with ocular HTN, no meds for now, RTC 6 mos with IOP; 3/30/22 opto/Dr. 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 Ben urbina glaucoma OD, RTC 6 mos; 4/12/22 ophtho/Dr. Ben rubina glaucoma OD with ocular HTN, no meds for now, RTC 6 mos with IOP;     • COVID-19 virus infection 10/18/2021     Note Last Updated: 9/9/2022 9/1/22 (+) COVID19; 10/17/21 symptomatic with stuffy nose, 10/18/21 lost smell/taste and (+) COVID19 test     • Left-sided tinnitus 06/04/2021   • Diverticulosis of colon 09/24/2020     Note Last Updated: 9/24/2020 9/21/20 colonosc/Dr. Brandon - diverticulosis, enlarged prostate, repeat 5 yrs     • Nonproliferative retinopathy due to secondary diabetes 06/11/2020     Note Last Updated: 6/11/2020     6/10/2020 opto/Dr. Keith - mild-mod nonproliferative DM retinopathy, RTC 3 mos     • Benign prostatic hyperplasia with weak urinary stream 09/08/2019     Note Last Updated: 9/24/2020 9/21/20 colonosc/Dr. Brandon - mildly enlarged prostate, diverticulosis;  9/4/19 uro/ Ray - PSA back down to 1.1, CASSANDRA/PSA per PCP, no current urinary sxs     • Increased prostate specific antigen (PSA) velocity 05/11/2019     Note Last Updated: 6/1/2022 5/31/22 stable PSA 1.2; 5/28/21 acceptable PSA 1.73, 5/26/20 PSA back down to 1.3 (was 2.3 in 12/19), 39.2% free PSA (5% risk); 9/4/19 uro/ Ray - PSA back down to 1.1, no FH prostate CA, reassurance given, CASSANDRA/PSA per PCP; 7/18/19 uro/ Ray - repeat PSA in 1 month, TRUS and PBX if further elevation; 6/29/19 PSA further elevated 2.3, 42.6% free PSA, rec uro consultation; 5/11/19 PSA 1.43, was 0.78 in 3/18     • Carpal tunnel syndrome, bilateral 09/15/2017     Note Last Updated: 1/5/2018     10/17 s/p R. CTS surgery; NCV (8/17) R>L per pt; hand ortho - Dr. Bertrand     • PE (physical exam), routine 03/03/2017   • Hypercalcemia 03/03/2017     Note Last Updated: 9/7/2023 9/7/23 nl Ca 9.9; 6/7/23 recurrent elevated Ca 10.9; 6/8/17 resolved after discontinuation of HCTZ, Non-PTH mediated;      • Hyperlipidemia 06/03/2016     Note Last Updated: 6/12/2024 6/12/24 stable lipids , TG 77, HDL 40, LDL 51; 6/7/23 stable lipids , , HDL 34, LDL; 56 3/7/23 improved lipids TC 89, , HDL 29, LDL 37; 11/29/22 abnl lipids , , HDL 35, LDL 78; incr rosuvastatin 20mg QD; cont trilipix 135mg QD and vascepa 1gm 2 BID; 9/7/22 worsened lipids , , HDL 32, ; RX rosuvastatin 10mg QD; 5/31/22 worsened lipids , , HDL 32, ; goal LDL < 70; 3/25/22 improved/abnl lipids , , HDL 25, LDL 68; on fenofibrate 135mg and vascepa 2gm BID;  10/15/21 abnl lipids , TG 1475, LDL 79; no HDL; 5/28/21 worsened lipids , , HDL 33, ; goal LDL < 70; 5/26/20 stable bord elevated lipids , , HDL 29, LDL 71; on fenofibrate 135mg QD and vascepa 2gm BID; goal LDL < 70; 03/01/2016 - LDL excellent by significantly elevated TGs; remains on fenofibrate and rec  consistency with vascepa 2 BID; decr saturated fats/chol in the diet; f/u lipids in 3 mos  Impression: 11/06/2015 - LDL near goal but TGs elevated despite fenofibrate and fish oil supplementation; trial of vascepa 1gm 2 BID #60, 0RF; if covered by insurance, f/u lipids in 3 mos; decr saturated fats/chol in the diet  Impression: 07/28/2015 - lipids unchanged with goal LDL < 100 and TG < 150; remains on fenofibrate; options are to incr OTC fish oil, take RX fish oil, or Niaspan; he wants to incr OTC fish oil, and I have advised ensuring DHA/EPA 90384sk/day; f/u lipids in 3 mos  Impression: 05/29/2015 - goal LDL < 100; f/u lipids in 2 mos  Impression: 04/21/2015 - LDL remains bord with goal < 100; TGs remains elevated despite fenofibrate therapy; advised augmentation of fish oil to obtain 1000mg/day DHA/EPA components; repeat lipids in 3 mos  Impression: 01/09/2015 - LDL slightly worsened and elevated TGs; remains on fenofibrate #90, 3RF and decr saturated fats in the diet; f/u lipids in 3 mos  Impression: 08/30/2014 - lipids remains borderline with goal LDL < 100 and goal TG < 150; remains on fenofibrate for the elevated TGs; consider fish oil supplementation; cont decreased saturated fats in the diet  Impression: 05/12/2014 - LDL at goal but TGs elevated with goal < 150; remains on fenofibrate but encouraged re-eval of DHA/EPA content in fish oil supplementation, with goal 1000mg/day; repeat lipids in 3 mos  Impression: 02/10/2014 - LDL at goal < 100 but TGs elevated with goal < 150; cont fenofibrate and resume fish oil to total 1000mg/day of the DHA/EPA component; repeat lipids in 3 mos; if not improved, may need to add med such as lovaza;        • Hypertension 06/03/2016     Note Last Updated: 4/9/2021 4/9/21 /76; goal < 130/80; Impression: 2/21/20 /76; no meds;  5/17/19 stable /74; no meds; 6/8/17 BP stable on no meds; 03/01/2016 - BP and electrolytes stable on triam HCTZ, renew as needed;  low Na diet  Impression: 11/06/2015 - BP stable  Impression: 07/28/2015 - BP stable on triam HCTZ  Impression: 05/29/2015 - BP remains stable  Impression: 04/27/2015 - BP stable; discussed which OTC meds to take that would not exacerbate BP  Impression: 01/09/2015 - BP stable on triam HCTZ #90, 3RF, low Na diet  Impression: 08/30/2014 - BP remains stable on triam HCTZ; low Na diet  Impression: 05/12/2014 - BP stable on triam HCTZ; consider addition of ACEI for nephroprotective effects  Impression: 02/10/2014 - repeat BP borderline; cont low Na diet; is on triam HCTZ for BP as well as Meniere's;      • Meniere disease 06/03/2016     Note Last Updated: 2/1/2019     Impression: 01/09/2015 - stable with rare use of meclizine and phen - re-escribed because last RX went to wrong [facility]  Impression: 08/30/2014 - stable with rare use of meclizine #30, 1RF and phenergan #15, 0RF; notes need to use phenergan usually once during the year; Description: on chronic HCTZ; LEFT EAR; allergy - Dr. Tavarez     • Obesity 06/03/2016     Note Last Updated: 6/11/2023     -with comorbidities of HTN, DM, hyperlipidemia, LBP    Impression: 03/01/2016 - challenged patient to get below 200lbs; regular exercise with adequate intensity and portion size control;      • Onychomycosis of toenail 06/03/2016     Note Last Updated: 12/2/2022     Impression: 04/21/2015 - discussed keeping toenail trimmed as he is doing; OK to cont current topical OTC therapy; OK to try teatree oil; no indication with mild nature to pursue oral antifungal;      • Seborrheic keratosis 06/03/2016     Note Last Updated: 6/3/2016     Impression: 04/21/2015 - L. temple area seems to be SK; R. cheek area is SK versus AK; he also notes new skin tag at inenr R. 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 Acosta - no DMR; 9/14/23 add mariaelena 15mg QD; 9/7/23 unchanged A1C 6.7; 6/7/23 A1C 6.7; 4/14/23 ophtho/Dr. Contreras - no DMR, RTC 6 mos for f/u bord glaucoma OD; 3.14.23 invokana denied by insurance, change to jardiance 25mg QD; 3/7/23 improved A1C 6.6; 11/29/22 worsened A1C 7.7; 9/7/22 A1C 6.5; 5/31/22 improved A1C 6.5; 3/25/22 A1C 7.3 incr ozempic 1mg weekly, cont met ER 2000mg QD and invokana 300mg QD; 10/15/21 A1C 7.4; cont invokana 300mg QD and met ER 2000mg QD, change trulicity to ozempic; 5/28/21 A1C 5.93; 4/9/21 A1C 6.2; on met ER 500mg 4 QD, invokana 300mg QD, trulicity 1.5mg weekly; 1/8/21 A1C worsened 8.5; on invokana 300mg QD, met ER 500mg 4 QD; add Trulicity 0.75mg weekly;  9/4/20 A1C 7.2; 6/10/2020 opto/Dr. Keith - mild-mod nonproliferative DM retinopathy, RTC 3 mos; 5/26/20 A1C worsened 6.93; 2/21/20 A1C 6.6 (was 6.8 in 11/19); on invokana 300mg QD and met ER 500mg 4 QD;  1/11/18 opto/Dr. Keith - no DM retinopathy; 03/01/2016 - BG control worsened; encouraged consistency with exercise and moderation in unhealthy starches/sweets; remains on metformin and invokana; f/u A1C in 3 mos - may need to add anti DM meds if no better  Impression: 11/06/2015 - BG control stable with goal A1C < 6.5; encouraged increased phys activity; cont invokana and metformint; f/u A1C in 3 mos  Impression: 07/28/2015 - BGs continue to improve; cont met ER and invokana; f/u in 3 mos with A1C; cont with healthy diet and phys activity  Impression: 05/29/2015 - proceed to 300mg QD and continue metformin ER 500mg 2 QD; samples provided and RX for #30, 5RF; RTC 2 mos with A1C  Impression: 04/21/2015 - BG control worsened; agree with exercise intervention;  cont metformin; change januvia to invokana 100mg QD - samples provided, reviewed potential side effects and how to take correctly; RTC in 1 month to review BG readings and plan for repeat A1C in 3 mos; call with questions in the interim  Impression: 01/09/2015 - BG control worsened; cont metformin #180, 3RF and januvia #90, 3RF and resume healthy diet and more reg aerobic activity; referral to DM nutrition as requested by patient; f/u A1C in 3 mos  Impression: 08/30/2014 - BG control significantly improved with addition of januvia #30, 5RF; cont metformin with increased phys activity and moderation in unhealthy starches; repeat A1C with PE labs  Impression: 05/12/2014 - BG control much worsened; reviewed med options and common side effects; emphasized importance of resumign reg aerobic activity to decr insulin resistance; in the interim, incr metformin ER to 1000mg 2 tabs QD #180, 1RF and add januvia 100mg QD #30, 3RF; encouraged monitoring some BGs; repeat A1C in 3 mos; Description: no DM retinopathy (11/14); opto - Dr. Keith     • Vitamin D deficiency 06/03/2016     Note Last Updated: 6/12/2024 6/12/24 vit D 41.3; 9/7/23 vit D 34.8; no supplement; 6/7/23 vit D 49.1; advised to stop supplement d/t hyperCa;  5/31/22 vit D 61; 5/28/21 vit D 54.0; 5/26/20 stable level 51.4; 03/01/2016 - just started 5000 units QD 2 weeks ago; f/u level in 3 mos  Impression: 07/28/2015 - reviewed nl level in 4/15; cont supplementation and check with next PE labs  Impression: 04/21/2015 - resolved with current supplementation  Impression: 01/09/2015 - vit D level bord; incr vit D to 4000units QD in the winter months, OK to resume 2000units in the spring/summer months; f/u level in 3 mos  Impression: 05/12/2014 - borderline but expect improvement in the next 4 months with increased sun exposure; definitely should maintain daily vit D supplementation in the fall/winter months  Impression: 02/10/2014 - incr vit D to 4000 units QD and  add ergo 50,000 units weekly x 8weeks; f/u vit D level in 3 mos;      • Actinic keratoses 06/03/2016     Note Last Updated: 6/3/2016     Derm Roberto Tran     • Low back pain 06/01/2009     Note Last Updated: 6/7/2022     Formatting of this note might be different from the original.     • Neck pain 02/05/2009     Note Last Updated: 6/7/2022     Formatting of this note might be different from the original.     • Seasonal allergic rhinitis due to pollen 11/12/2008     Past Medical History:   Diagnosis Date   • Bee sting 06/03/2016    Impression: 08/30/2014 - precautions discussed; has updated Epi Pen  Impression: 06/30/2014 - borderline allergic reaction but could be as extensive as it is due to the numerous stings he sustained; DXM 1cc IM x1 in the office; RXs for prednisone taper #36, 0RF and epi pen #1, 0RF; also rec OTC antihsitamine QD and zantac/pepcid BID, aveeno oatmeal baths, and avoidance of heat and hot showers;    • CTS (carpal tunnel syndrome)     right wrist   • Hx of anaphylaxis 08/04/2019    bee sting (8/4/19), went to ER, has Epi pen   • Hx of colonoscopy 06/15/2015    nl colonosc (6/15/15), repeat 5 yrs; ARABELLA Brandon   • Hx of colonoscopy 09/21/2020    colonosc (9/21/20): diverticulosis, enlarged prostate, repeat 5 yrs; ARABELLA Brandon   • Hx of MRI R shoulder 10/07/2024    MRI R shoulder (10/7/24): rotator cuff tendinopathy (supraspinatus and subscapularis), mod AC and mild GH degen changes; edema/thickening of axillary pouch sugg of capsulitis; minimal biceps tenosynovitis   • Rotator cuff syndrome August, 2024     Past Surgical History:   Procedure Laterality Date   • CARPAL TUNNEL RELEASE Right 10/2017    ortho - Dr. Bertrand   • COLONOSCOPY     • TONSILLECTOMY  1970    age 10   • WRIST SURGERY      Right wrist, Capal tunnel       Subjective Questionnaire: QuickDASH: 13.64      Subjective Evaluation    History of Present Illness  Mechanism of injury:         See MRI report below:     MRI SHOULDER  RIGHT WO CONTRAST     Date of Exam: 10/7/2024 8:44 AM EDT     Indication: persistent R shoulder pain with decr'd ROM x 2 mos, s/p PT and conservative care.     Comparison: None available.     Technique:  Routine multiplanar/multisequence images of the right shoulder were obtained without contrast administration.          Findings:  Acromioclavicular joint: Moderate degenerative changes of the acromioclavicular joint. No substantial  subacromial/subdeltoid bursal fluid/edema. Type I acromion. Os acromiale is absent.     Rotator cuff: Tendinopathy of the supraspinatus tendon. No tear or substantial tendinopathy of the infraspinatus tendon. Tendinopathy with low-grade interstitial tearing of the distal inferior fibers of the subscapularis tendon. No tear or substantial   tendinopathy of the teres minor tendon. No fatty atrophy or edematous changes of the the rotator cuff muscles.     Labrum: Degeneration and tearing of the labrum particularly anteriorly and superiorly.     Biceps tendon: Normal position and signal of the long head of the biceps tendon. Minimal tenosynovitis.     Joint: Mild glenohumeral chondral thinning. Articular cartilage is intact. No subchondral edema. Thickening and edema of the anterior aspect of the axillary pouch.     Bones:  No fracture or marrow edema. No suspicious marrow replacing lesions.     Soft tissues: No soft tissue masses or fluid collections seen.     IMPRESSION:  Impression:  Edema and thickening of the axillary pouch, predominantly anteriorly suggestive of capsulitis.     Rotator cuff tendinopathy without high-grade or full-thickness tearing.     Moderate acromioclavicular and mild glenohumeral degenerative changes.     Minimal biceps tenosynovitis.    Electronically Signed: Donald James MD    10/8/2024 4:58 PM EDT    Workstation ID: [id number]             Objective       Assessment/Plan        Timed:         Manual Therapy:    ***     mins  42678;     Therapeutic Exercise:     ***     mins  48115;     Neuromuscular Keely:    ***    mins  09955;    Therapeutic Activity:     ***     mins  58856;     Gait Training:      ***     mins  59645;     Ultrasound:     ***     mins  29705;    Ionto                               ***    mins   21029  Self Care                       ***     mins   21126  Canalith Repos    ***     mins 92440      Un-Timed:  Electrical Stimulation:    ***     mins  46979 ( );  Dry Needling     ***     mins self-pay  Traction     ***     mins 85752    Low Eval     ***     Mins  03536  Mod Eval     ***     Mins  38272  High Eval                       ***     Mins  48510        Timed Treatment:   ***   mins   Total Treatment:     ***   mins          PT: GAYLA Sorensen PT     License Number: [id number]  Electronically signed by GAYLA Sorensen PT, 11/06/24, 10:11 AM EST    Certification Period: 11/6/2024 thru 2/3/2025  I certify that the therapy services are furnished while this patient is under my care.  The services outlined above are required by this patient, and will be reviewed every 90 days.         Physician Signature:__________________________________________________    PHYSICIAN: Javier Zendejas MD  NPI: [id number]                                      DATE:      Please sign and return via fax to [email or web address] . Thank you, [facility].

## 2024-11-13 ENCOUNTER — TREATMENT (OUTPATIENT)
Dept: PHYSICAL THERAPY | Facility: CLINIC | Age: 64
End: 2024-11-13
Payer: COMMERCIAL

## 2024-11-13 DIAGNOSIS — M75.01 ADHESIVE CAPSULITIS OF RIGHT SHOULDER: Primary | ICD-10-CM

## 2024-11-13 NOTE — PROGRESS NOTES
"Physical Therapy Daily Treatment Note  [place]     Patient: Bradley Ortiz   : 1960  Referring practitioner: Javier Zendejas MD  Date of Initial Visit: Type: THERAPY  Noted: 2024  Today's Date: 2024  Patient seen for 2 sessions       Visit Diagnoses:    ICD-10-CM ICD-9-CM   1. Adhesive capsulitis of right shoulder  M75.01 726.0       Visit #: 2    Subjective   Exercises are working good ; improved IR HBB but still uncomfortable     Objective   See Exercise, Manual, and Modality Logs for complete treatment    Improved IR HBB     [employer] Exercises:  Access Code: [email or web address]  URL: [email or web address]  Date: 2024  Prepared by: Moustapha Sorensen     Exercises  - Sleeper Stretch (Mirrored)  - 2 x daily - 7 x weekly - 1 sets - 5 reps - 20\" hold  - Standing Shoulder Internal Rotation Stretch with Towel  - 2 x daily - 7 x weekly - 1 sets - 5 reps - 20\" hold  - Shoulder Scaption AAROM with Dowel  - 3 x daily - 7 x weekly - 3 sets - 10 reps  - Seated Shoulder Inferior Glide (Mirrored)  - 3 x daily - 7 x weekly - 3 sets - 10 reps  - Seated Shoulder Inferior Glide in Abduction Below 90  - 2 x daily - 7 x weekly - 1 sets - 10 reps - 5\" hold    UBE x 8 min; pulleys; dowel shoulder extensions/ IR up the back 2 x 10 each ; [place] seated for multi-plane ; wall IR self-mobs     Assessment/Plan  Bradley Ortiz needs continued skilled Physical Therapy services for decreasing pain while improving mobility, strength, balance and endurance in order to return to work and/or activities of daily living without pain or dysfunction      Treatment considerations for next visit:  Advance as tolerated     Timed:   Manual Therapy:    10     mins  75888;     Therapeutic Exercise:    20     mins  24376;     Neuromuscular Keely:        mins  38954;    Therapeutic Activity:     10     mins  03356;     Gait Training:           mins  74450;     Ultrasound:          mins  27717;    Ionto                                "    mins   46770  Self Care                            mins   95781  Canalith Repos         mins   66661    Un-Timed:  Electrical Stimulation:         mins  21317 ( );  Dry Needling          mins self-pay  Traction          mins 02763      Timed Treatment:   40   mins   Total Treatment:     40   mins    GAYLA Sorensen, PT  [place] License: [id number]

## 2024-11-20 ENCOUNTER — TREATMENT (OUTPATIENT)
Dept: PHYSICAL THERAPY | Facility: CLINIC | Age: 64
End: 2024-11-20
Payer: COMMERCIAL

## 2024-11-20 DIAGNOSIS — M75.01 ADHESIVE CAPSULITIS OF RIGHT SHOULDER: Primary | ICD-10-CM

## 2024-11-20 NOTE — PROGRESS NOTES
"Physical Therapy Daily Treatment Note  [place]     Patient: Bradley Ortiz   : 1960  Referring practitioner: Javier Zendejas MD  Date of Initial Visit: Type: THERAPY  Noted: 2024  Today's Date: 2024  Patient seen for 3 sessions       Visit Diagnoses:    ICD-10-CM ICD-9-CM   1. Adhesive capsulitis of right shoulder  M75.01 726.0       Visit #: 3    Subjective   Continued pain with overhead; avoid using it overhead    Objective   See Exercise, Manual, and Modality Logs for complete treatment    Full passive motion  IR HBB improved      [employer] Exercises:  Access Code: [email or web address]  URL: [email or web address]  Date: 2024  Prepared by: Moustapha Sorensen     Exercises  - Sleeper Stretch (Mirrored)  - 2 x daily - 7 x weekly - 1 sets - 5 reps - 20\" hold  - Standing Shoulder Internal Rotation Stretch with Towel  - 2 x daily - 7 x weekly - 1 sets - 5 reps - 20\" hold  - Shoulder Scaption AAROM with Dowel  - 3 x daily - 7 x weekly - 3 sets - 10 reps  - Seated Shoulder Inferior Glide (Mirrored)  - 3 x daily - 7 x weekly - 3 sets - 10 reps  - Seated Shoulder Inferior Glide in Abduction Below 90  - 2 x daily - 7 x weekly - 1 sets - 10 reps - 5\" hold     UBE x 8 min; pulleys; dowel shoulder extensions/ IR up the back 2 x 10 each ; [place] seated for multi-plane ; wall IR self-mobs   Blue tband overhead reaches, W's ; scaption    Assessment/Plan  Added 3 strength exercises for scapular stability and overhead motion    Treatment considerations for next visit:  Advance as tolerated     Timed:   Manual Therapy:    10     mins  76477;     Therapeutic Exercise:    15     mins  38256;     Neuromuscular Keely:        mins  21304;    Therapeutic Activity:     15     mins  30817;     Gait Training:           mins  60409;     Ultrasound:          mins  68098;    Ionto                                   mins   94560  Self Care                            mins   41499  Canalith Repos         mins   " 46447    Un-Timed:  Electrical Stimulation:         mins  44340 ( );  Dry Needling          mins self-pay  Traction          mins 51910      Timed Treatment:   40   mins   Total Treatment:     40   mins    GAYLA Sorensen, PT  [place] License: [id number]

## 2024-11-27 ENCOUNTER — TREATMENT (OUTPATIENT)
Dept: PHYSICAL THERAPY | Facility: CLINIC | Age: 64
End: 2024-11-27
Payer: COMMERCIAL

## 2024-11-27 DIAGNOSIS — M75.01 ADHESIVE CAPSULITIS OF RIGHT SHOULDER: Primary | ICD-10-CM

## 2024-11-27 NOTE — PROGRESS NOTES
"Physical Therapy Daily Treatment Note  [place]    Patient: Bradley Ortiz   : 1960  Referring practitioner: Javier Zendejas MD  Date of Initial Visit: Type: THERAPY  Noted: 2024  Today's Date: 2024  Patient seen for 4 sessions       Visit Diagnoses:    ICD-10-CM ICD-9-CM   1. Adhesive capsulitis of right shoulder  M75.01 726.0       Visit #: 4    Subjective   Feels better after I stretch it out in therapy  Avoid overhead movements so as not to aggravate it    Objective   See Exercise, Manual, and Modality Logs for complete treatment    Full passive/active motion after therapy     [employer] Exercises:  Exercises  - Sleeper Stretch (Mirrored)  - 2 x daily - 7 x weekly - 1 sets - 5 reps - 20\" hold  - Standing Shoulder Internal Rotation Stretch with Towel  - 2 x daily - 7 x weekly - 1 sets - 5 reps - 20\" hold  - Shoulder Scaption AAROM with Dowel  - 3 x daily - 7 x weekly - 3 sets - 10 reps  - Seated Shoulder Inferior Glide (Mirrored)  - 3 x daily - 7 x weekly - 3 sets - 10 reps  - Seated Shoulder Inferior Glide in Abduction Below 90  - 2 x daily - 7 x weekly - 1 sets - 10 reps - 5\" hold     UBE x 8 min; pulleys; dowel shoulder extensions/ IR up the back 2 x 10 each ; [place] seated for multi-plane ; wall IR self-mobs   Blue tband overhead reaches, W's ; scaption     Added: IR behind back with pulleys    Assessment/Plan  Recommend 1 x week for next 3-4 weeks prior to f/u with MD; going to order pulley for home as he feels that helps work out the tightness really effectively    Treatment considerations for next visit:  Advance end range pain-free mobility, and strength as tolerated     Timed:   Manual Therapy:    10     mins  64035;     Therapeutic Exercise:    15     mins  48060;     Neuromuscular Keely:        mins  91111;    Therapeutic Activity:     15     mins  14161;     Gait Training:           mins  55889;     Ultrasound:          mins  11854;    Ionto                                   mins "   85593  Self Care                            mins   07723  Canalith Repos         mins   12088    Un-Timed:  Electrical Stimulation:         mins  03949 ( );  Dry Needling          mins self-pay  Traction          mins 36483      Timed Treatment:   40   mins   Total Treatment:     40   mins    GAYLA Sorensen, PT  [place] License: [id number]

## 2024-12-04 ENCOUNTER — TREATMENT (OUTPATIENT)
Dept: PHYSICAL THERAPY | Facility: CLINIC | Age: 64
End: 2024-12-04
Payer: COMMERCIAL

## 2024-12-04 DIAGNOSIS — M75.01 ADHESIVE CAPSULITIS OF RIGHT SHOULDER: Primary | ICD-10-CM

## 2024-12-04 NOTE — PROGRESS NOTES
"Physical Therapy Daily Treatment Note  [place]    Patient: Bradley Ortiz   : 1960  Referring practitioner: Javier Zendejas MD  Date of Initial Visit: Type: THERAPY  Noted: 2024  Today's Date: 2024  Patient seen for 5 sessions       Visit Diagnoses:    ICD-10-CM ICD-9-CM   1. Adhesive capsulitis of right shoulder  M75.01 726.0       Visit #: 5    Subjective   I think it's starting to get better; patient got pulleys for home and feels like that is helping    Objective   See Exercise, Manual, and Modality Logs for complete treatment    Improved mobility of right shoulder  Improved tolerance to overhead activity    [employer] Exercises:  Exercises  - Sleeper Stretch (Mirrored)  - 2 x daily - 7 x weekly - 1 sets - 5 reps - 20\" hold  - Standing Shoulder Internal Rotation Stretch with Towel  - 2 x daily - 7 x weekly - 1 sets - 5 reps - 20\" hold  - Shoulder Scaption AAROM with Dowel  - 3 x daily - 7 x weekly - 3 sets - 10 reps  - Seated Shoulder Inferior Glide (Mirrored)  - 3 x daily - 7 x weekly - 3 sets - 10 reps  - Seated Shoulder Inferior Glide in Abduction Below 90  - 2 x daily - 7 x weekly - 1 sets - 10 reps - 5\" hold     UBE x 8 min; pulleys; dowel shoulder extensions/ IR up the back 2 x 10 each ; [place] seated for multi-plane ; wall IR self-mobs   Maroon tube overhead reaches 3 x 10, W's blue 30 x  ; scaption with 5 pounds bilaterally 3 x 10     Added: IR behind back with pulleys    Assessment/Plan  Bradley Ortiz needs continued skilled Physical Therapy services for decreasing pain while improving mobility, strength, balance and endurance in order to return to work and/or activities of daily living without pain or dysfunction      Treatment considerations for next visit:  Advance as tolerated    Timed:   Manual Therapy:    10     mins  61706;     Therapeutic Exercise:    15     mins  88224;     Neuromuscular Keely:        mins  27184;    Therapeutic Activity:     15     mins  96740;     Gait " Training:           mins  20579;     Ultrasound:          mins  94282;    Ionto                                   mins   44285  Self Care                            mins   20048  Canalith Repos         mins   92813    Un-Timed:  Electrical Stimulation:         mins  71889 ( );  Dry Needling          mins self-pay  Traction          mins 96783      Timed Treatment:   40   mins   Total Treatment:     40   mins    GAYLA Sorensen, PT  [place] License: [id number]

## 2024-12-11 ENCOUNTER — TREATMENT (OUTPATIENT)
Dept: PHYSICAL THERAPY | Facility: CLINIC | Age: 64
End: 2024-12-11
Payer: COMMERCIAL

## 2024-12-11 DIAGNOSIS — M75.01 ADHESIVE CAPSULITIS OF RIGHT SHOULDER: Primary | ICD-10-CM

## 2024-12-11 NOTE — PROGRESS NOTES
"Physical Therapy Daily Treatment Note  [place]     Patient: Bradley Ortiz   : 1960  Referring practitioner: Javier Zendejas MD  Date of Initial Visit: Type: THERAPY  Noted: 2024  Today's Date: 2024  Patient seen for 6 sessions       Visit Diagnoses:    ICD-10-CM ICD-9-CM   1. Adhesive capsulitis of right shoulder  M75.01 726.0       Visit #: 6    Subjective   Feels a lot better; very mild pain at end range but way better    Objective   See Exercise, Manual, and Modality Logs for complete treatment    Full motion, mild pain at end range     [employer] Exercises:  Exercises  - Sleeper Stretch (Mirrored)  - 2 x daily - 7 x weekly - 1 sets - 5 reps - 20\" hold  - Standing Shoulder Internal Rotation Stretch with Towel  - 2 x daily - 7 x weekly - 1 sets - 5 reps - 20\" hold  - Shoulder Scaption AAROM with Dowel  - 3 x daily - 7 x weekly - 3 sets - 10 reps  - Seated Shoulder Inferior Glide (Mirrored)  - 3 x daily - 7 x weekly - 3 sets - 10 reps  - Seated Shoulder Inferior Glide in Abduction Below 90  - 2 x daily - 7 x weekly - 1 sets - 10 reps - 5\" hold     UBE x 8 min; pulleys; dowel shoulder extensions/ IR up the back 2 x 10 each ; [place] seated for multi-plane ; wall IR self-mobs   Maroon tube overhead reaches 3 x 10, W's blue 30 x  ; scaption with 5 pounds bilaterally 3 x 10     Added: IR behind back with pulleys  Orange band rows 30x    Assessment/Plan  Bradley Ortiz needs continued skilled Physical Therapy services for decreasing pain while improving mobility, strength, balance and endurance in order to return to work and/or activities of daily living without pain or dysfunction      Treatment considerations for next visit:  Advance as tolerated     Timed:   Manual Therapy:    10     mins  42361;     Therapeutic Exercise:    15     mins  30474;     Neuromuscular Keely:        mins  35264;    Therapeutic Activity:     15     mins  07002;     Gait Training:           mins  70985;   "   Ultrasound:          mins  41183;    Ionto                                   mins   22325  Self Care                            mins   38015  Canalith Repos         mins   37725    Un-Timed:  Electrical Stimulation:         mins  54268 ( );  Dry Needling          mins self-pay  Traction          mins 63381      Timed Treatment:   40   mins   Total Treatment:     40   mins    GAYLA Sorensen, PT  [place] License: [id number]

## 2024-12-16 NOTE — PROGRESS NOTES
"Chief Complaint   Patient presents with    Diabetes    Hypertension       History of Present Illness  64 y.o.  male presents for DM follow-up. Fasting and NF BGs around 120s.    Reports shot in R shoulder and continuing PT, now with nearly normal ROM and nearly complete pain resolution.     Review of Systems  Denies CP, SOB, palpitations. All other ROS reviewed and negative.    Current Outpatient Medications:     aspirin 81 MG QD    Cholecalciferol (VITAMIN D3) 5000 UNITS QD    empagliflozin (Jardiance) 25 MG QD    EPINEPHrine (EPIPEN) 0.3 MG/0.3ML prn    fenofibric acid (TRILIPIX) 135 MG QD    icosapent ethyl (Vascepa) 1 g 2 BID    metFORMIN  MG 4 qd    Multiple QD    pioglitazone (ACTOS) 30 MG QD    rosuvastatin 20 MG QD    Semaglutide, 1 MG/DOSE, (Ozempic) weekly    valsartan 160 MG QD    vitamin B-12 (CYANOCOBALAMIN) 500 MCG QD    VITALS:  /78   Pulse 66   Ht 172.7 cm (68\")   Wt 89.1 kg (196 lb 6.4 oz)   SpO2 98%   BMI 29.86 kg/m²     Physical Exam  Vitals and nursing note reviewed.   Constitutional:       General: He is not in acute distress.     Appearance: Normal appearance. He is not ill-appearing.   Eyes:      Extraocular Movements: Extraocular movements intact.      Conjunctiva/sclera: Conjunctivae normal.   Pulmonary:      Effort: Pulmonary effort is normal. No respiratory distress.   Neurological:      Mental Status: He is alert. Mental status is at baseline.   Psychiatric:         Mood and Affect: Mood normal.         Behavior: Behavior normal.         LABS  Results for orders placed or performed in visit on 12/17/24   POC Glycosylated Hemoglobin (Hb A1C)    Collection Time: 12/17/24  8:49 AM    Specimen: Blood   Result Value Ref Range    Hemoglobin A1C 6.0 (A) 4.5 - 5.7 %    Lot Number [id number]     Expiration Date 08/08/2026 9/13/24 A1C 6.2    ASSESSMENT/PLAN    Diagnoses and all orders for this visit:    1. 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 Hypertension  Assessment & Plan:  BP stable 112/78; cont valsartan 160mg QD      3. Adhesive capsulitis of right shoulder  Assessment & Plan:  Much improved with steroid injection and PT; discussed importance of HEP of maintenance          FOLLOW-UP  Health maintenance - refuses COVID19 vacc; flu vacc 10/24; RSV vacc completed  RTC for PE 6/19/25; fasting labs prior to appt (CBC, CMP, TSH, lipids, UA/micr, microalb/Cr, A1C, PSA, CPK, vit D, B12)    Electronically signed by:    Violette Hylton MD, FACP  12/17/2024

## 2024-12-16 NOTE — ASSESSMENT & PLAN NOTE
BG control stable with A1C 6.0; cont Ozempic 1mg weekly, Jardiance 25mg QD, mariaelena 30mg QD, and metformin ER 2000mg QD; encouraged reg phys activity to decr insulin resistance, moderation in unhealthy starches/sweets; f/u A1C in 6 mos with next PE

## 2024-12-17 ENCOUNTER — OFFICE VISIT (OUTPATIENT)
Dept: INTERNAL MEDICINE | Facility: CLINIC | Age: 64
End: 2024-12-17
Payer: COMMERCIAL

## 2024-12-17 VITALS
SYSTOLIC BLOOD PRESSURE: 112 MMHG | HEIGHT: 68 IN | WEIGHT: 196.4 LBS | HEART RATE: 66 BPM | DIASTOLIC BLOOD PRESSURE: 78 MMHG | BODY MASS INDEX: 29.77 KG/M2 | OXYGEN SATURATION: 98 %

## 2024-12-17 DIAGNOSIS — I10 ESSENTIAL HYPERTENSION: Chronic | ICD-10-CM

## 2024-12-17 DIAGNOSIS — M75.01 ADHESIVE CAPSULITIS OF RIGHT SHOULDER: ICD-10-CM

## 2024-12-17 DIAGNOSIS — E11.3293 TYPE 2 DIABETES MELLITUS WITH BOTH EYES AFFECTED BY MILD NONPROLIFERATIVE RETINOPATHY WITHOUT MACULAR EDEMA, WITHOUT LONG-TERM CURRENT USE OF INSULIN: Primary | Chronic | ICD-10-CM

## 2024-12-17 LAB
EXPIRATION DATE: ABNORMAL
HBA1C MFR BLD: 6 % (ref 4.5–5.7)
Lab: ABNORMAL

## 2024-12-17 PROCEDURE — 99214 OFFICE O/P EST MOD 30 MIN: CPT | Performed by: INTERNAL MEDICINE

## 2024-12-17 PROCEDURE — 83036 HEMOGLOBIN GLYCOSYLATED A1C: CPT | Performed by: INTERNAL MEDICINE

## 2024-12-18 ENCOUNTER — TREATMENT (OUTPATIENT)
Dept: PHYSICAL THERAPY | Facility: CLINIC | Age: 64
End: 2024-12-18
Payer: COMMERCIAL

## 2024-12-18 DIAGNOSIS — M75.01 ADHESIVE CAPSULITIS OF RIGHT SHOULDER: Primary | ICD-10-CM

## 2024-12-18 NOTE — PROGRESS NOTES
"Physical Therapy Daily Treatment Note/ Re-assessment   [place]    Patient: Bradley Ortiz   : 1960  Referring practitioner: Javier Zendejas MD  Date of Initial Visit: Type: THERAPY  Noted: 2024  Today's Date: 2024  Patient seen for 7 sessions       Visit Diagnoses:    ICD-10-CM ICD-9-CM   1. Adhesive capsulitis of right shoulder  M75.01 726.0       Visit #: 7    Subjective   Feels a lot better    Objective   See Exercise, Manual, and Modality Logs for complete treatment    Full mobility     Medbridge Exercises:  Exercises  - Sleeper Stretch (Mirrored)  - 2 x daily - 7 x weekly - 1 sets - 5 reps - 20\" hold  - Standing Shoulder Internal Rotation Stretch with Towel  - 2 x daily - 7 x weekly - 1 sets - 5 reps - 20\" hold  - Shoulder Scaption AAROM with Dowel  - 3 x daily - 7 x weekly - 3 sets - 10 reps  - Seated Shoulder Inferior Glide (Mirrored)  - 3 x daily - 7 x weekly - 3 sets - 10 reps  - Seated Shoulder Inferior Glide in Abduction Below 90  - 2 x daily - 7 x weekly - 1 sets - 10 reps - 5\" hold     UBE x 8 min; pulleys; dowel shoulder extensions/ IR up the back 2 x 10 each ; [place] seated for multi-plane ; wall IR self-mobs   Maroon tube overhead reaches 3 x 10, W's blue 30 x  ; scaption with 6 pounds bilaterally 3 x 10     Added: IR behind back with pulleys  Orange band rows 30x    Assessment/Plan  Patient ready for continuation with HEP; will keep chart open and patient may return if pain returns; if he does not return in one month will be considered d/c from PT; all goals met    Goals  Plan Goals: 2 weeks:  1. IND with HEP  2. Patient to demonstrate 50% improved mobility of right shoulder  3. Patient to participate in up to 30 minutes of PT without a significant increase in pain     4 weeks:  1. Patient to improve QD score by 1 MCID  2. Patient to display full right shoulder mobility   3. Patient to report ability to perform ADL's without mobility dysfunction    Treatment considerations " for next visit:  Hold PT     Timed:   Manual Therapy:    10     mins  97931;     Therapeutic Exercise:    20     mins  85413;     Neuromuscular Keely:        mins  09745;    Therapeutic Activity:     15     mins  04213;     Gait Training:           mins  73746;     Ultrasound:          mins  54211;    Ionto                                   mins   05155  Self Care                            mins   89626  Canalith Repos         mins   54388    Un-Timed:  Electrical Stimulation:         mins  47242 ( );  Dry Needling          mins self-pay  Traction          mins 94685      Timed Treatment:   45   mins   Total Treatment:     45   mins    GAYLA Sorensen, PT  [place] License: [id number]

## 2025-01-29 PROBLEM — S62.101A CLOSED FRACTURE OF BOTH WRISTS: Status: ACTIVE | Noted: 2025-01-29

## 2025-01-29 PROBLEM — S02.92XA CLOSED FRACTURE OF FACIAL BONE DUE TO FALL: Status: ACTIVE | Noted: 2025-01-29

## 2025-01-29 PROBLEM — S62.102A CLOSED FRACTURE OF BOTH WRISTS: Status: ACTIVE | Noted: 2025-01-29
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
## 2025-01-30 ENCOUNTER — TELEPHONE (OUTPATIENT)
Dept: INTERNAL MEDICINE | Facility: CLINIC | Age: 65
End: 2025-01-30
Payer: COMMERCIAL

## 2025-01-30 NOTE — TELEPHONE ENCOUNTER
----- Message from Violette Hylton sent at 1/29/2025  7:21 PM EST -----  Regarding: ER follow-up  Pls let patient (or his wife) know that I reviewed the test results from his ER visit from his terrible fall. Ask him to have his  doctors send us notes and let us know if there is anything we can do for him.

## 2025-02-20 ENCOUNTER — OFFICE VISIT (OUTPATIENT)
Dept: ORTHOPEDIC SURGERY | Facility: CLINIC | Age: 65
End: 2025-02-20
Payer: COMMERCIAL

## 2025-02-20 VITALS
DIASTOLIC BLOOD PRESSURE: 82 MMHG | HEIGHT: 68 IN | SYSTOLIC BLOOD PRESSURE: 114 MMHG | WEIGHT: 184 LBS | BODY MASS INDEX: 27.89 KG/M2

## 2025-02-20 DIAGNOSIS — M75.00 DIABETIC FROZEN SHOULDER ASSOCIATED WITH TYPE 2 DIABETES MELLITUS: Primary | ICD-10-CM

## 2025-02-20 DIAGNOSIS — M75.41 IMPINGEMENT SYNDROME OF RIGHT SHOULDER: ICD-10-CM

## 2025-02-20 DIAGNOSIS — M75.01 ADHESIVE CAPSULITIS OF RIGHT SHOULDER: ICD-10-CM

## 2025-02-20 DIAGNOSIS — E11.618 DIABETIC FROZEN SHOULDER ASSOCIATED WITH TYPE 2 DIABETES MELLITUS: Primary | ICD-10-CM

## 2025-02-20 RX ORDER — METHOCARBAMOL 500 MG/1
500 TABLET, FILM COATED ORAL 4 TIMES DAILY
COMMUNITY
Start: 2025-02-04

## 2025-02-20 RX ORDER — LIDOCAINE HYDROCHLORIDE 10 MG/ML
5 INJECTION, SOLUTION EPIDURAL; INFILTRATION; INTRACAUDAL; PERINEURAL
Status: COMPLETED | OUTPATIENT
Start: 2025-02-20 | End: 2025-02-20

## 2025-02-20 RX ORDER — TRIAMCINOLONE ACETONIDE 40 MG/ML
40 INJECTION, SUSPENSION INTRA-ARTICULAR; INTRAMUSCULAR
Status: COMPLETED | OUTPATIENT
Start: 2025-02-20 | End: 2025-02-20

## 2025-02-20 RX ADMIN — TRIAMCINOLONE ACETONIDE 40 MG: 40 INJECTION, SUSPENSION INTRA-ARTICULAR; INTRAMUSCULAR at 09:27

## 2025-02-20 RX ADMIN — LIDOCAINE HYDROCHLORIDE 5 ML: 10 INJECTION, SOLUTION EPIDURAL; INFILTRATION; INTRACAUDAL; PERINEURAL at 09:27

## 2025-02-20 NOTE — PROGRESS NOTES
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 provider found  - I appreciate the referral    History of Present Illness:   It has been 4  month(s) since Bradley Ortiz's last visit. Bradley Ortiz returns to clinic today for F/U: follow-up of rightBody Part: shoulderReason: pain. The issue has been ongoing for 6 month(s). Bradley Ortiz rates HIS/HER: hispain at 4-6/10 on the pain scale. Previous/current treatments: NSAIDS, physical therapy, and steroid injection (last injection 10/24/24). Current symptoms:Symptoms: pain and stiffness. The pain is worse with sleeping, lying on affected side, and certain movements such as reaching up or behind his back ; resting, heat, and pain medication and/or NSAID improves the pain. Overall, he/she: he was doing better but trauma has exacerbated. I have reviewed the patient's history of present illness as noted/entered above.    I have reviewed the patient's past medical history, surgical history, social history, family history, medications, and allergies as noted in the electronic medical record and as noted/entered.  I have reviewed the patient's review of systems as noted/enter and updated as noted in the patient's HPI.      RIGHT SHOULDER pain  Worse x2 months  MRI RIGHT SHOULDER completed, reviewed  Stabbing pain  Completed PT  Pain persists  Type 2 diabetes mellitus  Retired, originally from [employer]  Diabetic Frozen Shoulder   Supportive wife present.  He enjoys staying busy and babysitting grandchild           64 y.o. male  Body mass index is 29.65  kg/m².    2/20/2025:  RIGHT SHOULDER  Diabetic frozen shoulder  U/S guided injection 10/24/24 with Dianelys -- really helped;    ER visit noted; facial trauma and bilateral wrist fractures -  Plastic surgery managing  Pre-trauma the shoulder was doing very well, injection and PT helped  PT with Javier Sorensen at [place]  New Rx for OT provided since he will already be seeing  OT for his bilateral wrist injuries/s/p surgery  Fall off his truck        Subjective   Subjective      Review of Systems   Constitutional: Negative.  Negative for chills, fatigue and fever.   HENT: Negative.  Negative for congestion and dental problem.    Eyes: Negative.  Negative for blurred vision.   Respiratory: Negative.  Negative for shortness of breath.    Cardiovascular: Negative.  Negative for leg swelling.   Gastrointestinal: Negative.  Negative for abdominal pain.   Endocrine: Negative.  Negative for polyuria.   Genitourinary: Negative.  Negative for difficulty urinating.   Musculoskeletal:  Positive for arthralgias.   Skin: Negative.    Allergic/Immunologic: Negative.    Neurological: Negative.    Hematological: Negative.  Negative for adenopathy.   Psychiatric/Behavioral: Negative.  Negative for behavioral problems.         Past Medical History:   Past Medical History:   Diagnosis Date    Bee sting 06/03/2016    Impression: 08/30/2014 - precautions discussed; has updated Epi Pen  Impression: 06/30/2014 - borderline allergic reaction but could be as extensive as it is due to the numerous stings he sustained; DXM 1cc IM x1 in the office; RXs for prednisone taper #36, 0RF and epi pen #1, 0RF; also rec OTC antihsitamine QD and zantac/pepcid BID, aveeno oatmeal baths, and avoidance of heat and hot showers;     CTS (carpal tunnel syndrome)     right wrist    Dislocation of finger January 2025    Fracture of wrist January 2025    Hx of anaphylaxis 08/04/2019    bee sting (8/4/19), went to ER, has Epi pen    Hx of colonoscopy  06/15/2015    nl colonosc (6/15/15), repeat 5 yrs; ARABELLA Brandon    Hx of colonoscopy 09/21/2020    colonosc (9/21/20): diverticulosis, enlarged prostate, repeat 5 yrs; ARABELLA Brandon    Hx of MRI R shoulder 10/07/2024    MRI R shoulder (10/7/24): rotator cuff tendinopathy (supraspinatus and subscapularis), mod AC and mild GH degen changes; edema/thickening of axillary pouch sugg of capsulitis; minimal biceps tenosynovitis    Rotator cuff syndrome August, 2024       Past Surgical History:   Past Surgical History:   Procedure Laterality Date    CARPAL TUNNEL RELEASE Right 10/2017    ortho - Dr. Bertrand    ORIF WRIST FRACTURE Right 01/30/2025    s/p ORIF (1/30/25) for distal R radius fx;  surg - Dr. Andino    TONSILLECTOMY  1970    age 10    WRIST SURGERY      Right wrist, Capal tunnel       Family History:   Family History   Problem Relation Age of Onset    Cancer Father         Bladder cancer    Diverticulitis Father     Hypertension Father     Colon polyps Father     Coronary artery disease Father         CABG    Hyperlipidemia Father         hyperTG    Diabetes Maternal Uncle     Diabetes Maternal Grandmother     Diabetes Maternal Uncle        Social History:   Social History     Socioeconomic History    Marital status:     Number of children: 2   Tobacco Use    Smoking status: Never     Passive exposure: Never    Smokeless tobacco: Never   Vaping Use    Vaping status: Never Used   Substance and Sexual Activity    Alcohol use: No    Drug use: No    Sexual activity: Yes     Partners: Female       Medications:   Current Outpatient Medications:     aspirin 81 MG tablet, Take 1 tablet by mouth Daily., Disp: , Rfl:     Cholecalciferol (VITAMIN D3) 5000 UNITS capsule capsule, Take 1 capsule by mouth Daily., Disp: , Rfl:     empagliflozin (Jardiance) 25 MG tablet tablet, Take 1 tablet by mouth Daily., Disp: 90 tablet, Rfl: 3    EPINEPHrine (EPIPEN) 0.3 MG/0.3ML solution auto-injector injection, USE AS  "DIRECTED, Disp: 1 each, Rfl: 1    fenofibric acid (TRILIPIX) 135 MG capsule delayed-release delayed release capsule, Take 1 capsule by mouth Daily., Disp: 90 capsule, Rfl: 3    glucose blood (OneTouch Verio) test strip, Use as instructed, Disp: 100 each, Rfl: 3    icosapent ethyl (Vascepa) 1 g capsule capsule, Take 2 g by mouth 2 (Two) Times a Day With Meals., Disp: 360 capsule, Rfl: 3    metFORMIN ER (GLUCOPHAGE-XR) 500 MG 24 hr tablet, Take 4 tablets by mouth Daily With Breakfast., Disp: 360 tablet, Rfl: 3    methocarbamol (ROBAXIN) 500 MG tablet, Take 1 tablet by mouth 4 (Four) Times a Day., Disp: , Rfl:     Multiple Vitamins-Minerals (MULTIVITAMIN ADULT PO), Take  by mouth Daily., Disp: , Rfl:     OneTouch Delica Lancets 33G misc, 1 each 3 (Three) Times a Day., Disp: 100 each, Rfl: 3    pioglitazone (ACTOS) 30 MG tablet, Take 1 tablet by mouth Daily., Disp: 90 tablet, Rfl: 3    rosuvastatin (CRESTOR) 20 MG tablet, Take 1 tablet by mouth Daily., Disp: 90 tablet, Rfl: 3    Semaglutide, 1 MG/DOSE, (Ozempic, 1 MG/DOSE,) 4 MG/3ML solution pen-injector, Inject 1 mg under the skin into the appropriate area as directed 1 (One) Time Per Week., Disp: 9 mL, Rfl: 3    valsartan (DIOVAN) 160 MG tablet, Take 1 tablet by mouth Daily., Disp: 90 tablet, Rfl: 2    vitamin B-12 (CYANOCOBALAMIN) 500 MCG tablet, Take 1 tablet by mouth Daily., Disp: , Rfl:     Allergies:   Allergies   Allergen Reactions    Bee Venom Anaphylaxis     Passed out, tingling in throat, right hand swelling (ER 8/4/19)    Triamterene Other (See Comments)     Hypercalcemia       The following portions of the patient's history were reviewed and updated as appropriate: allergies, current medications, past family history, past medical history, past social history, past surgical history and problem list.        Objective    Objective      Vital Signs:   Vitals:    02/20/25 0900   BP: 114/82   Weight: 83.5 kg (184 lb)   Height: 172.7 cm (68\")       Ortho " Exam:  RIGHT SHOULDER  Painful arc of motion with ER/IR  Good RC strength    Results Review:  Imaging Results (Last 24 Hours)       Procedure Component Value Units Date/Time    US Guided Injection [[id number]] Resulted: 02/20/25 0928     Updated: 02/20/25 0948            Procedures    U/S guided injection right (GH JOINT) shoulder per NEEL Powers        Assessment / Plan      Assessment/Plan:   Problem List Items Addressed This Visit          Musculoskeletal and Injuries    Impingement syndrome of right shoulder    Overview     10/24/24 ortho/Dr. Zendejas - adhesive capsulitis/bursitis, s/p steroid injection, PT referral, RTC 2 mos;         Relevant Orders    Ambulatory Referral to Occupational Therapy for Evaluation & Treatment (Completed)    US Guided Injection    Adhesive capsulitis of right shoulder    Overview     10/24/24 ortho/Dr. 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  The patient will keep me updated pending any changes.          Javier Zendejas MD, FAAOS  Orthopedic Surgeon, Shoulder Surgery  [facility]  Orthopedics and Sports Medicine  [address] [place] [address], [address]  [place] [address]    &  New Location:  [facility] Location  [address], [address]  [place] [address]    02/20/25  09:53 EST

## 2025-02-20 NOTE — PROGRESS NOTES
Procedure   - Large Joint Arthrocentesis: R glenohumeral on 2/20/2025 9:27 AM  Indications: pain  Details: 21 G needle, ultrasound-guided posterior approach  Medications: 5 mL lidocaine PF 1% 1 %; 40 mg triamcinolone acetonide 40 MG/ML  Outcome: tolerated well, no immediate complications  Procedure, treatment alternatives, risks and benefits explained, specific risks discussed. Consent was given by the patient. Immediately prior to procedure a time out was called to verify the correct patient, procedure, equipment, support staff and site/side marked as required. Patient was prepped and draped in the usual sterile fashion.      RIGHT SHOULDER ultrasound guided glenohumeral joint injection completed per NEEL Powers.    SHOULDER INJECTION: Risks and benefits of ultrasound-guided glenohumeral joint corticosteroid injection were discussed and the patient desired to proceed. Verbal consent was obtained. The patient understood the risk of infection, potential skin changes, bump in blood glucose especially with diabetes, nerve injury, possibility of increased pain in the short term, and possible incomplete pain relief.  Using a curved linear probe, the glenohumeral joint space was identified with ultrasound guidance.  Under sterile technique, the shoulder space was injected from a posterior approach with 1mL of 40 mg triamcinolone acetonide 40 MG/ML and 5cc of lidocaine with aspiration prior to injection. The patient tolerated the procedure without difficulty.

## 2025-03-03 ENCOUNTER — TREATMENT (OUTPATIENT)
Dept: PHYSICAL THERAPY | Facility: CLINIC | Age: 65
End: 2025-03-03
Payer: COMMERCIAL

## 2025-03-03 DIAGNOSIS — G89.29 CHRONIC RIGHT SHOULDER PAIN: Primary | ICD-10-CM

## 2025-03-03 DIAGNOSIS — M25.511 CHRONIC RIGHT SHOULDER PAIN: Primary | ICD-10-CM

## 2025-03-03 PROCEDURE — 97110 THERAPEUTIC EXERCISES: CPT | Performed by: PHYSICAL THERAPIST

## 2025-03-03 PROCEDURE — 97162 PT EVAL MOD COMPLEX 30 MIN: CPT | Performed by: PHYSICAL THERAPIST

## 2025-03-03 PROCEDURE — 97140 MANUAL THERAPY 1/> REGIONS: CPT | Performed by: PHYSICAL THERAPIST

## 2025-03-03 NOTE — PROGRESS NOTES
Physical Therapy Initial Evaluation and Plan of Care  [address] [address]  [place] [address]    Patient: Bradley Ortiz   : 1960  Diagnosis/ICD-10 Code:  No primary diagnosis found.  Referring practitioner: Javier Zendejas MD  Date of Initial Visit: 3/3/2025  Today's Date: 3/3/2025  Patient seen for Visit count could not be calculated. Make sure you are using a visit which is associated with an episode. session         Visit Diagnoses:  No diagnosis found.      Subjective Questionnaire: QuickDASH: 59.09      Subjective Evaluation    History of Present Illness  Mechanism of injury: Pt is a 64 year old male presenting to the clinic with right shoulder pain. He fell on the , fractured both wrists and dislocated a finger in his left hand. He is going to OT for the wrists. He completed PT for his right shoulder and was doing well prior to the fall. After his fall, sleeping at night was very painful on the right shoulder. He got another injection about a week and a half ago. Reaching overhead and behind his back are bothersome. His shoulder originally started bothering him last summer.      Patient Occupation: Retired Quality of life: excellent    Pain  Current pain rating: 3  At worst pain ratin  Quality: dull ache and throbbing  Relieving factors: heat  Aggravating factors: sleeping, lifting and outstretched reach  Progression: improved    Hand dominance: right    Patient Goals  Patient goals for therapy: decreased pain, increased motion, increased strength, independence with ADLs/IADLs and return to sport/leisure activities             Objective          Tenderness     Right Shoulder  Tenderness in the biceps tendon (proximal) and subscapularis tendon.     Active Range of Motion   Left Shoulder   Flexion: 165 degrees   Abduction: 170 degrees     Right Shoulder   Flexion: 142 degrees   Abduction: 130 degrees     Additional Active Range of Motion Details Apleys ER  right: T2  Apleys ER left: T2  Apleys IR right: L4  Apleys IR left: T8    Strength/Myotome Testing     Left Shoulder     Planes of Motion   Flexion: 5   Abduction: 5   External rotation at 0°: 5   Internal rotation at 0°: 5     Right Shoulder     Planes of Motion   Flexion: 4+   Abduction: 4+   External rotation at 0°: 5   Internal rotation at 0°: 5           Assessment & Plan       Assessment  Impairments: abnormal muscle tone, abnormal or restricted ROM, activity intolerance, impaired physical strength, lacks appropriate home exercise program and pain with function   Functional limitations: lifting, sleeping, pushing, uncomfortable because of pain, reaching behind back, reaching overhead and unable to perform repetitive tasks   Assessment details: Pt is a 64 year old male presenting to the clinic with chronic right shoulder pain. He has been diagnosed with adhesive capsulitis. He demonstrates decreased shoulder AROM, decreased overhead strength, and tenderness of the bicep and subscapularis tendons. His impairments are limiting his ability to sleep comfortably and reach behind his back. Pt would benefit from skilled PT services to address his impairments so he can reach his long term goals.  Prognosis: good    Goals  Plan Goals: SHORT TERM GOALS:     2 weeks  1. Pt I w/ HEP  2. Pt to demonstrate PROM of the right shoulder to WFL to improve ability to perform ADL's  3. Pt to demonstrate ability to perform 30 minutes continuous activity in the clinic without increase in pain     LONG TERM GOALS:   6 weeks  1. Pt to demonstrate AROM of the right shoulder to WNL to allow ability to perform all necessary functional activities  2. Pt to demonstrate ability to lift 5# OH with the right arm without increase in pain  3. Pt to tolerate 60 minutes continuous activity in the clinic without increase in pain      Plan  Therapy options: will be seen for skilled therapy services  Planned modality interventions: cryotherapy, dry  needling, electrical stimulation/Russian stimulation, high voltage pulsed current (pain management), TENS, thermotherapy (hydrocollator packs) and ultrasound  Planned therapy interventions: manual therapy, neuromuscular re-education, postural training, soft tissue mobilization, spinal/joint mobilization, strengthening, stretching, therapeutic activities, home exercise program, functional ROM exercises and body mechanics training  Duration in visits: 1  Duration in weeks: 12  Treatment plan discussed with: patient        History # of Personal Factors and/or Comorbidities: MODERATE (1-2)  Examination of Body System(s): # of elements: MODERATE (3)  Clinical Presentation: STABLE   Clinical Decision Making: MODERATE      Timed:         Manual Therapy:    8     mins  12759;     Therapeutic Exercise:    10     mins  28823;     Neuromuscular Keely:        mins  59037;    Therapeutic Activity:          mins  64191;     Gait Training:           mins  60173;     Ultrasound:          mins  81058;    Ionto                                   mins   02670  Self Care                            mins   80814  Canalith Repos         mins 85370      Un-Timed:  Electrical Stimulation:         mins  42815 ( );  Dry Needling          mins self-pay  Traction          mins 87719  Low Eval          Mins  34497  Mod Eval     28     Mins  40338  High Eval                            Mins  76495        Timed Treatment:   18   mins   Total Treatment:     46   mins          PT: No Blount PT   License Number: [id number]  Electronically signed by No Blount PT, 03/03/25, 8:04 AM EST    Certification Period: 3/3/2025 thru 5/31/2025  I certify that the therapy services are furnished while this patient is under my care.  The services outlined above are required by this patient, and will be reviewed every 90 days.         Physician Signature:__________________________________________________    PHYSICIAN: Javier Zendejas  MD Moustapha  NPI: [id number]                                      DATE:      Please sign and return via fax to [email or web address] . Thank you, [facility].

## 2025-03-10 ENCOUNTER — TREATMENT (OUTPATIENT)
Dept: PHYSICAL THERAPY | Facility: CLINIC | Age: 65
End: 2025-03-10
Payer: COMMERCIAL

## 2025-03-10 DIAGNOSIS — G89.29 CHRONIC RIGHT SHOULDER PAIN: Primary | ICD-10-CM

## 2025-03-10 DIAGNOSIS — M25.511 CHRONIC RIGHT SHOULDER PAIN: Primary | ICD-10-CM

## 2025-03-10 PROCEDURE — 97112 NEUROMUSCULAR REEDUCATION: CPT | Performed by: PHYSICAL THERAPIST

## 2025-03-10 PROCEDURE — 97140 MANUAL THERAPY 1/> REGIONS: CPT | Performed by: PHYSICAL THERAPIST

## 2025-03-10 PROCEDURE — 97110 THERAPEUTIC EXERCISES: CPT | Performed by: PHYSICAL THERAPIST

## 2025-03-10 NOTE — PROGRESS NOTES
Physical Therapy Daily Treatment Note    [facility]   [address], [address] [place] [address]      Patient: Bradley Ortiz   : 1960  Referring practitioner: Javier Zendejas MD  Date of Initial Visit: Type: THERAPY  Noted: 3/3/2025  Today's Date: 3/10/2025  Patient seen for 2 sessions    Certification Period 3/10/2025 thru 2025       Visit Diagnoses:    ICD-10-CM ICD-9-CM   1. Chronic right shoulder pain  M25.511 719.41    G89.29 338.29       Subjective     Pt reports he is doing well. His shoulder feels okay until he gets to end range overhead motion.    Objective   See Exercise, Manual, and Modality Logs for complete treatment.       Assessment/Plan     Pt tolerated treatment well. Initiated more AROM activities with min complaints of pain. He is progressing well and would benefit from continued skilled PT.     Progress per plan of care          Timed:         Manual Therapy:    12     mins  19161;     Therapeutic Exercise:    24     mins  18009;     Neuromuscular Keely:    10    mins  86371;    Therapeutic Activity:          mins  80927;     Gait Training:           mins  85013;     Ultrasound:          mins  58112;    Ionto                                   mins   29524  Self Care                            mins   84991  Canalith Repos         mins 56550  Electrical Stimulation:         mins  75761    Un-Timed:  Electrical Stimulation:         mins  07424 ( );  Dry Needling          mins self-pay  Traction          mins 96622      Timed Treatment:   46   mins   Total Treatment:     46   mins    No Blount, PT, DPT  Physical Therapist

## 2025-03-13 ENCOUNTER — LAB (OUTPATIENT)
Dept: LAB | Facility: HOSPITAL | Age: 65
End: 2025-03-13
Payer: COMMERCIAL

## 2025-03-13 ENCOUNTER — OFFICE VISIT (OUTPATIENT)
Dept: INTERNAL MEDICINE | Facility: CLINIC | Age: 65
End: 2025-03-13
Payer: COMMERCIAL

## 2025-03-13 ENCOUNTER — TELEPHONE (OUTPATIENT)
Dept: INTERNAL MEDICINE | Facility: CLINIC | Age: 65
End: 2025-03-13

## 2025-03-13 VITALS
BODY MASS INDEX: 28.49 KG/M2 | WEIGHT: 188 LBS | HEIGHT: 68 IN | SYSTOLIC BLOOD PRESSURE: 144 MMHG | DIASTOLIC BLOOD PRESSURE: 80 MMHG | OXYGEN SATURATION: 98 % | HEART RATE: 76 BPM

## 2025-03-13 DIAGNOSIS — M75.01 ADHESIVE CAPSULITIS OF RIGHT SHOULDER: ICD-10-CM

## 2025-03-13 DIAGNOSIS — E11.3293 TYPE 2 DIABETES MELLITUS WITH BOTH EYES AFFECTED BY MILD NONPROLIFERATIVE RETINOPATHY WITHOUT MACULAR EDEMA, WITHOUT LONG-TERM CURRENT USE OF INSULIN: Chronic | ICD-10-CM

## 2025-03-13 DIAGNOSIS — R68.89 COLD INTOLERANCE: ICD-10-CM

## 2025-03-13 DIAGNOSIS — S62.101S CLOSED FRACTURE OF BOTH WRISTS, SEQUELA: ICD-10-CM

## 2025-03-13 DIAGNOSIS — I10 ESSENTIAL HYPERTENSION: Primary | Chronic | ICD-10-CM
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
## 2025-03-13 DIAGNOSIS — S02.92XS: ICD-10-CM

## 2025-03-13 DIAGNOSIS — S62.102S CLOSED FRACTURE OF BOTH WRISTS, SEQUELA: ICD-10-CM

## 2025-03-13 PROBLEM — H90.3 SENSORINEURAL HEARING LOSS (SNHL) OF BOTH EARS: Status: ACTIVE | Noted: 2025-03-13

## 2025-03-13 LAB — TSH SERPL DL<=0.05 MIU/L-ACNC: 1.97 UIU/ML (ref 0.27–4.2)

## 2025-03-13 PROCEDURE — 85025 COMPLETE CBC W/AUTO DIFF WBC: CPT

## 2025-03-13 PROCEDURE — 84443 ASSAY THYROID STIM HORMONE: CPT

## 2025-03-13 PROCEDURE — 83036 HEMOGLOBIN GLYCOSYLATED A1C: CPT

## 2025-03-13 NOTE — ASSESSMENT & PLAN NOTE
BP very elevated, improved but still abnormal on repeat; strongly advise getting home BP cuff so she can start monitoring BPs at home; reviewed how to check blood pressures correctly as well as goal <130/80; follow-up in 2 weeks if BPs remain consistently >140/90; discussed role of low-sodium as well as increased aerobic physical activity as he gets stronger with less pain from the sequela of his fall; in the meantime, continue valsartan 160 mg QD

## 2025-03-13 NOTE — ASSESSMENT & PLAN NOTE
Complaints of cold feeling in his feet and calves; normal pulses on exam today; update A1c on Jardiance 25 mg QD, metformin ER 2000 mg QD,  mariaelena 30mg QD, and Ozempic 1mg weekly

## 2025-03-13 NOTE — TELEPHONE ENCOUNTER
Pt's wife called to report that pt has an elevated BP this morning 170/98 and BP has been elevated for awhile.   Pt's wife stated that pt is not having any chest pain, headaches, dizziness, etc.   Requested specifically to see PCP as soon as possible. Same Day apt was scheduled.   Pt's wife was advised that if BP joe or if pt had any of the above symptoms to immediately go to the ED or call 911.

## 2025-03-13 NOTE — PROGRESS NOTES
Chief Complaint   Patient presents with    Hypertension       Hypertension      64 y.o.  male, accompanied by his wife, presents for follow-up on elevated blood pressures.  Does not have blood pressure cuff at home.  Feels like BPs have been elevated since fall/injury/surgery.    Was reaching for a branch and somehow fell off his truck, resulting in bilateral wrist/arm fractures as well as significant facial fractures.  No surgery on his face but has had surgery of bilateral wrists.  Is undergoing OT for his wrist and wears bilateral braces when he is out and about.  Has restarted PT for the right shoulder, which had previously resolved with PT but likely got exacerbated from this fall.  States that they will consider nasal septal surgery in the future if indicated.    Notes role of stress as well as pain.  Initially was taking lots of ibuprofen and Tylenol.  Has weaned off of oxycodone.  Taking 600 ibuprofen basically once daily with as needed Tylenol.  No new medications.    Complains of feeling cold in his feet and calf areas, both to  touch as well as his own sensation, symptoms ongoing for about a month, basically a couple weeks after his fall.  Wife states that his legs do sometimes feel cold.  No coldness in his hands or arms.    Has not taken muscle relaxant.  Wife notes that after a single dose, it seemed to exacerbate his sleep apnea with episodes of not breathing.    Review of Systems  ROS (+) for updated blood pressures as above without headache, visual changes, chest pain, palpitations, lightheadedness.  Also notes cold sensation of feet and calf areas bilaterally but no cramps, numbness/tingling, pins/needles.  No cold sensation in the hands bilaterally.  Notes tolerable pain at bilateral wrists and forearms with bilateral surgery.  Does not have a lot of pain in his face.  Has scar over the right eyebrow.  Has a dislocated finger.  All other ROS reviewed and negative.      Current Outpatient  "Medications:     aspirin 81 MG QD    Cholecalciferol (VITAMIN D3) 5000 UNITS QD    empagliflozin (Jardiance) 25 MG QD    fenofibric acid 135 MG QD    icosapent ethyl (Vascepa) 1 g 2 BID    metFORMIN  MG 24 hr 4 QD    Multiple Vitamins QD    pioglitazone 30 MG QD    rosuvastatin 20 MG QD    Semaglutide  (Ozempic, 1 MG/DOSE,) weekly    valsartan 160 MG QD    vitamin B-12 (CYANOCOBALAMIN) 500 MCG QD    methocarbamol 500 MG prn (not taking)   ibuprofen 600mg QD alt with tylenol      VITALS:  /80   Pulse 76   Ht 172.7 cm (68\")   Wt 85.3 kg (188 lb)   SpO2 98%   BMI 28.59 kg/m²   Repeat blood pressure left arm 146/78, right arm 144/80    Physical Exam  Vitals and nursing note reviewed.   Constitutional:       General: He is not in acute distress.     Appearance: Normal appearance. He is not ill-appearing.   Eyes:      Extraocular Movements: Extraocular movements intact.      Conjunctiva/sclera: Conjunctivae normal.   Cardiovascular:      Rate and Rhythm: Normal rate and regular rhythm.      Heart sounds: Normal heart sounds.      Comments: 2+ PT pulses bilaterally  Pulmonary:      Effort: Pulmonary effort is normal. No respiratory distress.      Breath sounds: No wheezing, rhonchi or rales.   Musculoskeletal:      Comments: Bilateral forearms in braces   Skin:     General: Skin is warm.      Comments: Well-healed scar over the right eyebrow    Bilateral shins warm to touch (normal)   Neurological:      Mental Status: He is alert and oriented to person, place, and time. Mental status is at baseline.      Gait: Gait normal.   Psychiatric:         Mood and Affect: Mood normal.         Behavior: Behavior normal.         LABS  Results for orders placed or performed in visit on 12/17/24   POC Glycosylated Hemoglobin (Hb A1C)    Collection Time: 12/17/24  8:49 AM    Specimen: Blood   Result Value Ref Range    Hemoglobin A1C 6.0 (A) 4.5 - 5.7 %    Lot Number [id number]     Expiration Date 08/08/2026  "       ASSESSMENT/PLAN    Diagnoses and all orders for this visit:    1. Hypertension (Primary)  Assessment & Plan:  BP very elevated, improved but still abnormal on repeat; strongly advise getting home BP cuff so she can start monitoring BPs at home; reviewed how to check blood pressures correctly as well as goal <130/80; follow-up in 2 weeks if BPs remain consistently >140/90; discussed role of low-sodium as well as increased aerobic physical activity as he gets stronger with less pain from the sequela of his fall; in the meantime, continue valsartan 160 mg QD      2. Cold intolerance  Comments:  Normal pulses and exam unremarkable; check CBC, TSH, A1c  Orders:  -     CBC & Differential; Future  -     TSH; Future  -     Hemoglobin A1c; Future    3. Type 2 diabetes mellitus with both eyes affected by mild nonproliferative retinopathy without macular edema, without long-term current use of insulin  Assessment & Plan:  Complaints of cold feeling in his feet and calves; normal pulses on exam today; update A1c on Jardiance 25 mg QD, metformin ER 2000 mg QD,  mariaelena 30mg QD, and Ozempic 1mg weekly      4. Closed fracture of both wrists, sequela  Assessment & Plan:  Ongoing OT      5. Closed fracture of facial bone due to fall, sequela  Assessment & Plan:  May need subsequent nasal fx surgery      6. Adhesive capsulitis of right shoulder  Assessment & Plan:  Possibly exacerbated by fall as well as compensatory pain from his bilateral wrist/forearm fractures; has restarted physical therapy          FOLLOW-UP  RTC  2 wks for BP check if still elevated > 140/90  Otherwise, RTC 6/19/25; fasting labs prior to appt (CBC, CMP, TSH, lipids, UA/micr, microalb/Cr, A1C, PSA, CPK, vit D, B12)    Electronically signed by:    Violette Hylton MD, FACP  03/13/2025

## 2025-03-13 NOTE — ASSESSMENT & PLAN NOTE
Possibly exacerbated by fall as well as compensatory pain from his bilateral wrist/forearm fractures; has restarted physical therapy

## 2025-03-14 ENCOUNTER — RESULTS FOLLOW-UP (OUTPATIENT)
Dept: INTERNAL MEDICINE | Facility: CLINIC | Age: 65
End: 2025-03-14
Payer: COMMERCIAL

## 2025-03-14 PROBLEM — N18.2 CKD (CHRONIC KIDNEY DISEASE) STAGE 2, GFR 60-89 ML/MIN: Chronic | Status: ACTIVE | Noted: 2023-06-08

## 2025-03-14 PROBLEM — R79.89 LOW VITAMIN B12 LEVEL: Chronic | Status: ACTIVE | Noted: 2024-06-12

## 2025-03-14 LAB
BASOPHILS # BLD AUTO: 0.06 10*3/MM3 (ref 0–0.2)
BASOPHILS NFR BLD AUTO: 0.9 % (ref 0–1.5)
DEPRECATED RDW RBC AUTO: 43.2 FL (ref 37–54)
EOSINOPHIL # BLD AUTO: 0.15 10*3/MM3 (ref 0–0.4)
EOSINOPHIL NFR BLD AUTO: 2.2 % (ref 0.3–6.2)
ERYTHROCYTE [DISTWIDTH] IN BLOOD BY AUTOMATED COUNT: 14.3 % (ref 12.3–15.4)
HBA1C MFR BLD: 6.2 % (ref 4.8–5.6)
HCT VFR BLD AUTO: 43.2 % (ref 37.5–51)
HGB BLD-MCNC: 14.8 G/DL (ref 13–17.7)
IMM GRANULOCYTES # BLD AUTO: 0.02 10*3/MM3 (ref 0–0.05)
IMM GRANULOCYTES NFR BLD AUTO: 0.3 % (ref 0–0.5)
LYMPHOCYTES # BLD AUTO: 2.46 10*3/MM3 (ref 0.7–3.1)
LYMPHOCYTES NFR BLD AUTO: 35.5 % (ref 19.6–45.3)
MCH RBC QN AUTO: 28.6 PG (ref 26.6–33)
MCHC RBC AUTO-ENTMCNC: 34.3 G/DL (ref 31.5–35.7)
MCV RBC AUTO: 83.6 FL (ref 79–97)
MONOCYTES # BLD AUTO: 0.54 10*3/MM3 (ref 0.1–0.9)
MONOCYTES NFR BLD AUTO: 7.8 % (ref 5–12)
NEUTROPHILS NFR BLD AUTO: 3.7 10*3/MM3 (ref 1.7–7)
NEUTROPHILS NFR BLD AUTO: 53.3 % (ref 42.7–76)
NRBC BLD AUTO-RTO: 0 /100 WBC (ref 0–0.2)
PLATELET # BLD AUTO: 238 10*3/MM3 (ref 140–450)
PMV BLD AUTO: 10.1 FL (ref 6–12)
RBC # BLD AUTO: 5.17 10*6/MM3 (ref 4.14–5.8)
WBC NRBC COR # BLD AUTO: 6.93 10*3/MM3 (ref 3.4–10.8)

## 2025-03-17 ENCOUNTER — TREATMENT (OUTPATIENT)
Dept: PHYSICAL THERAPY | Facility: CLINIC | Age: 65
End: 2025-03-17
Payer: COMMERCIAL

## 2025-03-17 DIAGNOSIS — M25.511 CHRONIC RIGHT SHOULDER PAIN: Primary | ICD-10-CM

## 2025-03-17 DIAGNOSIS — G89.29 CHRONIC RIGHT SHOULDER PAIN: Primary | ICD-10-CM

## 2025-03-17 NOTE — PROGRESS NOTES
Physical Therapy Daily Treatment Note    [facility]   [address], [address] [place] [address]      Patient: Bradley Ortiz   : 1960  Referring practitioner: Javier Zendejas MD  Date of Initial Visit: Type: THERAPY  Noted: 3/3/2025  Today's Date: 3/17/2025  Patient seen for 3 sessions    Certification Period 3/17/2025 thru 2025       Visit Diagnoses:    ICD-10-CM ICD-9-CM   1. Chronic right shoulder pain  M25.511 719.41    G89.29 338.29       Subjective     Pt reports he is doing well. He vacuumed over the weekend and was sore after. He feels like he only has pain at the end of his ROM.    Objective   See Exercise, Manual, and Modality Logs for complete treatment.       Assessment/Plan     Pt tolerated treatment well. He required a towel under his elbow to keep it pinned to his side with sidelying ER. He is progressing well and would benefit from continued skilled PT.     Progress per plan of care          Timed:         Manual Therapy:    10     mins  66539;     Therapeutic Exercise:    10     mins  09856;     Neuromuscular Keely:    8    mins  37594;    Therapeutic Activity:          mins  69087;     Gait Training:           mins  69508;     Ultrasound:          mins  72157;    Ionto                                   mins   79032  Self Care                            mins   60911  Canalith Repos         mins 08712  Electrical Stimulation:         mins  06225    Un-Timed:  Electrical Stimulation:         mins  13661 ( );  Dry Needling          mins self-pay  Traction          mins 10562      Timed Treatment:   28   mins   Total Treatment:     46   mins    No Blount, PT, DPT  Physical Therapist

## 2025-03-23 PROBLEM — H90.3 SENSORINEURAL HEARING LOSS (SNHL) OF BOTH EARS: Chronic | Status: ACTIVE | Noted: 2025-03-13

## 2025-03-24 NOTE — PROGRESS NOTES
"Chief Complaint   Patient presents with    Hypertension       History of Present Illness  64 y.o.  male, accompanied by his wife, presents for f/u on BP. Home BPs have been mostly 140-150s systolic, occ down to the 120s. Wife is checking BPs and brings their manual cuff today.  FBGs previously in the 120s; has not been checking lately. Last eye exam in 10/2024 at [facility].    (+) got hearing aids.    Review of Systems  Denies CP, headaches, visual changes. ROS (+) for stable pain at wrists and forearms s/p surgery. All other ROS reviewed and negative.    Current Outpatient Medications:     aspirin 81 MG QD    Cholecalciferol (VITAMIN D3) 5000 UNITS QD    empagliflozin (Jardiance) 25 MG QD    fenofibric acid 135 MG QD    icosapent ethyl (Vascepa) 1 g 2 BID    metFORMIN  MG 4 QD    methocarbamol 500 MG prn    Multiple Vitamins QD    pioglitazone 30 MG QD    rosuvastatin 20 MG QD    Semaglutide, 1 MG/DOSE, (Ozempic) weekly    valsartan 160 MG QD    vitamin B-12 (CYANOCOBALAMIN) 500 MCG QD       VITALS:  /78   Pulse 88   Ht 172.7 cm (68\")   Wt 86.1 kg (189 lb 12.8 oz)   SpO2 97%   BMI 28.86 kg/m²   Repeat BP his  machine left arm 124/66, manual left arm 122/64    Physical Exam  Vitals and nursing note reviewed.   Constitutional:       General: He is not in acute distress.     Appearance: Normal appearance. He is not ill-appearing.   Eyes:      Extraocular Movements: Extraocular movements intact.      Conjunctiva/sclera: Conjunctivae normal.   Pulmonary:      Effort: Pulmonary effort is normal. No respiratory distress.   Neurological:      Mental Status: He is alert. Mental status is at baseline.   Psychiatric:         Mood and Affect: Mood normal.         Behavior: Behavior normal.         LABS  Results for orders placed or performed in visit on 03/13/25   TSH    Collection Time: 03/13/25  1:05 PM    Specimen: Blood   Result Value Ref Range    TSH 1.970 0.270 - 4.200 uIU/mL   Hemoglobin A1c    " Collection Time: 03/13/25  1:05 PM    Specimen: Blood   Result Value Ref Range    Hemoglobin A1C 6.20 (H) 4.80 - 5.60 %   CBC Auto Differential    Collection Time: 03/13/25  1:05 PM    Specimen: Blood   Result Value Ref Range    WBC 6.93 3.40 - 10.80 10*3/mm3    RBC 5.17 4.14 - 5.80 10*6/mm3    Hemoglobin 14.8 13.0 - 17.7 g/dL    Hematocrit 43.2 37.5 - 51.0 %    MCV 83.6 79.0 - 97.0 fL    MCH 28.6 26.6 - 33.0 pg    MCHC 34.3 31.5 - 35.7 g/dL    RDW 14.3 12.3 - 15.4 %    RDW-SD 43.2 37.0 - 54.0 fl    MPV 10.1 6.0 - 12.0 fL    Platelets 238 140 - 450 10*3/mm3    Neutrophil % 53.3 42.7 - 76.0 %    Lymphocyte % 35.5 19.6 - 45.3 %    Monocyte % 7.8 5.0 - 12.0 %    Eosinophil % 2.2 0.3 - 6.2 %    Basophil % 0.9 0.0 - 1.5 %    Immature Grans % 0.3 0.0 - 0.5 %    Neutrophils, Absolute 3.70 1.70 - 7.00 10*3/mm3    Lymphocytes, Absolute 2.46 0.70 - 3.10 10*3/mm3    Monocytes, Absolute 0.54 0.10 - 0.90 10*3/mm3    Eosinophils, Absolute 0.15 0.00 - 0.40 10*3/mm3    Basophils, Absolute 0.06 0.00 - 0.20 10*3/mm3    Immature Grans, Absolute 0.02 0.00 - 0.05 10*3/mm3    nRBC 0.0 0.0 - 0.2 /100 WBC       ASSESSMENT/PLAN    Diagnoses and all orders for this visit:    1. Hypertension (Primary)  Assessment & Plan:  BP normotensive int he office today but home BP readings are mildly elevated; verified BP machine (cuff) today; cont valsartan 160mg QD and cont home BP monitoring with goal < 130/80; f/u in 3 mos      2. Type 2 diabetes mellitus with both eyes affected by mild nonproliferative retinopathy without macular edema, without long-term current use of insulin  Assessment & Plan:  Recent A1C stable at 6.2; cont Jardiance 25 mg QD, metformin ER 2000 mg QD, mariaelena 30mg QD, and Ozempic 1mg weekly; rec checking BGs at home fasting and 2h pp; f/u A1C in 3 mos      3. Sensorineural hearing loss (SNHL) of both ears  Assessment & Plan:  Commended patient on getting hearing aids; reviewed link between hearing loss and  dementia          FOLLOW-UP  RTC for PE 6/19/25; fasting labs prior to appt (CBC, CMP, TSH, lipids, UA/micr, microalb/Cr, A1C, PSA, CPK, vit D, B12) and BP check    Electronically signed by:    Violette Hylton MD, FACP  03/28/2025

## 2025-03-24 NOTE — ASSESSMENT & PLAN NOTE
Recent A1C stable at 6.2; cont Jardiance 25 mg QD, metformin ER 2000 mg QD, mariaelena 30mg QD, and Ozempic 1mg weekly; rec checking BGs at home fasting and 2h pp; f/u A1C in 3 mos

## 2025-03-24 NOTE — ASSESSMENT & PLAN NOTE
BP normotensive int he office today but home BP readings are mildly elevated; verified BP machine (cuff) today; cont valsartan 160mg QD and cont home BP monitoring with goal < 130/80; f/u in 3 mos

## 2025-03-25 ENCOUNTER — TREATMENT (OUTPATIENT)
Dept: PHYSICAL THERAPY | Facility: CLINIC | Age: 65
End: 2025-03-25
Payer: COMMERCIAL

## 2025-03-25 DIAGNOSIS — G89.29 CHRONIC RIGHT SHOULDER PAIN: Primary | ICD-10-CM

## 2025-03-25 DIAGNOSIS — M25.511 CHRONIC RIGHT SHOULDER PAIN: Primary | ICD-10-CM

## 2025-03-25 PROCEDURE — 97112 NEUROMUSCULAR REEDUCATION: CPT | Performed by: PHYSICAL THERAPIST

## 2025-03-25 PROCEDURE — 97140 MANUAL THERAPY 1/> REGIONS: CPT | Performed by: PHYSICAL THERAPIST

## 2025-03-25 PROCEDURE — 97110 THERAPEUTIC EXERCISES: CPT | Performed by: PHYSICAL THERAPIST

## 2025-03-25 NOTE — PROGRESS NOTES
Physical Therapy Daily Treatment Note    [facility]   [address], [address] [place] [address]      Patient: Bradley Ortiz   : 1960  Referring practitioner: Javier Zendejas MD  Date of Initial Visit: Type: THERAPY  Noted: 3/3/2025  Today's Date: 3/25/2025  Patient seen for 4 sessions    Certification Period 3/25/2025 thru 2025       Visit Diagnoses:  No diagnosis found.    Subjective     Pt reports he reached for something a couple of days ago and over stretched his shoulder. Since then, he has been in more pain.    Objective   See Exercise, Manual, and Modality Logs for complete treatment.       Assessment/Plan     Pt tolerated treatment well. His mobility is improving steadily. He has mild pain with strengthening activities. Pt would benefit from continued skilled PT.     Progress per plan of care          Timed:         Manual Therapy:    14     mins  43136;     Therapeutic Exercise:    23     mins  92563;     Neuromuscular Keely:    10    mins  47810;    Therapeutic Activity:          mins  92350;     Gait Training:           mins  35808;     Ultrasound:          mins  61574;    Ionto                                   mins   86315  Self Care                            mins   25634  Canalith Repos         mins 65068  Electrical Stimulation:         mins  86529    Un-Timed:  Electrical Stimulation:         mins  00164 (MC );  Dry Needling          mins self-pay  Traction          mins 69468      Timed Treatment:   47   mins   Total Treatment:     47   mins    No Blount, PT, DPT  Physical Therapist

## 2025-03-28 ENCOUNTER — OFFICE VISIT (OUTPATIENT)
Dept: INTERNAL MEDICINE | Facility: CLINIC | Age: 65
End: 2025-03-28
Payer: COMMERCIAL

## 2025-03-28 ENCOUNTER — TELEPHONE (OUTPATIENT)
Dept: INTERNAL MEDICINE | Facility: CLINIC | Age: 65
End: 2025-03-28

## 2025-03-28 VITALS
SYSTOLIC BLOOD PRESSURE: 128 MMHG | HEART RATE: 88 BPM | DIASTOLIC BLOOD PRESSURE: 78 MMHG | BODY MASS INDEX: 28.76 KG/M2 | WEIGHT: 189.8 LBS | OXYGEN SATURATION: 97 % | HEIGHT: 68 IN

## 2025-03-28 DIAGNOSIS — E11.3293 TYPE 2 DIABETES MELLITUS WITH BOTH EYES AFFECTED BY MILD NONPROLIFERATIVE RETINOPATHY WITHOUT MACULAR EDEMA, WITHOUT LONG-TERM CURRENT USE OF INSULIN: Chronic | ICD-10-CM

## 2025-03-28 DIAGNOSIS — I10 ESSENTIAL HYPERTENSION: Primary | Chronic | ICD-10-CM

## 2025-03-28 DIAGNOSIS — H90.3 SENSORINEURAL HEARING LOSS (SNHL) OF BOTH EARS: Chronic | ICD-10-CM

## 2025-03-28 PROCEDURE — 99214 OFFICE O/P EST MOD 30 MIN: CPT | Performed by: INTERNAL MEDICINE

## 2025-03-28 NOTE — TELEPHONE ENCOUNTER
Spoke with [facility] and they will be faxing the records    ----- Message from Violette Hylton sent at 3/28/2025  8:57 AM EDT -----  Pls request last eye exam - [facility] Oct/Nov 2024 per pt

## 2025-03-31 ENCOUNTER — TREATMENT (OUTPATIENT)
Dept: PHYSICAL THERAPY | Facility: CLINIC | Age: 65
End: 2025-03-31
Payer: COMMERCIAL

## 2025-03-31 DIAGNOSIS — G89.29 CHRONIC RIGHT SHOULDER PAIN: Primary | ICD-10-CM

## 2025-03-31 DIAGNOSIS — M25.511 CHRONIC RIGHT SHOULDER PAIN: Primary | ICD-10-CM

## 2025-03-31 PROCEDURE — 97110 THERAPEUTIC EXERCISES: CPT | Performed by: PHYSICAL THERAPIST

## 2025-03-31 PROCEDURE — 97140 MANUAL THERAPY 1/> REGIONS: CPT | Performed by: PHYSICAL THERAPIST

## 2025-03-31 PROCEDURE — 97112 NEUROMUSCULAR REEDUCATION: CPT | Performed by: PHYSICAL THERAPIST

## 2025-03-31 NOTE — PROGRESS NOTES
Physical Therapy Daily Treatment Note    [facility]   [address], [address] [place] [address]      Patient: Bradley Ortiz   : 1960  Referring practitioner: Javier Zendejas MD  Date of Initial Visit: Type: THERAPY  Noted: 3/3/2025  Today's Date: 3/31/2025  Patient seen for 5 sessions    Certification Period 3/31/2025 thru 2025       Visit Diagnoses:  No diagnosis found.    Subjective     Pt reports he always feels better after PT. He feels like his overall improvement is about 25%. He continues to have the most difficulty with reaching behind his back.    Objective          Active Range of Motion     Right Shoulder   Flexion: 170 degrees   Abduction: 145 degrees     Additional Active Range of Motion Details  Apleys ER: T2  Apleys IR: sacrum      See Exercise, Manual, and Modality Logs for complete treatment.       Assessment/Plan     Pt tolerated treatment well. His AROM has improved in every direction besides internal rotation. He was educated on a stretch to perform at home to assist with this. Pt would benefit from continued skilled PT.      Progress per plan of care          Timed:         Manual Therapy:    14     mins  69601;     Therapeutic Exercise:    23     mins  03208;     Neuromuscular Keely:    10    mins  17188;    Therapeutic Activity:          mins  61622;     Gait Training:           mins  48318;     Ultrasound:          mins  82100;    Ionto                                   mins   60618  Self Care                            mins   47080  Canalith Repos         mins 42951  Electrical Stimulation:         mins  71597    Un-Timed:  Electrical Stimulation:         mins  91787 (MC );  Dry Needling          mins self-pay  Traction          mins 28559      Timed Treatment:   47   mins   Total Treatment:     47   mins    No Blount, PT, DPT  Physical Therapist

## 2025-04-09 ENCOUNTER — TREATMENT (OUTPATIENT)
Dept: PHYSICAL THERAPY | Facility: CLINIC | Age: 65
End: 2025-04-09
Payer: COMMERCIAL

## 2025-04-09 DIAGNOSIS — M25.511 CHRONIC RIGHT SHOULDER PAIN: Primary | ICD-10-CM

## 2025-04-09 DIAGNOSIS — G89.29 CHRONIC RIGHT SHOULDER PAIN: Primary | ICD-10-CM

## 2025-04-09 NOTE — PROGRESS NOTES
Physical Therapy Daily Treatment Note    [facility]   [address], [address] [place] [address]      Patient: Bradley Ortiz   : 1960  Referring practitioner: Javier Zendejas MD  Date of Initial Visit: Type: THERAPY  Noted: 3/3/2025  Today's Date: 2025  Patient seen for 6 sessions    Certification Period 2025 thru 2025       Visit Diagnoses:    ICD-10-CM ICD-9-CM   1. Chronic right shoulder pain  M25.511 719.41    G89.29 338.29       Subjective     Pt reports he is doing okay but the pain is still present at end range. He has the most difficulty with reaching behind his back.    Objective   See Exercise, Manual, and Modality Logs for complete treatment.       Assessment/Plan     Pt tolerated treatment well. Initiated more resistance activities without complaints of pain.  Pt would benefit from continued skilled PT.    Progress per plan of care          Timed:         Manual Therapy:    15     mins  15462;     Therapeutic Exercise:    23     mins  38363;     Neuromuscular Keely:    10    mins  28126;    Therapeutic Activity:          mins  29678;     Gait Training:           mins  70279;     Ultrasound:          mins  83128;    Ionto                                   mins   76714  Self Care                            mins   14594  Canalith Repos         mins 63630  Electrical Stimulation:         mins  65609    Un-Timed:  Electrical Stimulation:         mins  59410 ( );  Dry Needling          mins self-pay  Traction          mins 27569      Timed Treatment:   48   mins   Total Treatment:     48   mins    No Blount, PT, DPT  Physical Therapist

## 2025-04-14 ENCOUNTER — TREATMENT (OUTPATIENT)
Dept: PHYSICAL THERAPY | Facility: CLINIC | Age: 65
End: 2025-04-14
Payer: COMMERCIAL

## 2025-04-14 DIAGNOSIS — G89.29 CHRONIC RIGHT SHOULDER PAIN: Primary | ICD-10-CM

## 2025-04-14 DIAGNOSIS — M25.511 CHRONIC RIGHT SHOULDER PAIN: Primary | ICD-10-CM

## 2025-04-14 PROCEDURE — 97140 MANUAL THERAPY 1/> REGIONS: CPT | Performed by: PHYSICAL THERAPIST

## 2025-04-14 PROCEDURE — 97110 THERAPEUTIC EXERCISES: CPT | Performed by: PHYSICAL THERAPIST

## 2025-04-14 PROCEDURE — 97112 NEUROMUSCULAR REEDUCATION: CPT | Performed by: PHYSICAL THERAPIST

## 2025-04-14 NOTE — PROGRESS NOTES
Physical Therapy Daily Treatment Note    [facility]   [address], [address] [place] [address]      Patient: Bradley Ortiz   : 1960  Referring practitioner: Javier Zendejas MD  Date of Initial Visit: Type: THERAPY  Noted: 3/3/2025  Today's Date: 2025  Patient seen for 7 sessions    Certification Period 2025 thru 2025       Visit Diagnoses:  No diagnosis found.    Subjective     Pt reports he is doing better overall.     Objective   See Exercise, Manual, and Modality Logs for complete treatment.       Assessment/Plan     Pt tolerated treatment well. Initiated 90/90 IR without complaints of pain. He requires cues to not elevate his shoulder with modified sleeper stretch. Pt would benefit from continued skilled PT.     Progress per plan of care          Timed:         Manual Therapy:    14     mins  70830;     Therapeutic Exercise:    23     mins  33556;     Neuromuscular Keely:    10    mins  83432;    Therapeutic Activity:          mins  27858;     Gait Training:           mins  80515;     Ultrasound:          mins  12514;    Ionto                                   mins   20979  Self Care                            mins   45911  Canalith Repos         mins 67930  Electrical Stimulation:         mins  70877    Un-Timed:  Electrical Stimulation:         mins  65057 ( );  Dry Needling          mins self-pay  Traction          mins 81171      Timed Treatment:   47   mins   Total Treatment:     47   mins    No Blount, PT, DPT  Physical Therapist

## 2025-04-21 ENCOUNTER — TREATMENT (OUTPATIENT)
Dept: PHYSICAL THERAPY | Facility: CLINIC | Age: 65
End: 2025-04-21
Payer: COMMERCIAL

## 2025-04-21 DIAGNOSIS — M25.511 CHRONIC RIGHT SHOULDER PAIN: Primary | ICD-10-CM

## 2025-04-21 DIAGNOSIS — G89.29 CHRONIC RIGHT SHOULDER PAIN: Primary | ICD-10-CM

## 2025-04-21 PROCEDURE — 97110 THERAPEUTIC EXERCISES: CPT | Performed by: PHYSICAL THERAPIST

## 2025-04-21 PROCEDURE — 97112 NEUROMUSCULAR REEDUCATION: CPT | Performed by: PHYSICAL THERAPIST

## 2025-04-21 PROCEDURE — 97140 MANUAL THERAPY 1/> REGIONS: CPT | Performed by: PHYSICAL THERAPIST

## 2025-04-21 NOTE — PROGRESS NOTES
Physical Therapy Daily Treatment Note    [facility]   [address], [address] [place] [address]      Patient: Bradley Ortiz   : 1960  Referring practitioner: Javier Zendejas MD  Date of Initial Visit: Type: THERAPY  Noted: 3/3/2025  Today's Date: 2025  Patient seen for 8 sessions    Certification Period 2025 thru 2025       Visit Diagnoses:    ICD-10-CM ICD-9-CM   1. Chronic right shoulder pain  M25.511 719.41    G89.29 338.29       Subjective     Pt reports he feels like he is doing better. He feels like he is about as improved as he was last time he was discharged from PT.    Objective   See Exercise, Manual, and Modality Logs for complete treatment.       Assessment/Plan     Pt tolerated treatment well. His AROM is WNL. He is independent with his exercises. Discussed that he will return in 2 weeks. If he is doing well at that point, he will be discharged.     Progress per plan of care          Timed:         Manual Therapy:    10     mins  69498;     Therapeutic Exercise:    10     mins  11571;     Neuromuscular Keely:    10    mins  85970;    Therapeutic Activity:          mins  12019;     Gait Training:           mins  37580;     Ultrasound:          mins  49041;    Ionto                                   mins   60736  Self Care                            mins   96787  Canalith Repos         mins 09854  Electrical Stimulation:         mins  04924    Un-Timed:  Electrical Stimulation:         mins  39173 ( );  Dry Needling          mins self-pay  Traction          mins 63535      Timed Treatment:   30   mins   Total Treatment:     45   mins    No Blount, PT, DPT  Physical Therapist

## 2025-05-05 ENCOUNTER — TREATMENT (OUTPATIENT)
Dept: PHYSICAL THERAPY | Facility: CLINIC | Age: 65
End: 2025-05-05
Payer: COMMERCIAL

## 2025-05-05 DIAGNOSIS — G89.29 CHRONIC RIGHT SHOULDER PAIN: Primary | ICD-10-CM

## 2025-05-05 DIAGNOSIS — M25.511 CHRONIC RIGHT SHOULDER PAIN: Primary | ICD-10-CM

## 2025-05-05 PROCEDURE — 97110 THERAPEUTIC EXERCISES: CPT | Performed by: PHYSICAL THERAPIST

## 2025-05-05 PROCEDURE — 97112 NEUROMUSCULAR REEDUCATION: CPT | Performed by: PHYSICAL THERAPIST

## 2025-05-05 PROCEDURE — 97140 MANUAL THERAPY 1/> REGIONS: CPT | Performed by: PHYSICAL THERAPIST

## 2025-05-05 NOTE — PROGRESS NOTES
Physical Therapy Daily Treatment Note    [facility]   [address], [address] [place] [address]      Patient: Bradley Ortiz   : 1960  Referring practitioner: Javier Zendejas MD  Date of Initial Visit: Type: THERAPY  Noted: 3/3/2025  Today's Date: 2025  Patient seen for 9 sessions    Certification Period 2025 thru 2025       Visit Diagnoses:    ICD-10-CM ICD-9-CM   1. Chronic right shoulder pain  M25.511 719.41    G89.29 338.29       Subjective     Pt reports he feels his pain is manageable at this point. He has not had any increase in pain over the last 2 weeks.    Objective   See Exercise, Manual, and Modality Logs for complete treatment.       Assessment/Plan     Pt tolerated treatment well. At this point, he has reached a plateau with therapy. Discussed that he should continue his HEP and return if needed. At this time, he is safe to be discharged.     DC          Timed:         Manual Therapy:    8     mins  29017;     Therapeutic Exercise:    23     mins  96643;     Neuromuscular Keely:    15    mins  64059;    Therapeutic Activity:          mins  66250;     Gait Training:           mins  45650;     Ultrasound:          mins  78384;    Ionto                                   mins   90536  Self Care                            mins   77944  Canalith Repos         mins 93124  Electrical Stimulation:         mins  58290    Un-Timed:  Electrical Stimulation:         mins  89617 ( );  Dry Needling          mins self-pay  Traction          mins 25695      Timed Treatment:   46   mins   Total Treatment:     46   mins    No Blount, PT, DPT  Physical Therapist

## 2025-05-09 DIAGNOSIS — E11.3293 TYPE 2 DIABETES MELLITUS WITH BOTH EYES AFFECTED BY MILD NONPROLIFERATIVE RETINOPATHY WITHOUT MACULAR EDEMA, WITHOUT LONG-TERM CURRENT USE OF INSULIN: Chronic | ICD-10-CM

## 2025-05-09 RX ORDER — SEMAGLUTIDE 1.34 MG/ML
1 INJECTION, SOLUTION SUBCUTANEOUS WEEKLY
Qty: 9 ML | Refills: 0 | OUTPATIENT
Start: 2025-05-09

## 2025-05-29 DIAGNOSIS — E55.9 VITAMIN D DEFICIENCY: Chronic | ICD-10-CM

## 2025-05-29 DIAGNOSIS — R79.89 LOW VITAMIN B12 LEVEL: Chronic | ICD-10-CM

## 2025-05-29 DIAGNOSIS — Z00.00 PE (PHYSICAL EXAM), ROUTINE: Primary | Chronic | ICD-10-CM

## 2025-05-29 DIAGNOSIS — Z12.5 SCREENING FOR PROSTATE CANCER: ICD-10-CM

## 2025-05-29 DIAGNOSIS — E78.2 MIXED HYPERLIPIDEMIA: Chronic | ICD-10-CM

## 2025-05-29 DIAGNOSIS — E11.3293 TYPE 2 DIABETES MELLITUS WITH BOTH EYES AFFECTED BY MILD NONPROLIFERATIVE RETINOPATHY WITHOUT MACULAR EDEMA, WITHOUT LONG-TERM CURRENT USE OF INSULIN: Chronic | ICD-10-CM

## 2025-06-01 NOTE — PROGRESS NOTES
Chief Complaint   Patient presents with    Right Knee Pain       History of Present Illness  64 y.o.  male presents for further evaluation of R knee pain. HPI started with R knee pain at least for 1 yr but reports increased pain over the last few weeks. Is particularly symptomatic when kneeling. Has to stretch and loosen up his R knee before he can walk after kneeling position. Notes job requiring increased kneeling/squatting last week and this cause prolonged pain for several days. Has not really takn medication for this pain, only occ 3 advil and is unsure of efficacy. After he walks a bit, the pain goes away. He is not symptomatic with walking in general and generally not symptomatic with going up/down stairs. Has not fallen or had other known injury.     Still recovering from wrist surgery. Still has residual numbness/tingling at some of the finger tips and deformity with decreased flexion of L index finger. Also ongoing therapy for shoulders, improving but still with decreased ROM, noting decreased ability to put R arm behind his back.     Inquiring about checking testosterone level - concerned with fatigue.     Review of Systems  ROS (+) for worsened R knee pain mainly with flexion, ie kneeling, as noted. Denies falls/injuries. Denies numbness/tingling in legs. ROS (+) for scattered numbness/tingling fingertips, decreased ROM L index finger with some discomfort, and cont'd decreased ROM and soreness at the shoulders bilaterally. All other ROS reviewed and negative.    Current Outpatient Medications:     aspirin 81 MG QD    Cholecalciferol (VITAMIN D3) 5000 UNITS QD    empagliflozin (Jardiance) 25 MG QD    fenofibric acid 135 MG QD    icosapent ethyl (Vascepa) 1 g 2 BID    metFORMIN  MG 4 qd    methocarbamol 500 MG prn    Multiple Vitamins QD    pioglitazone 30 MG QD    rosuvastatin 20 MG QD    Semaglutide, 1 MG/DOSE, (Ozempic) weekly    valsartan 160 MG QD    vitamin B-12 (CYANOCOBALAMIN) 500  "MCG QD    VITALS:  /76   Pulse 75   Ht 172.7 cm (68\")   Wt 85.8 kg (189 lb 3.2 oz)   SpO2 98%   BMI 28.77 kg/m²     Physical Exam  Vitals and nursing note reviewed.   Constitutional:       General: He is not in acute distress.     Appearance: Normal appearance. He is not ill-appearing.   Eyes:      Extraocular Movements: Extraocular movements intact.      Conjunctiva/sclera: Conjunctivae normal.   Pulmonary:      Effort: Pulmonary effort is normal. No respiratory distress.   Musculoskeletal:         General: Deformity (swelling and deviation of L index finger; decreased ROM shoulders R>L) present.      Comments: Crepitus bilat knees with FROM; mild \"inside\" R knee discomfort with extreme flexion, minimally with palpation of popliteal fossa - no masses palpated; flexion intact; mild tenderness also with inversion, asx with eversion of R knee; mild swelling medial R knee without erythema or increased warmth; L knee asx/FROM   Neurological:      Mental Status: He is alert and oriented to person, place, and time. Mental status is at baseline.   Psychiatric:         Mood and Affect: Mood normal.         Behavior: Behavior normal.         LABS  Results for orders placed or performed in visit on 03/13/25   TSH    Collection Time: 03/13/25  1:05 PM    Specimen: Blood   Result Value Ref Range    TSH 1.970 0.270 - 4.200 uIU/mL   Hemoglobin A1c    Collection Time: 03/13/25  1:05 PM    Specimen: Blood   Result Value Ref Range    Hemoglobin A1C 6.20 (H) 4.80 - 5.60 %   CBC Auto Differential    Collection Time: 03/13/25  1:05 PM    Specimen: Blood   Result Value Ref Range    WBC 6.93 3.40 - 10.80 10*3/mm3    RBC 5.17 4.14 - 5.80 10*6/mm3    Hemoglobin 14.8 13.0 - 17.7 g/dL    Hematocrit 43.2 37.5 - 51.0 %    MCV 83.6 79.0 - 97.0 fL    MCH 28.6 26.6 - 33.0 pg    MCHC 34.3 31.5 - 35.7 g/dL    RDW 14.3 12.3 - 15.4 %    RDW-SD 43.2 37.0 - 54.0 fl    MPV 10.1 6.0 - 12.0 fL    Platelets 238 140 - 450 10*3/mm3    Neutrophil % " 53.3 42.7 - 76.0 %    Lymphocyte % 35.5 19.6 - 45.3 %    Monocyte % 7.8 5.0 - 12.0 %    Eosinophil % 2.2 0.3 - 6.2 %    Basophil % 0.9 0.0 - 1.5 %    Immature Grans % 0.3 0.0 - 0.5 %    Neutrophils, Absolute 3.70 1.70 - 7.00 10*3/mm3    Lymphocytes, Absolute 2.46 0.70 - 3.10 10*3/mm3    Monocytes, Absolute 0.54 0.10 - 0.90 10*3/mm3    Eosinophils, Absolute 0.15 0.00 - 0.40 10*3/mm3    Basophils, Absolute 0.06 0.00 - 0.20 10*3/mm3    Immature Grans, Absolute 0.02 0.00 - 0.05 10*3/mm3    nRBC 0.0 0.0 - 0.2 /100 WBC     ASSESSMENT/PLAN    Diagnoses and all orders for this visit:    1. Acute pain of right knee (Primary)  Comments:  acute on chronic; (+) sx w/ extreme flexion and kneeling; check Xray, proceed with PT ([facility] per pt), and rec topical creams/ointment  Orders:  -     XR Knee 1 or 2 View Right; Future  -     Ambulatory Referral to Physical Therapy for Evaluation & Treatment    2. Hypertension  Assessment & Plan:  BP stable 120/76; cont valsartan 160mg QD      3. Fatigue, unspecified type  Comments:  discussed implications of testosterone level, incl risks/benefits of RX; PE labs pending as well; reviewed role of stress d/t trauma w/ his fall and injuries  Orders:  -     Testosterone; Future        FOLLOW-UP  RTC for PE 6/19/25; fasting labs prior to appt (CBC, CMP, TSH, lipids, UA/micr, microalb/Cr, A1C, PSA, CPK, vit D, B12, testosterone)    Electronically signed by:    Violette Hylton MD, FACP  06/02/2025

## 2025-06-02 ENCOUNTER — OFFICE VISIT (OUTPATIENT)
Dept: INTERNAL MEDICINE | Facility: CLINIC | Age: 65
End: 2025-06-02
Payer: COMMERCIAL

## 2025-06-02 VITALS
SYSTOLIC BLOOD PRESSURE: 120 MMHG | BODY MASS INDEX: 28.67 KG/M2 | OXYGEN SATURATION: 98 % | HEIGHT: 68 IN | DIASTOLIC BLOOD PRESSURE: 76 MMHG | HEART RATE: 75 BPM | WEIGHT: 189.2 LBS

## 2025-06-02 DIAGNOSIS — I10 ESSENTIAL HYPERTENSION: Chronic | ICD-10-CM

## 2025-06-02 DIAGNOSIS — M25.561 ACUTE PAIN OF RIGHT KNEE: Primary | ICD-10-CM

## 2025-06-02 DIAGNOSIS — R53.83 FATIGUE, UNSPECIFIED TYPE: ICD-10-CM

## 2025-06-02 PROCEDURE — 99214 OFFICE O/P EST MOD 30 MIN: CPT | Performed by: INTERNAL MEDICINE

## 2025-06-05 ENCOUNTER — HOSPITAL ENCOUNTER (OUTPATIENT)
Dept: GENERAL RADIOLOGY | Facility: HOSPITAL | Age: 65
Discharge: HOME | End: 2025-06-05
Admitting: INTERNAL MEDICINE
Payer: COMMERCIAL

## 2025-06-05 DIAGNOSIS — E11.3293 TYPE 2 DIABETES MELLITUS WITH BOTH EYES AFFECTED BY MILD NONPROLIFERATIVE RETINOPATHY WITHOUT MACULAR EDEMA, WITHOUT LONG-TERM CURRENT USE OF INSULIN: Chronic | ICD-10-CM

## 2025-06-05 DIAGNOSIS — E78.2 MIXED HYPERLIPIDEMIA: Chronic | ICD-10-CM

## 2025-06-05 DIAGNOSIS — M25.561 ACUTE PAIN OF RIGHT KNEE: ICD-10-CM

## 2025-06-05 DIAGNOSIS — E78.1 HYPERTRIGLYCERIDEMIA: Chronic | ICD-10-CM

## 2025-06-05 PROCEDURE — 73560 X-RAY EXAM OF KNEE 1 OR 2: CPT

## 2025-06-05 RX ORDER — ROSUVASTATIN CALCIUM 20 MG/1
20 TABLET, COATED ORAL DAILY
Qty: 90 TABLET | Refills: 0 | OUTPATIENT
Start: 2025-06-05

## 2025-06-05 RX ORDER — METFORMIN HYDROCHLORIDE 500 MG/1
TABLET, EXTENDED RELEASE ORAL
Qty: 360 TABLET | Refills: 0 | OUTPATIENT
Start: 2025-06-05

## 2025-06-05 RX ORDER — EMPAGLIFLOZIN 25 MG/1
25 TABLET, FILM COATED ORAL DAILY
Qty: 90 TABLET | Refills: 0 | OUTPATIENT
Start: 2025-06-05

## 2025-06-05 RX ORDER — FENOFIBRIC ACID 135 MG/1
135 CAPSULE, DELAYED RELEASE ORAL DAILY
Qty: 90 CAPSULE | Refills: 0 | OUTPATIENT
Start: 2025-06-05

## 2025-06-05 NOTE — TELEPHONE ENCOUNTER
[facility] for patient to return call, he will run out on 6/17, Dr Hylton filled these for a year and should last until 6/17 but his appointment is not until 6/19, does he have enough until then or does he 2 pills of each medication sent in to the pharmacy

## 2025-06-06 ENCOUNTER — RESULTS FOLLOW-UP (OUTPATIENT)
Dept: INTERNAL MEDICINE | Facility: CLINIC | Age: 65
End: 2025-06-06
Payer: COMMERCIAL

## 2025-06-06 PROBLEM — M17.11 PRIMARY OSTEOARTHRITIS OF RIGHT KNEE: Status: ACTIVE | Noted: 2025-06-06

## 2025-06-06 NOTE — TELEPHONE ENCOUNTER
Called and spoke to pt. Gave message from provider on Xray. Pt voiced understanding and appreciation.

## 2025-06-09 DIAGNOSIS — E11.3293 TYPE 2 DIABETES MELLITUS WITH BOTH EYES AFFECTED BY MILD NONPROLIFERATIVE RETINOPATHY WITHOUT MACULAR EDEMA, WITHOUT LONG-TERM CURRENT USE OF INSULIN: Chronic | ICD-10-CM

## 2025-06-09 RX ORDER — SEMAGLUTIDE 1.34 MG/ML
1 INJECTION, SOLUTION SUBCUTANEOUS WEEKLY
Qty: 2 ML | Refills: 0 | Status: SHIPPED | OUTPATIENT
Start: 2025-06-09

## 2025-06-12 ENCOUNTER — LAB (OUTPATIENT)
Dept: LAB | Facility: HOSPITAL | Age: 65
End: 2025-06-12
Payer: COMMERCIAL

## 2025-06-12 DIAGNOSIS — Z12.5 SCREENING FOR PROSTATE CANCER: ICD-10-CM

## 2025-06-12 DIAGNOSIS — E11.3293 TYPE 2 DIABETES MELLITUS WITH BOTH EYES AFFECTED BY MILD NONPROLIFERATIVE RETINOPATHY WITHOUT MACULAR EDEMA, WITHOUT LONG-TERM CURRENT USE OF INSULIN: Chronic | ICD-10-CM

## 2025-06-12 DIAGNOSIS — E55.9 VITAMIN D DEFICIENCY: Chronic | ICD-10-CM

## 2025-06-12 DIAGNOSIS — R79.89 LOW VITAMIN B12 LEVEL: Chronic | ICD-10-CM

## 2025-06-12 DIAGNOSIS — Z00.00 PE (PHYSICAL EXAM), ROUTINE: ICD-10-CM

## 2025-06-12 DIAGNOSIS — R53.83 FATIGUE, UNSPECIFIED TYPE: ICD-10-CM

## 2025-06-12 DIAGNOSIS — E78.2 MIXED HYPERLIPIDEMIA: Chronic | ICD-10-CM

## 2025-06-12 LAB
25(OH)D3 SERPL-MCNC: 58.1 NG/ML (ref 30–100)
ALBUMIN SERPL-MCNC: 4.7 G/DL (ref 3.5–5.2)
ALBUMIN UR-MCNC: <1.2 MG/DL
ALBUMIN/GLOB SERPL: 1.9 G/DL
ALP SERPL-CCNC: 28 U/L (ref 39–117)
ALT SERPL W P-5'-P-CCNC: 17 U/L (ref 1–41)
ANION GAP SERPL CALCULATED.3IONS-SCNC: 14 MMOL/L (ref 5–15)
AST SERPL-CCNC: 26 U/L (ref 1–40)
BACTERIA UR QL AUTO: NORMAL /HPF
BASOPHILS # BLD AUTO: 0.04 10*3/MM3 (ref 0–0.2)
BASOPHILS NFR BLD AUTO: 0.9 % (ref 0–1.5)
BILIRUB SERPL-MCNC: 0.4 MG/DL (ref 0–1.2)
BILIRUB UR QL STRIP: NEGATIVE
BUN SERPL-MCNC: 18 MG/DL (ref 8–23)
BUN/CREAT SERPL: 16.7 (ref 7–25)
CALCIUM SPEC-SCNC: 10.3 MG/DL (ref 8.6–10.5)
CHLORIDE SERPL-SCNC: 104 MMOL/L (ref 98–107)
CHOLEST SERPL-MCNC: 134 MG/DL (ref 0–200)
CK SERPL-CCNC: 223 U/L (ref 20–200)
CLARITY UR: CLEAR
CO2 SERPL-SCNC: 21 MMOL/L (ref 22–29)
COLOR UR: YELLOW
CREAT SERPL-MCNC: 1.08 MG/DL (ref 0.76–1.27)
CREAT UR-MCNC: 90.2 MG/DL
DEPRECATED RDW RBC AUTO: 44.5 FL (ref 37–54)
EGFRCR SERPLBLD CKD-EPI 2021: 76.6 ML/MIN/1.73
EOSINOPHIL # BLD AUTO: 0.14 10*3/MM3 (ref 0–0.4)
EOSINOPHIL NFR BLD AUTO: 3.1 % (ref 0.3–6.2)
ERYTHROCYTE [DISTWIDTH] IN BLOOD BY AUTOMATED COUNT: 13.9 % (ref 12.3–15.4)
GLOBULIN UR ELPH-MCNC: 2.5 GM/DL
GLUCOSE SERPL-MCNC: 101 MG/DL (ref 65–99)
GLUCOSE UR STRIP-MCNC: ABNORMAL MG/DL
HBA1C MFR BLD: 6.1 % (ref 4.8–5.6)
HCT VFR BLD AUTO: 46.4 % (ref 37.5–51)
HDLC SERPL-MCNC: 46 MG/DL (ref 40–60)
HGB BLD-MCNC: 14.9 G/DL (ref 13–17.7)
HGB UR QL STRIP.AUTO: NEGATIVE
HYALINE CASTS UR QL AUTO: NORMAL /LPF
IMM GRANULOCYTES # BLD AUTO: 0 10*3/MM3 (ref 0–0.05)
IMM GRANULOCYTES NFR BLD AUTO: 0 % (ref 0–0.5)
KETONES UR QL STRIP: NEGATIVE
LDLC SERPL CALC-MCNC: 73 MG/DL (ref 0–100)
LDLC/HDLC SERPL: 1.6 {RATIO}
LEUKOCYTE ESTERASE UR QL STRIP.AUTO: NEGATIVE
LYMPHOCYTES # BLD AUTO: 2.13 10*3/MM3 (ref 0.7–3.1)
LYMPHOCYTES NFR BLD AUTO: 46.5 % (ref 19.6–45.3)
MCH RBC QN AUTO: 28.4 PG (ref 26.6–33)
MCHC RBC AUTO-ENTMCNC: 32.1 G/DL (ref 31.5–35.7)
MCV RBC AUTO: 88.4 FL (ref 79–97)
MICROALBUMIN/CREAT UR: NORMAL MG/G{CREAT}
MONOCYTES # BLD AUTO: 0.41 10*3/MM3 (ref 0.1–0.9)
MONOCYTES NFR BLD AUTO: 9 % (ref 5–12)
NEUTROPHILS NFR BLD AUTO: 1.86 10*3/MM3 (ref 1.7–7)
NEUTROPHILS NFR BLD AUTO: 40.5 % (ref 42.7–76)
NITRITE UR QL STRIP: NEGATIVE
NRBC BLD AUTO-RTO: 0 /100 WBC (ref 0–0.2)
PH UR STRIP.AUTO: 5.5 [PH] (ref 5–8)
PLATELET # BLD AUTO: 235 10*3/MM3 (ref 140–450)
PMV BLD AUTO: 10.4 FL (ref 6–12)
POTASSIUM SERPL-SCNC: 4.4 MMOL/L (ref 3.5–5.2)
PROT SERPL-MCNC: 7.2 G/DL (ref 6–8.5)
PROT UR QL STRIP: NEGATIVE
PSA SERPL-MCNC: 1.76 NG/ML (ref 0–4)
RBC # BLD AUTO: 5.25 10*6/MM3 (ref 4.14–5.8)
RBC # UR STRIP: NORMAL /HPF
REF LAB TEST METHOD: NORMAL
SODIUM SERPL-SCNC: 139 MMOL/L (ref 136–145)
SP GR UR STRIP: 1.03 (ref 1–1.03)
SQUAMOUS #/AREA URNS HPF: NORMAL /HPF
TESTOST SERPL-MCNC: 486 NG/DL (ref 193–740)
TRIGL SERPL-MCNC: 72 MG/DL (ref 0–150)
TSH SERPL DL<=0.05 MIU/L-ACNC: 4.19 UIU/ML (ref 0.27–4.2)
UROBILINOGEN UR QL STRIP: ABNORMAL
VIT B12 BLD-MCNC: 1037 PG/ML (ref 211–946)
VLDLC SERPL-MCNC: 15 MG/DL (ref 5–40)
WBC # UR STRIP: NORMAL /HPF
WBC NRBC COR # BLD AUTO: 4.58 10*3/MM3 (ref 3.4–10.8)

## 2025-06-12 PROCEDURE — 82570 ASSAY OF URINE CREATININE: CPT

## 2025-06-12 PROCEDURE — G0103 PSA SCREENING: HCPCS

## 2025-06-12 PROCEDURE — 82043 UR ALBUMIN QUANTITATIVE: CPT

## 2025-06-12 PROCEDURE — 81001 URINALYSIS AUTO W/SCOPE: CPT

## 2025-06-12 PROCEDURE — 80050 GENERAL HEALTH PANEL: CPT

## 2025-06-12 PROCEDURE — 82550 ASSAY OF CK (CPK): CPT

## 2025-06-12 PROCEDURE — 82607 VITAMIN B-12: CPT

## 2025-06-12 PROCEDURE — 84403 ASSAY OF TOTAL TESTOSTERONE: CPT

## 2025-06-12 PROCEDURE — 80061 LIPID PANEL: CPT

## 2025-06-12 PROCEDURE — 83036 HEMOGLOBIN GLYCOSYLATED A1C: CPT

## 2025-06-12 PROCEDURE — 82306 VITAMIN D 25 HYDROXY: CPT

## 2025-06-18 NOTE — PROGRESS NOTES
"Chief Complaint   Patient presents with    Annual Exam    Hyperlipidemia       History of Present Illness  64 y.o.  male presents for updated phys examination and f/u on DM, BP, cholesterol, and vitamin levels.    Having upcoming nose repair surgery on 7/1/25 at  with Dr. Armenta. Reports inability to breathe through 1 side of his nostrils.    Ongoing PT for hands; still has stiffness and occ swelling. L index finger slightly deviated; has a finger sleeve that he wears. Is not consistent with his shoulder exercise after finishing PT.    Fasting BG's 80-1 20s, nonfasting 140s.  Taking Vascepa only 1 capsule twice daily.    Review of Systems  Denies headaches, visual changes, CP, palpitations, SOB, cough, abd pain, n/v/d, difficulty with urination, numbness/tingling, falls, mood changes, lightheadedness, hearing changes, rashes.  ROS (+) or hand stiffness and swelling that comes and goes. ROS (+) intermittent shoulder pain. ROS (+) for asymmetric nasal congestion.   All other ROS reviewed and negative.    Current Outpatient Medications:     Semaglutide, 1 MG/DOSE, (Ozempic) weekly    aspirin 81 MG QD    Cholecalciferol (VITAMIN D3) 5000 UNITS QD    empagliflozin (Jardiance) 25 MG QD    fenofibric acid 135 MG QD    icosapent ethyl (Vascepa) 1 g BID    metFORMIN  MG 4 QD    methocarbamol 500 MG prn    Multiple Vitamins-Minerals QD    pioglitazone 30 MG QD    rosuvastatin 20 MG QD    valsartan 160 MG QD    vitamin B-12 (CYANOCOBALAMIN) 500 MCG QD    VITALS:  /68   Pulse 76   Ht 172.7 cm (68\")   Wt 86.6 kg (191 lb)   SpO2 100%   BMI 29.04 kg/m²     Physical Exam  Vitals and nursing note reviewed.   Constitutional:       General: He is not in acute distress.     Appearance: Normal appearance. He is well-developed.   HENT:      Head: Normocephalic.      Right Ear: Ear canal and external ear normal.      Left Ear: Ear canal and external ear normal.      Nose: Nose normal.   Eyes:      Extraocular " Movements: Extraocular movements intact.      Conjunctiva/sclera: Conjunctivae normal.      Pupils: Pupils are equal, round, and reactive to light.   Neck:      Vascular: No carotid bruit (bilaterally).   Cardiovascular:      Rate and Rhythm: Normal rate and regular rhythm.      Heart sounds: Normal heart sounds.   Pulmonary:      Effort: Pulmonary effort is normal. No respiratory distress.      Breath sounds: Normal breath sounds.   Abdominal:      General: Bowel sounds are normal. There is no distension.      Palpations: Abdomen is soft. There is no mass.      Tenderness: There is no abdominal tenderness.   Musculoskeletal:      Cervical back: Normal range of motion and neck supple.   Lymphadenopathy:      Cervical: No cervical adenopathy.   Skin:     General: Skin is warm and dry.      Findings: No rash.   Neurological:      Mental Status: He is alert.   Psychiatric:         Mood and Affect: Mood normal.         Behavior: Behavior normal.         LABS  Results for orders placed or performed in visit on 06/12/25   Comprehensive Metabolic Panel    Collection Time: 06/12/25  8:57 AM    Specimen: Blood   Result Value Ref Range    Glucose 101 (H) 65 - 99 mg/dL    BUN 18.0 8.0 - 23.0 mg/dL    Creatinine 1.08 0.76 - 1.27 mg/dL    Sodium 139 136 - 145 mmol/L    Potassium 4.4 3.5 - 5.2 mmol/L    Chloride 104 98 - 107 mmol/L    CO2 21.0 (L) 22.0 - 29.0 mmol/L    Calcium 10.3 8.6 - 10.5 mg/dL    Total Protein 7.2 6.0 - 8.5 g/dL    Albumin 4.7 3.5 - 5.2 g/dL    ALT (SGPT) 17 1 - 41 U/L    AST (SGOT) 26 1 - 40 U/L    Alkaline Phosphatase 28 (L) 39 - 117 U/L    Total Bilirubin 0.4 0.0 - 1.2 mg/dL    Globulin 2.5 gm/dL    A/G Ratio 1.9 g/dL    BUN/Creatinine Ratio 16.7 7.0 - 25.0    Anion Gap 14.0 5.0 - 15.0 mmol/L    eGFR 76.6 >60.0 mL/min/1.73   Lipid Panel    Collection Time: 06/12/25  8:57 AM    Specimen: Blood   Result Value Ref Range    Total Cholesterol 134 0 - 200 mg/dL    Triglycerides 72 0 - 150 mg/dL    HDL  Cholesterol 46 40 - 60 mg/dL    LDL Cholesterol  73 0 - 100 mg/dL    VLDL Cholesterol 15 5 - 40 mg/dL    LDL/HDL Ratio 1.60    TSH    Collection Time: 06/12/25  8:57 AM    Specimen: Blood   Result Value Ref Range    TSH 4.190 0.270 - 4.200 uIU/mL   Microalbumin / Creatinine Urine Ratio - Urine, Clean Catch    Collection Time: 06/12/25  8:57 AM    Specimen: Urine, Clean Catch   Result Value Ref Range    Microalbumin/Creatinine Ratio      Creatinine, Urine 90.2 mg/dL    Microalbumin, Urine <1.2 mg/dL   Hemoglobin A1c    Collection Time: 06/12/25  8:57 AM    Specimen: Blood   Result Value Ref Range    Hemoglobin A1C 6.10 (H) 4.80 - 5.60 %   PSA Screen    Collection Time: 06/12/25  8:57 AM    Specimen: Blood   Result Value Ref Range    PSA 1.760 0.000 - 4.000 ng/mL   CK    Collection Time: 06/12/25  8:57 AM    Specimen: Blood   Result Value Ref Range    Creatine Kinase 223 (H) 20 - 200 U/L   Vitamin D,25-Hydroxy    Collection Time: 06/12/25  8:57 AM    Specimen: Blood   Result Value Ref Range    25 Hydroxy, Vitamin D 58.1 30.0 - 100.0 ng/ml   Vitamin B12    Collection Time: 06/12/25  8:57 AM    Specimen: Blood   Result Value Ref Range    Vitamin B-12 1,037 (H) 211 - 946 pg/mL   Testosterone    Collection Time: 06/12/25  8:57 AM    Specimen: Blood   Result Value Ref Range    Testosterone, Total 486.00 193.00 - 740.00 ng/dL   CBC Auto Differential    Collection Time: 06/12/25  8:57 AM    Specimen: Blood   Result Value Ref Range    WBC 4.58 3.40 - 10.80 10*3/mm3    RBC 5.25 4.14 - 5.80 10*6/mm3    Hemoglobin 14.9 13.0 - 17.7 g/dL    Hematocrit 46.4 37.5 - 51.0 %    MCV 88.4 79.0 - 97.0 fL    MCH 28.4 26.6 - 33.0 pg    MCHC 32.1 31.5 - 35.7 g/dL    RDW 13.9 12.3 - 15.4 %    RDW-SD 44.5 37.0 - 54.0 fl    MPV 10.4 6.0 - 12.0 fL    Platelets 235 140 - 450 10*3/mm3    Neutrophil % 40.5 (L) 42.7 - 76.0 %    Lymphocyte % 46.5 (H) 19.6 - 45.3 %    Monocyte % 9.0 5.0 - 12.0 %    Eosinophil % 3.1 0.3 - 6.2 %    Basophil % 0.9 0.0 -  1.5 %    Immature Grans % 0.0 0.0 - 0.5 %    Neutrophils, Absolute 1.86 1.70 - 7.00 10*3/mm3    Lymphocytes, Absolute 2.13 0.70 - 3.10 10*3/mm3    Monocytes, Absolute 0.41 0.10 - 0.90 10*3/mm3    Eosinophils, Absolute 0.14 0.00 - 0.40 10*3/mm3    Basophils, Absolute 0.04 0.00 - 0.20 10*3/mm3    Immature Grans, Absolute 0.00 0.00 - 0.05 10*3/mm3    nRBC 0.0 0.0 - 0.2 /100 WBC   Urinalysis without microscopic (no culture) - Urine, Clean Catch    Collection Time: 06/12/25  8:57 AM    Specimen: Urine, Clean Catch   Result Value Ref Range    Color, UA Yellow Yellow, Straw    Appearance, UA Clear Clear    pH, UA 5.5 5.0 - 8.0    Specific Gravity, UA 1.030 1.005 - 1.030    Glucose, UA >=1000 mg/dL (3+) (A) Negative    Ketones, UA Negative Negative    Bilirubin, UA Negative Negative    Blood, UA Negative Negative    Protein, UA Negative Negative    Leuk Esterase, UA Negative Negative    Nitrite, UA Negative Negative    Urobilinogen, UA 0.2 E.U./dL 0.2 - 1.0 E.U./dL   Urinalysis, Microscopic Only - Urine, Clean Catch    Collection Time: 06/12/25  8:57 AM    Specimen: Urine, Clean Catch   Result Value Ref Range    RBC, UA 0-2 None Seen, 0-2 /HPF    WBC, UA 0-2 None Seen, 0-2 /HPF    Bacteria, UA None Seen None Seen /HPF    Squamous Epithelial Cells, UA 0-2 None Seen, 0-2 /HPF    Hyaline Casts, UA None Seen None Seen /LPF    Methodology Automated Microscopy      3/13/25 A1C 6.2    6/12/24 LDL 51, PSA 1.13      ECG 12 Lead    Date/Time: 6/19/2025 8:56 AM  Performed by: Violette Hylton MD    Authorized by: Violette Hylton MD  Comparison: compared with previous ECG from 6/17/2024  Similar to previous ECG  Rhythm: sinus rhythm  Rate: normal  BPM: 72  Conduction: conduction normal  ST Segments: ST segments normal  T Waves: T waves normal  T inversion: III  QRS axis: normal  Clinical impression comment: stable EKG            ASSESSMENT/PLAN    Diagnoses and all orders for this visit:    1. PE (physical exam), routine  (Primary)  Assessment & Plan:  Health maintenance - refuses COVID19 vacc; flu vacc 10/24; RSV 1/24; PVX 9/16; Prev20 6/22; Td 2/20 (next Tdap due 2030), Shingrix and HAV done; PSA bord 1.76; CASSANDRA deferred with upcoming colonosc; colonosc due (last 9/20, repeat 2025 per Dr. Brandon); eye exam w/ Dr. Acosta 12/18/24 (needs Qyr); dental exam q6 mos; (+) seat belt use      2. Hypertension  Assessment & Plan:  BP stable 130/68; cont valsartan 160mg QD #90, 3RF    Orders:  -     valsartan (DIOVAN) 160 MG tablet; Take 1 tablet by mouth Daily.  Dispense: 90 tablet; Refill: 3  -     ECG 12 Lead    3. Type 2 diabetes mellitus with both eyes affected by mild nonproliferative retinopathy without macular edema, without long-term current use of insulin  Assessment & Plan:  BG control stable with A1C 6.1; cont Jardiance 25 mg QD #90, 3RF, metformin ER 500mg 4 QD #360, 3RF, mariaelena 30mg QD #90, 3RF, and Ozempic 1mg weekly #mo, 3RF; cont home BG monitoring; f/u A1C in 4-6 mos with lipids    Orders:  -     empagliflozin (Jardiance) 25 MG tablet tablet; Take 1 tablet by mouth Daily.  Dispense: 90 tablet; Refill: 3  -     metFORMIN ER (GLUCOPHAGE-XR) 500 MG 24 hr tablet; Take 4 tablets by mouth Daily With Breakfast.  Dispense: 360 tablet; Refill: 3  -     pioglitazone (ACTOS) 30 MG tablet; Take 1 tablet by mouth Daily.  Dispense: 90 tablet; Refill: 3  -     Semaglutide, 1 MG/DOSE, (Ozempic, 1 MG/DOSE,) 4 MG/3ML solution pen-injector; Inject 1 mg under the skin into the appropriate area as directed 1 (One) Time Per Week.  Dispense: 9 mL; Refill: 3  -     Hemoglobin A1c; Future    4. Hyperlipidemia  Assessment & Plan:  Lipids bord with LDL 73; goal LDL < 70; cont rosuvastatin 20mg QD #90, 3RF; pt also takes fenofibrate 135mg QD #90, 3RF and vascepa 1gm BID (instead of 2 BID)    Orders:  -     fenofibric acid (TRILIPIX) 135 MG capsule delayed-release delayed release capsule; Take 1 capsule by mouth Daily.  Dispense: 90 capsule; Refill: 3  -      "icosapent ethyl (Vascepa) 1 g capsule capsule; Take 2 g by mouth 2 (Two) Times a Day With Meals.  Dispense: 360 capsule; Refill: 3  -     rosuvastatin (CRESTOR) 20 MG tablet; Take 1 tablet by mouth Daily.  Dispense: 90 tablet; Refill: 3  -     Lipid Panel; Future    5. Hypertriglyceridemia  Assessment & Plan:  TGs  at goal 72 with goal TG < 150; cont fenofibrate 135mg QD #90, 3RF and vascepa 1gm BID (already escribed but note patient only takes 1gm BID); also takes rosuvastatin 20mg QD #90, 3RF    Orders:  -     fenofibric acid (TRILIPIX) 135 MG capsule delayed-release delayed release capsule; Take 1 capsule by mouth Daily.  Dispense: 90 capsule; Refill: 3  -     icosapent ethyl (Vascepa) 1 g capsule capsule; Take 2 g by mouth 2 (Two) Times a Day With Meals.  Dispense: 360 capsule; Refill: 3  -     Lipid Panel; Future    6. Vitamin D deficiency  Assessment & Plan:  [facility] vit D 58.1; cont current supplementation 1000u QD      7. Low vitamin B12 level  Assessment & Plan:  Supertherapeutic B12 1037 on 500mcg QD supplementation - could decrease dose or decr to QOD      8. Increased prostate specific antigen (PSA) velocity  Assessment & Plan:  Bord PSA 1.76; reviewed trend over the last several yrs; will be getting prostate check with upcoming colonoscopy; f/u PSA in 1 yr      9. Screening for colon cancer  -     Ambulatory Referral to Colorectal Surgery    10. Impingement syndrome of right shoulder  Assessment & Plan:  Rec maintenance PT exercises at home      11. Closed fracture of both wrists, sequela  Assessment & Plan:  Ongoing PT for hands and wrists; rec home maintenance exercise program; may have chronic stiffness and intermittent swelling but as long as function is intact, this may be the \"new normal\"          FOLLOW-UP  RTC 4-6 mos with A1C and lipids (escribed)  Will be going to [facility] to [facility] in 1 yr    Electronically signed by:    Violette Hylton MD, FACP  06/19/2025      "

## 2025-06-18 NOTE — ASSESSMENT & PLAN NOTE
Lipids bord with LDL 73; goal LDL < 70; cont rosuvastatin 20mg QD #90, 3RF; pt also takes fenofibrate 135mg QD #90, 3RF and vascepa 1gm BID (instead of 2 BID)

## 2025-06-18 NOTE — ASSESSMENT & PLAN NOTE
Bord PSA 1.76; reviewed trend over the last several yrs; will be getting prostate check with upcoming colonoscopy; f/u PSA in 1 yr

## 2025-06-18 NOTE — ASSESSMENT & PLAN NOTE
TGs  at goal 72 with goal TG < 150; cont fenofibrate 135mg QD #90, 3RF and vascepa 1gm BID (already escribed but note patient only takes 1gm BID); also takes rosuvastatin 20mg QD #90, 3RF

## 2025-06-18 NOTE — ASSESSMENT & PLAN NOTE
Health maintenance - refuses COVID19 vacc; flu vacc 10/24; RSV 1/24; PVX 9/16; Prev20 6/22; Td 2/20 (next Tdap due 2030), Shingrix and HAV done; PSA bord 1.76; CASSANDRA deferred with upcoming colonosc; colonosc due (last 9/20, repeat 2025 per Dr. Brandon); eye exam w/ Dr. Acosta 12/18/24 (needs Qyr); dental exam q6 mos; (+) seat belt use

## 2025-06-19 ENCOUNTER — OFFICE VISIT (OUTPATIENT)
Dept: INTERNAL MEDICINE | Facility: CLINIC | Age: 65
End: 2025-06-19
Payer: COMMERCIAL

## 2025-06-19 VITALS
DIASTOLIC BLOOD PRESSURE: 68 MMHG | WEIGHT: 191 LBS | HEIGHT: 68 IN | OXYGEN SATURATION: 100 % | SYSTOLIC BLOOD PRESSURE: 130 MMHG | HEART RATE: 76 BPM | BODY MASS INDEX: 28.95 KG/M2

## 2025-06-19 DIAGNOSIS — Z00.00 PE (PHYSICAL EXAM), ROUTINE: Primary | ICD-10-CM

## 2025-06-19 DIAGNOSIS — I10 ESSENTIAL HYPERTENSION: Chronic | ICD-10-CM

## 2025-06-19 DIAGNOSIS — R79.89 LOW VITAMIN B12 LEVEL: Chronic | ICD-10-CM

## 2025-06-19 DIAGNOSIS — E55.9 VITAMIN D DEFICIENCY: Chronic | ICD-10-CM

## 2025-06-19 DIAGNOSIS — S62.101S CLOSED FRACTURE OF BOTH WRISTS, SEQUELA: ICD-10-CM

## 2025-06-19 DIAGNOSIS — E11.3293 TYPE 2 DIABETES MELLITUS WITH BOTH EYES AFFECTED BY MILD NONPROLIFERATIVE RETINOPATHY WITHOUT MACULAR EDEMA, WITHOUT LONG-TERM CURRENT USE OF INSULIN: Chronic | ICD-10-CM

## 2025-06-19 DIAGNOSIS — E78.2 MIXED HYPERLIPIDEMIA: Chronic | ICD-10-CM

## 2025-06-19 DIAGNOSIS — S62.102S CLOSED FRACTURE OF BOTH WRISTS, SEQUELA: ICD-10-CM

## 2025-06-19 DIAGNOSIS — M75.41 IMPINGEMENT SYNDROME OF RIGHT SHOULDER: ICD-10-CM

## 2025-06-19 DIAGNOSIS — E78.1 HYPERTRIGLYCERIDEMIA: Chronic | ICD-10-CM

## 2025-06-19 DIAGNOSIS — R97.20 INCREASED PROSTATE SPECIFIC ANTIGEN (PSA) VELOCITY: Chronic | ICD-10-CM

## 2025-06-19 DIAGNOSIS — Z12.11 SCREENING FOR COLON CANCER: ICD-10-CM

## 2025-06-19 PROBLEM — S09.93XS FACIAL TRAUMA, SEQUELA: Status: RESOLVED | Noted: 2025-05-14 | Resolved: 2025-06-19

## 2025-06-19 PROBLEM — M25.532 WRIST PAIN, LEFT: Status: RESOLVED | Noted: 2025-02-19 | Resolved: 2025-06-19

## 2025-06-19 PROBLEM — Z87.09 HISTORY OF NASAL OBSTRUCTION: Status: RESOLVED | Noted: 2025-05-14 | Resolved: 2025-06-19

## 2025-06-19 PROBLEM — S52.501A CLOSED FRACTURE OF RIGHT DISTAL RADIUS: Status: RESOLVED | Noted: 2025-01-29 | Resolved: 2025-06-19

## 2025-06-19 RX ORDER — METFORMIN HYDROCHLORIDE 500 MG/1
2000 TABLET, EXTENDED RELEASE ORAL
Qty: 360 TABLET | Refills: 3 | Status: SHIPPED | OUTPATIENT
Start: 2025-06-19

## 2025-06-19 RX ORDER — FENOFIBRIC ACID 135 MG/1
135 CAPSULE, DELAYED RELEASE ORAL DAILY
Qty: 90 CAPSULE | Refills: 3 | Status: SHIPPED | OUTPATIENT
Start: 2025-06-19

## 2025-06-19 RX ORDER — SEMAGLUTIDE 1.34 MG/ML
1 INJECTION, SOLUTION SUBCUTANEOUS WEEKLY
Qty: 9 ML | Refills: 3 | Status: SHIPPED | OUTPATIENT
Start: 2025-06-19

## 2025-06-19 RX ORDER — VALSARTAN 160 MG/1
160 TABLET ORAL DAILY
Qty: 90 TABLET | Refills: 3 | Status: SHIPPED | OUTPATIENT
Start: 2025-06-19

## 2025-06-19 RX ORDER — ROSUVASTATIN CALCIUM 20 MG/1
20 TABLET, COATED ORAL DAILY
Qty: 90 TABLET | Refills: 3 | Status: SHIPPED | OUTPATIENT
Start: 2025-06-19

## 2025-06-19 RX ORDER — PIOGLITAZONE 30 MG/1
30 TABLET ORAL DAILY
Qty: 90 TABLET | Refills: 3 | Status: SHIPPED | OUTPATIENT
Start: 2025-06-19

## 2025-06-19 RX ORDER — ICOSAPENT ETHYL 1 G/1
2 CAPSULE ORAL 2 TIMES DAILY WITH MEALS
Qty: 360 CAPSULE | Refills: 3 | Status: SHIPPED | OUTPATIENT
Start: 2025-06-19

## 2025-06-19 NOTE — ASSESSMENT & PLAN NOTE
"Ongoing PT for hands and wrists; rec home maintenance exercise program; may have chronic stiffness and intermittent swelling but as long as function is intact, this may be the \"new normal\"  "

## 2025-06-19 NOTE — ASSESSMENT & PLAN NOTE
BG control stable with A1C 6.1; cont Jardiance 25 mg QD #90, 3RF, metformin ER 500mg 4 QD #360, 3RF, mariaelena 30mg QD #90, 3RF, and Ozempic 1mg weekly #mo, 3RF; cont home BG monitoring; f/u A1C in 4-6 mos with lipids

## 2025-06-23 ENCOUNTER — TREATMENT (OUTPATIENT)
Dept: PHYSICAL THERAPY | Facility: CLINIC | Age: 65
End: 2025-06-23
Payer: COMMERCIAL

## 2025-06-23 DIAGNOSIS — G89.29 CHRONIC PAIN OF RIGHT KNEE: Primary | ICD-10-CM

## 2025-06-23 DIAGNOSIS — M25.561 CHRONIC PAIN OF RIGHT KNEE: Primary | ICD-10-CM

## 2025-06-23 PROCEDURE — 97530 THERAPEUTIC ACTIVITIES: CPT | Performed by: PHYSICAL THERAPIST

## 2025-06-23 PROCEDURE — 97110 THERAPEUTIC EXERCISES: CPT | Performed by: PHYSICAL THERAPIST

## 2025-06-23 PROCEDURE — 97140 MANUAL THERAPY 1/> REGIONS: CPT | Performed by: PHYSICAL THERAPIST

## 2025-06-23 PROCEDURE — 97161 PT EVAL LOW COMPLEX 20 MIN: CPT | Performed by: PHYSICAL THERAPIST

## 2025-06-23 NOTE — PROGRESS NOTES
Physical Therapy Initial Evaluation and Plan of Care    [facility] [place]  [address]  [place] [id number] [phone]    Patient: Bradley Ortiz   : 1960  Diagnosis/ICD-10 Code:  No primary diagnosis found.  Referring practitioner: Violette Hylton MD    Subjective Evaluation    History of Present Illness    Subjective comment: Started having a right knee pain from kneeling quite a bit a few years.  Previous history of playing catcher in softball.  Symptoms feels deep in the knee and are sharp in nature.  Has had swelling in the past few weeks. (Fell end of January.  Ended up with bilateral wrist fractures and rigth faciial and nose fractures.  Also, with left 1st index finger dislocation.)  Patient Occupation: [employer]  Exacerbated by: Kneeling; standing up; flx hip to tie shoes.  Progression: improved (Past 6 weeks.)    Social Support  Lives with: spouse           *LEFS:  61/80  Objective          Active Range of Motion   Left Knee   Flexion: 140 degrees   Extension: 0 degrees     Right Knee   Flexion: 136 degrees   Extension: 0 degrees     Passive Range of Motion     Additional Passive Range of Motion Details  *(+) R knee hypomobility with overpressure in ext    Strength/Myotome Testing     Right Hip   Planes of Motion   Abduction: 5    Left Knee   Flexion: 5  Extension: 5    Right Knee   Flexion: 5  Extension: 5          Assessment & Plan       Assessment  Impairments: activity intolerance, lacks appropriate home exercise program, pain with function and weight-bearing intolerance   Functional limitations: unable to perform repetitive tasks (Stand up from sitting  Kneeling  Hip flx to don/doff shoe)  Assessment details: Mr. Ortiz is a very pleasant 64 year old male that presents to physical therapy with chronic right knee pain starting insidiously years ago.  Symptoms worsened 6 weeks ago, but have improved since then.  PMH was reviewed.   Ambulates without antalgia.  No signs of edema or erythema.  Has full knee flexion mobility.  Extension mobility is mildly limited when compared bilaterally.  Has full knee strength in extension and flexion. Unable to reproduce right knee pain with palpation about the right knee region.  Will focus care on restoring terminal knee extension.  Combined with a graded loading program to eliminate remaining sensitivity.  Prognosis: good    Goals  Plan Goals: STGs:  1.)  LEFS improved x 1 MCID in 6 weeks.  2.)  Have no right knee pain with kneeling in 6 weeks.  LTGs:  1.)  Have 0/10 knee pain with overpressure in extension and flexion in 8 weeks.      Plan  Therapy options: will be seen for skilled therapy services  Planned therapy interventions: therapeutic activities, stretching, strengthening, manual therapy, abdominal trunk stabilization, balance/weight-bearing training and functional ROM exercises  Frequency: 2x month  Duration in visits: 4  Duration in weeks: 8  Treatment plan discussed with: patient      Manual Therapy:    10     mins  90643;  Therapeutic Exercise:    12     mins  15500;     Neuromuscular Keely:        mins  75954;    Therapeutic Activity:     8     mins  54611;     Gait Training:           mins  53162;     Ultrasound:         mins  24626;    Electrical Stimulation:         mins  03083 ( );  Dry Needling         mins self-pay    Timed Treatment:   30   mins   Total Treatment:     54   mins    PT SIGNATURE: Steve Donnelly, PT   DATE TREATMENT INITIATED: 6/23/2025    Initial Certification  Certification Period: 9/21/2025  I certify that the therapy services are furnished while this patient is under my care.  The services outlined above are required by this patient, and will be reviewed every 90 days.     PHYSICIAN: Violette Hylton MD  NPI: [id number]                                      DATE:    Please sign and return via fax to [phone].. Thank you, [facility].

## 2025-07-14 ENCOUNTER — TREATMENT (OUTPATIENT)
Dept: PHYSICAL THERAPY | Facility: CLINIC | Age: 65
End: 2025-07-14
Payer: COMMERCIAL

## 2025-07-14 DIAGNOSIS — M25.561 CHRONIC PAIN OF RIGHT KNEE: Primary | ICD-10-CM

## 2025-07-14 DIAGNOSIS — G89.29 CHRONIC PAIN OF RIGHT KNEE: Primary | ICD-10-CM

## 2025-07-14 PROCEDURE — 97140 MANUAL THERAPY 1/> REGIONS: CPT | Performed by: PHYSICAL THERAPIST

## 2025-07-14 PROCEDURE — 97110 THERAPEUTIC EXERCISES: CPT | Performed by: PHYSICAL THERAPIST

## 2025-07-14 PROCEDURE — 97530 THERAPEUTIC ACTIVITIES: CPT | Performed by: PHYSICAL THERAPIST

## 2025-07-14 PROCEDURE — 97112 NEUROMUSCULAR REEDUCATION: CPT | Performed by: PHYSICAL THERAPIST

## 2025-07-14 NOTE — PROGRESS NOTES
Physical Therapy Daily Progress Note    Patient: Bradley Ortiz   : 1960  Diagnosis/ICD-10 Code:  Chronic pain of right knee [M25.561, G89.29]  Referring practitioner: Violette Hylton MD  Date of Initial Visit: Type: THERAPY  Noted: 2025  Today's Date: 2025  Patient seen for 2 sessions         Bradley Ortiz reports that his knee is doing well.  Has been taking a steroid dose pack for bilateral hand pain.  No pain with donning socks/shoes as it was painful last week.      Subjective     Objective   See Exercise, Manual, and Modality Logs for complete treatment.       Assessment/Plan  Has improved right TKE mobility.  Still some mild accessory stiffness at the tib-fem joint.  Continued emphasis on restoring proximal hip and thigh strength.  Combined with improving general balance and loading tolerance.  Will plan to f/u in 2-3 weeks depending on how he feels after having septoplasty on 25.             Manual Therapy:    12     mins  84413;  Therapeutic Exercise:    23     mins  79820;     Neuromuscular Keely:    8    mins  05177;    Therapeutic Activity:     8     mins  98710;     Gait Training:          mins  05945;     Ultrasound:          mins  27421;    Electrical Stimulation:        mins  45974 ( );  Dry Needling          mins self-pay    Timed Treatment:   51   mins   Total Treatment:     51   mins    Steve Donnelly PT  Physical Therapist

## 2025-08-14 ENCOUNTER — OFFICE VISIT (OUTPATIENT)
Dept: ORTHOPEDIC SURGERY | Facility: CLINIC | Age: 65
End: 2025-08-14
Payer: COMMERCIAL

## 2025-08-14 VITALS — SYSTOLIC BLOOD PRESSURE: 136 MMHG | DIASTOLIC BLOOD PRESSURE: 82 MMHG | BODY MASS INDEX: 29.05 KG/M2 | HEIGHT: 68 IN

## 2025-08-14 DIAGNOSIS — E11.618 DIABETIC FROZEN SHOULDER ASSOCIATED WITH TYPE 2 DIABETES MELLITUS: ICD-10-CM

## 2025-08-14 DIAGNOSIS — M25.511 RIGHT SHOULDER PAIN, UNSPECIFIED CHRONICITY: Primary | ICD-10-CM

## 2025-08-14 DIAGNOSIS — M75.00 DIABETIC FROZEN SHOULDER ASSOCIATED WITH TYPE 2 DIABETES MELLITUS: ICD-10-CM

## 2025-08-14 RX ORDER — OXYCODONE HYDROCHLORIDE 5 MG/1
TABLET ORAL
COMMUNITY
Start: 2025-07-22

## 2025-08-14 RX ORDER — METHYLPREDNISOLONE 4 MG/1
TABLET ORAL
COMMUNITY
Start: 2025-07-09

## 2025-08-14 RX ORDER — LIDOCAINE HYDROCHLORIDE 10 MG/ML
5 INJECTION, SOLUTION EPIDURAL; INFILTRATION; INTRACAUDAL; PERINEURAL
Status: COMPLETED | OUTPATIENT
Start: 2025-08-14 | End: 2025-08-14

## 2025-08-14 RX ORDER — DEXAMETHASONE SODIUM PHOSPHATE 4 MG/ML
4 INJECTION, SOLUTION INTRA-ARTICULAR; INTRALESIONAL; INTRAMUSCULAR; INTRAVENOUS; SOFT TISSUE
Status: COMPLETED | OUTPATIENT
Start: 2025-08-14 | End: 2025-08-14

## 2025-08-14 RX ADMIN — DEXAMETHASONE SODIUM PHOSPHATE 4 MG: 4 INJECTION, SOLUTION INTRA-ARTICULAR; INTRALESIONAL; INTRAMUSCULAR; INTRAVENOUS; SOFT TISSUE at 14:46

## 2025-08-14 RX ADMIN — LIDOCAINE HYDROCHLORIDE 5 ML: 10 INJECTION, SOLUTION EPIDURAL; INFILTRATION; INTRACAUDAL; PERINEURAL at 14:46
